# Patient Record
Sex: FEMALE | Race: WHITE | NOT HISPANIC OR LATINO | Employment: UNEMPLOYED | URBAN - METROPOLITAN AREA
[De-identification: names, ages, dates, MRNs, and addresses within clinical notes are randomized per-mention and may not be internally consistent; named-entity substitution may affect disease eponyms.]

---

## 2017-01-02 RX ORDER — ATORVASTATIN CALCIUM 20 MG/1
TABLET, FILM COATED ORAL
Qty: 90 TABLET | Refills: 0 | Status: SHIPPED | OUTPATIENT
Start: 2017-01-02 | End: 2017-01-23 | Stop reason: SDUPTHER

## 2017-01-10 ENCOUNTER — TELEPHONE (OUTPATIENT)
Dept: FAMILY MEDICINE | Facility: CLINIC | Age: 72
End: 2017-01-10

## 2017-01-10 NOTE — TELEPHONE ENCOUNTER
----- Message from Tana Guerra sent at 1/5/2017 12:27 PM CST -----  Contact: Self [phone]  Pt called to get results to her stool sample that she had done , pt states she dropped the sample off on Dec 12 th, 2016 , please contact pt at [phone]

## 2017-01-10 NOTE — TELEPHONE ENCOUNTER
----- Message from Matilde Cleveland sent at 1/10/2017  3:49 PM CST -----  Contact: self  Pt is requesting a call back in regards to her test results from the stool sampler Please call pt at your earliest convenience.  Thanks !

## 2017-01-10 NOTE — TELEPHONE ENCOUNTER
----- Message from Alexia Pereyra sent at 1/6/2017  2:16 PM CST -----  Contact: [phone]  Pt is requesting a call back in regards to her test results from the stool sampler Please call pt at your earliest convenience.  Thanks !

## 2017-01-16 DIAGNOSIS — I10 HYPERTENSION, BENIGN: ICD-10-CM

## 2017-01-16 RX ORDER — ATENOLOL 25 MG/1
TABLET ORAL
Qty: 90 TABLET | Refills: 0 | Status: SHIPPED | OUTPATIENT
Start: 2017-01-16 | End: 2019-01-23

## 2017-01-19 DIAGNOSIS — Z12.11 SCREENING FOR MALIGNANT NEOPLASM OF COLON: Primary | ICD-10-CM

## 2017-01-23 ENCOUNTER — OFFICE VISIT (OUTPATIENT)
Dept: FAMILY MEDICINE | Facility: CLINIC | Age: 72
End: 2017-01-23
Payer: MEDICARE

## 2017-01-23 VITALS
WEIGHT: 187.19 LBS | DIASTOLIC BLOOD PRESSURE: 80 MMHG | SYSTOLIC BLOOD PRESSURE: 120 MMHG | TEMPERATURE: 98 F | HEART RATE: 52 BPM | BODY MASS INDEX: 36.75 KG/M2 | OXYGEN SATURATION: 97 % | HEIGHT: 60 IN

## 2017-01-23 DIAGNOSIS — I25.110 CORONARY ARTERY DISEASE INVOLVING NATIVE CORONARY ARTERY OF NATIVE HEART WITH UNSTABLE ANGINA PECTORIS: ICD-10-CM

## 2017-01-23 DIAGNOSIS — I70.0 AORTIC CALCIFICATION: ICD-10-CM

## 2017-01-23 DIAGNOSIS — F33.0 MAJOR DEPRESSIVE DISORDER, RECURRENT EPISODE, MILD: Primary | ICD-10-CM

## 2017-01-23 DIAGNOSIS — Z23 NEED FOR PNEUMOCOCCAL VACCINATION: ICD-10-CM

## 2017-01-23 DIAGNOSIS — Z23 NEED FOR TDAP VACCINATION: ICD-10-CM

## 2017-01-23 DIAGNOSIS — I73.9 PVD (PERIPHERAL VASCULAR DISEASE): ICD-10-CM

## 2017-01-23 DIAGNOSIS — I10 ESSENTIAL HYPERTENSION: Chronic | ICD-10-CM

## 2017-01-23 DIAGNOSIS — E78.5 HYPERLIPIDEMIA, UNSPECIFIED HYPERLIPIDEMIA TYPE: Chronic | ICD-10-CM

## 2017-01-23 PROCEDURE — 1160F RVW MEDS BY RX/DR IN RCRD: CPT | Mod: S$GLB,,, | Performed by: FAMILY MEDICINE

## 2017-01-23 PROCEDURE — 99499 UNLISTED E&M SERVICE: CPT | Mod: S$PBB,,, | Performed by: FAMILY MEDICINE

## 2017-01-23 PROCEDURE — G0009 ADMIN PNEUMOCOCCAL VACCINE: HCPCS | Mod: 59,S$GLB,, | Performed by: FAMILY MEDICINE

## 2017-01-23 PROCEDURE — 99214 OFFICE O/P EST MOD 30 MIN: CPT | Mod: 25,S$GLB,, | Performed by: FAMILY MEDICINE

## 2017-01-23 PROCEDURE — 99999 PR PBB SHADOW E&M-EST. PATIENT-LVL III: CPT | Mod: PBBFAC,,, | Performed by: FAMILY MEDICINE

## 2017-01-23 PROCEDURE — 90471 IMMUNIZATION ADMIN: CPT | Mod: S$GLB,,, | Performed by: FAMILY MEDICINE

## 2017-01-23 PROCEDURE — 90715 TDAP VACCINE 7 YRS/> IM: CPT | Mod: S$GLB,,, | Performed by: FAMILY MEDICINE

## 2017-01-23 PROCEDURE — 3079F DIAST BP 80-89 MM HG: CPT | Mod: S$GLB,,, | Performed by: FAMILY MEDICINE

## 2017-01-23 PROCEDURE — 3074F SYST BP LT 130 MM HG: CPT | Mod: S$GLB,,, | Performed by: FAMILY MEDICINE

## 2017-01-23 PROCEDURE — 90732 PPSV23 VACC 2 YRS+ SUBQ/IM: CPT | Mod: S$GLB,,, | Performed by: FAMILY MEDICINE

## 2017-01-23 PROCEDURE — 1157F ADVNC CARE PLAN IN RCRD: CPT | Mod: S$GLB,,, | Performed by: FAMILY MEDICINE

## 2017-01-23 PROCEDURE — 1159F MED LIST DOCD IN RCRD: CPT | Mod: S$GLB,,, | Performed by: FAMILY MEDICINE

## 2017-01-23 RX ORDER — CLONAZEPAM 1 MG/1
1 TABLET ORAL 2 TIMES DAILY
Qty: 60 TABLET | Refills: 0 | Status: SHIPPED | OUTPATIENT
Start: 2017-02-01

## 2017-01-23 RX ORDER — CLONAZEPAM 1 MG/1
1 TABLET ORAL 2 TIMES DAILY
Qty: 60 TABLET | Refills: 0 | Status: SHIPPED | OUTPATIENT
Start: 2017-03-01 | End: 2018-08-22

## 2017-01-23 RX ORDER — CLONAZEPAM 1 MG/1
1 TABLET ORAL 2 TIMES DAILY
Qty: 60 TABLET | Refills: 0 | Status: SHIPPED | OUTPATIENT
Start: 2017-04-01 | End: 2018-08-22

## 2017-01-23 NOTE — MR AVS SNAPSHOT
[facility]  [address]  [place] [address]  Phone: [phone]  Fax: [phone]                  Elizabeth Ayala   2017 1:45 PM   Office Visit    Description:  Female : 1945   Provider:  Eladia Fragoso MD   Department:  [facility] Medicine           Reason for Visit     Establish Care     Medication Refill     Other Misc           Diagnoses this Visit        Comments    Need for pneumococcal vaccination    -  Primary     Depression, unspecified depression type         Need for Tdap vaccination                To Do List           Goals (5 Years of Data)     None       These Medications        Disp Refills Start End    clonazePAM (KLONOPIN) 1 MG tablet 60 tablet 0 2017     Take 1 tablet (1 mg total) by mouth 2 (two) times daily. - Oral    Pharmacy: [facility]/pharmacy #[id number] [facility] [place] [facility] [address] Ph #: [phone]       clonazePAM (KLONOPIN) 1 MG tablet 60 tablet 0 3/1/2017     Take 1 tablet (1 mg total) by mouth 2 (two) times daily. - Oral    [id number] [place] [place] [facility] [facility] [address] Ph #: [phone]       clonazePAM (KLONOPIN) 1 MG tablet 60 tablet 0 2017     Take 1 tablet (1 mg total) by mouth 2 (two) times daily. - Oral    [id number] [place] - [address] Ph #: [phone]         [facility]     [facility] -  Assistance  Registered nurses in the [facility] provide clinical advisement, health education, appointment booking, and other advisory services.  Call for this free service at [phone].             Medications           Message regarding Medications     Verify the changes and/or additions to your medication regime listed below are the same as discussed with your clinician today.  If any of these changes or additions are incorrect, please notify your healthcare provider.        START taking these NEW medications        Refills     clonazePAM (KLONOPIN) 1 MG tablet 0    Starting on: 3/1/2017    Sig: Take 1 tablet (1 mg total) by mouth 2 (two) times daily.    Class: Print    Route: Oral    clonazePAM (KLONOPIN) 1 MG tablet 0    Starting on: 4/1/2017    Sig: Take 1 tablet (1 mg total) by mouth 2 (two) times daily.    Class: Print    Route: Oral      STOP taking these medications     b complex vitamins tablet Take 1 tablet by mouth once daily.    meclizine (ANTIVERT) 12.5 mg tablet Take 12.5 mg by mouth once daily.     ropinirole (REQUIP) 0.5 MG tablet TAKE 1 TABLET BY MOUTH 2 HOURS PRIOR TO BEDTIME FOR 7 DAYS THEN MAY INCREASE TO 2 TABLETS 2 HOURS PRIOR TO SLEEP           Verify that the below list of medications is an accurate representation of the medications you are currently taking.  If none reported, the list may be blank. If incorrect, please contact your healthcare provider. Carry this list with you in case of emergency.           Current Medications     aspirin (ECOTRIN) 81 MG EC tablet Take 81 mg by mouth once daily.      atenolol (TENORMIN) 25 MG tablet TAKE 1 TABLET BY MOUTH ONCE DAILY    atorvastatin (LIPITOR) 20 MG tablet TAKE 1 TABLET (20 MG TOTAL) BY MOUTH ONCE DAILY.    clonazePAM (KLONOPIN) 1 MG tablet Starting on Feb 01, 2017. Take 1 tablet (1 mg total) by mouth 2 (two) times daily.    clonazePAM (KLONOPIN) 1 MG tablet Starting on Mar 01, 2017. Take 1 tablet (1 mg total) by mouth 2 (two) times daily.    clonazePAM (KLONOPIN) 1 MG tablet Starting on Apr 01, 2017. Take 1 tablet (1 mg total) by mouth 2 (two) times daily.    escitalopram oxalate (LEXAPRO) 20 MG tablet Take 1 tablet (20 mg total) by mouth once daily.    hydrochlorothiazide (HYDRODIURIL) 25 MG tablet TAKE 1 TABLET BY MOUTH EVERY DAY    isosorbide mononitrate (IMDUR) 30 MG 24 hr tablet Take 30 mg by mouth once daily.    OM-3/DHA/EPA/FISH OIL/VIT D3 (FISH OIL-VIT D3 ORAL) Take by mouth.             Clinical Reference Information           Vital Signs - Last Recorded   Most recent update: 1/23/2017  1:39 PM by Faviola Davidson MA    BP Pulse Temp Ht Wt SpO2    120/80 (BP Location: Right arm, Patient Position: Sitting, BP Method: Manual) (!) 52 98.4 °F (36.9 °C) (Oral) 5' (1.524 m) 84.9 kg (187 lb 2.7 oz) 97%    BMI                36.55 kg/m2          Blood Pressure          Most Recent Value    BP  120/80      Allergies as of 1/23/2017     Effexor [Venlafaxine]    Codeine      Immunizations Administered on Date of Encounter - 1/23/2017     Name Date Dose VIS Date Route    Pneumococcal Polysaccharide - 23 Valent  Incomplete 0.5 mL 4/24/2015 Intramuscular    TDAP  Incomplete 0.5 mL 2/24/2015 Intramuscular      Orders Placed During Today's Visit      Normal Orders This Visit    Pneumococcal Polysaccharide Vaccine (23 Valent) (SQ/IM)     Tdap Vaccine (Adult)       [facility] Sign-Up     Activating your [facility] account is as easy as 1-2-3!     1) Visit [email or web address], select Sign Up Now, enter this activation code and your date of birth, then select Next.  [email or web address]  Expires: 3/9/2017  1:57 PM      2) Create a username and password to use when you visit [facility] in the future and select a security question in case you lose your password and select Next.    3) Enter your e-mail address and click Sign Up!    Additional Information  If you have questions, please e-mail [email or web address] or call [phone] to talk to our [facility] staff. Remember, [facility] is NOT to be used for urgent needs. For medical emergencies, dial 911.

## 2017-01-23 NOTE — PROGRESS NOTES
Routine Office Visit    Patient Name: Elizabeth Ayala    : 1945  MRN: [id number]    Subjective:  Elizabeth is a 71 y.o. female who presents today for     1. Anxiety / depression - chronic condition for patient - pt states she feels her symptoms are worse now. She had recent death (son) in her family. She is on lexapro for depression, and takes clonazepam for her depression and anxiety symptoms. She is doing well with current regimen and denies any side effects.   2. Pt does not want colonoscopy. She prefers stool cards to evaluate for blood in urine.     Review of Systems   Constitutional: Negative for chills and fever.   HENT: Negative for congestion.    Eyes: Negative for blurred vision.   Respiratory: Negative for cough.    Cardiovascular: Negative for chest pain.   Gastrointestinal: Negative for abdominal pain, constipation, diarrhea, heartburn, nausea and vomiting.   Genitourinary: Negative for dysuria.   Musculoskeletal: Negative for myalgias.   Skin: Negative for itching and rash.   Neurological: Negative for dizziness and headaches.   Psychiatric/Behavioral: Negative for depression.       Active Problem List  Patient Active Problem List   Diagnosis    Depression    Essential hypertension, benign    CAD (coronary artery disease)    Obesity    Hypertension    Hyperlipidemia       Past Surgical History  Past Surgical History   Procedure Laterality Date    Hysterectomy      Adenoidectomy      Tonsillectomy      Breast surgery       biospy    Total knee arthroplasty         Family History  History reviewed. No pertinent family history.    Social History  Social History     Social History    Marital status:      Spouse name: N/A    Number of children: N/A    Years of education: N/A     Occupational History    Not on file.     Social History Main Topics    Smoking status: Never Smoker    Smokeless tobacco: Never Used    Alcohol use No    Drug use: No    Sexual activity: Yes     Partners:  Male     Other Topics Concern    Not on file     Social History Narrative       Medications and Allergies  Reviewed and updated.       Physical Exam  Visit Vitals    /80 (BP Location: Right arm, Patient Position: Sitting, BP Method: Manual)    Pulse (!) 52    Temp 98.4 °F (36.9 °C) (Oral)    Ht 5' (1.524 m)    Wt 84.9 kg (187 lb 2.7 oz)    SpO2 97%    BMI 36.55 kg/m2     Physical Exam   Constitutional: She is oriented to person, place, and time. She appears well-developed and well-nourished.   HENT:   Head: Normocephalic and atraumatic.   Eyes: Conjunctivae and EOM are normal. Pupils are equal, round, and reactive to light.   Neck: Normal range of motion. Neck supple.   Cardiovascular: Normal rate, regular rhythm and normal heart sounds.  Exam reveals no gallop and no friction rub.    No murmur heard.  Pulmonary/Chest: Breath sounds normal. No respiratory distress.   Abdominal: Soft. Bowel sounds are normal. She exhibits no distension. There is no tenderness.   Musculoskeletal: Normal range of motion.   Lymphadenopathy:     She has no cervical adenopathy.   Neurological: She is alert and oriented to person, place, and time.   Skin: Skin is warm.   Psychiatric: She has a normal mood and affect.         Assessment/Plan:  Elizabeth Ayala is a 71 y.o. female who presents today for :    Major depressive disorder, recurrent episode, mild  -     clonazePAM (KLONOPIN) 1 MG tablet; Take 1 tablet (1 mg total) by mouth 2 (two) times daily.  Dispense: 60 tablet; Refill: 0  -     clonazePAM (KLONOPIN) 1 MG tablet; Take 1 tablet (1 mg total) by mouth 2 (two) times daily.  Dispense: 60 tablet; Refill: 0  -     clonazePAM (KLONOPIN) 1 MG tablet; Take 1 tablet (1 mg total) by mouth 2 (two) times daily.  Dispense: 60 tablet; Refill: 0  -     Ambulatory referral to Psychiatry  Reviewed chart / [place]   Advise pt would need to return every 6 months for refills (patient initially was returning once a year)  Pt was on lexapro  - did not continue - did not work for her; has tried celexa     Need for pneumococcal vaccination  -     Pneumococcal Polysaccharide Vaccine (23 Valent) (SQ/IM)    Need for Tdap vaccination  -     Tdap Vaccine (Adult)    Coronary artery disease involving native coronary artery of native heart with unstable angina pectoris / PVD (peripheral vascular disease)  The current medical regimen is effective;  continue present plan and medications.    Aortic calcification / Hyperlipidemia, unspecified hyperlipidemia type / Essential hypertension  Comments:  cxr 4/29/2015  Patient with calcification of the aorta.  Stable/asymptomatic. Currently stable on lipid lowering treatment and b/p monitoring.  Continue with current medication regimen     Stool cards ordered      Return in about 6 months (around 7/23/2017), or if symptoms worsen or fail to improve.

## 2017-01-30 ENCOUNTER — LAB VISIT (OUTPATIENT)
Dept: LAB | Facility: HOSPITAL | Age: 72
End: 2017-01-30
Attending: FAMILY MEDICINE
Payer: MEDICARE

## 2017-01-30 DIAGNOSIS — Z12.11 SCREENING FOR MALIGNANT NEOPLASM OF COLON: ICD-10-CM

## 2017-01-30 LAB
OB PNL STL: NEGATIVE

## 2017-01-30 PROCEDURE — 82272 OCCULT BLD FECES 1-3 TESTS: CPT | Mod: 91

## 2017-01-31 ENCOUNTER — TELEPHONE (OUTPATIENT)
Dept: FAMILY MEDICINE | Facility: CLINIC | Age: 72
End: 2017-01-31

## 2017-03-06 RX ORDER — ATORVASTATIN CALCIUM 20 MG/1
TABLET, FILM COATED ORAL
Qty: 90 TABLET | Refills: 0 | Status: SHIPPED | OUTPATIENT
Start: 2017-03-06 | End: 2018-08-22

## 2017-04-19 RX ORDER — HYDROCHLOROTHIAZIDE 25 MG/1
25 TABLET ORAL DAILY
Qty: 90 TABLET | Refills: 0 | Status: ON HOLD | OUTPATIENT
Start: 2017-04-19 | End: 2024-03-28 | Stop reason: HOSPADM

## 2017-04-19 NOTE — TELEPHONE ENCOUNTER
----- Message from Minerva Sparks LPN sent at 4/19/2017 10:34 AM CDT -----  Contact: [facility]      ----- Message -----     From: Kourtney Bernstein     Sent: 4/19/2017  10:05 AM       To: Guille GUZMAN Staff    REFILL: hydrochlorothiazide (HYDRODIURIL) 25 MG tablet    PLEASE SEND TO [facility]

## 2017-06-07 ENCOUNTER — TELEPHONE (OUTPATIENT)
Dept: FAMILY MEDICINE | Facility: CLINIC | Age: 72
End: 2017-06-07

## 2017-06-07 DIAGNOSIS — F41.9 ANXIETY: ICD-10-CM

## 2017-06-07 RX ORDER — MOMETASONE FUROATE 1 MG/G
CREAM TOPICAL
Refills: 1 | COMMUNITY
Start: 2017-04-03 | End: 2018-08-22

## 2017-06-07 NOTE — TELEPHONE ENCOUNTER
Patient states that refills of this medication must go through Dr. Amna Levy's office @ the [facility], [phone].  Pharmacist @ [facility] notified and states they will contact her.

## 2017-06-07 NOTE — TELEPHONE ENCOUNTER
----- Message from Balieeerin Avitia sent at 6/7/2017 12:24 PM CDT -----  Contact: [facility]   Refill request on escitalopram oxalate (LEXAPRO) 20 MG tablet. Thanks!

## 2018-08-17 DIAGNOSIS — Z12.39 BREAST CANCER SCREENING: ICD-10-CM

## 2018-08-22 PROBLEM — N64.52 NIPPLE DISCHARGE: Status: ACTIVE | Noted: 2018-08-22

## 2018-08-22 NOTE — H&P (VIEW-ONLY)
Subjective:       Patient ID: Elizabeth Ayala is a 72 y.o. female.    Chief Complaint: Breast Discharge    HPI 71 yo female with bloody nipple discharge and recent biopsy that was negative  Review of Systems   Constitutional: Negative.    HENT: Negative.    Eyes: Negative.    Respiratory: Negative.    Cardiovascular: Negative.    Gastrointestinal: Negative.    Endocrine: Negative.    Musculoskeletal: Negative.    Skin: Negative.    Allergic/Immunologic: Negative.    Neurological: Negative.    Hematological: Negative.    Psychiatric/Behavioral: Negative.    All other systems reviewed and are negative.      Objective:      Physical Exam   Constitutional: She is oriented to person, place, and time. She appears well-developed and well-nourished.   HENT:   Head: Normocephalic and atraumatic.   Right Ear: External ear normal.   Left Ear: External ear normal.   Nose: Nose normal.   Mouth/Throat: Oropharynx is clear and moist.   Eyes: Conjunctivae and EOM are normal. Pupils are equal, round, and reactive to light.   Neck: Normal range of motion. Neck supple.   Cardiovascular: Normal rate, regular rhythm, normal heart sounds and intact distal pulses.   Pulmonary/Chest: Effort normal and breath sounds normal.   Abdominal: Soft. Bowel sounds are normal.   Musculoskeletal: Normal range of motion.   Neurological: She is alert and oriented to person, place, and time. She has normal reflexes.   Skin: Skin is warm and dry.   Psychiatric: She has a normal mood and affect. Her behavior is normal. Thought content normal.   Vitals reviewed.      Assessment:     bloody nipple discharge      Plan:       To the OR for major duct excision with the risks and possible complications and she agrees to proceed

## 2018-09-07 ENCOUNTER — HOSPITAL ENCOUNTER (OUTPATIENT)
Dept: PREADMISSION TESTING | Facility: HOSPITAL | Age: 73
Discharge: HOME | End: 2018-09-07
Attending: SURGERY
Payer: MEDICARE

## 2018-09-07 VITALS
BODY MASS INDEX: 35.23 KG/M2 | TEMPERATURE: 99 F | WEIGHT: 179.44 LBS | DIASTOLIC BLOOD PRESSURE: 62 MMHG | OXYGEN SATURATION: 96 % | SYSTOLIC BLOOD PRESSURE: 128 MMHG | RESPIRATION RATE: 17 BRPM | HEIGHT: 60 IN | HEART RATE: 58 BPM

## 2018-09-07 DIAGNOSIS — N64.52 NIPPLE DISCHARGE: ICD-10-CM

## 2018-09-07 DIAGNOSIS — Z01.818 PRE-OP TESTING: Primary | ICD-10-CM

## 2018-09-07 LAB
ALBUMIN SERPL BCP-MCNC: 3.9 G/DL
ALP SERPL-CCNC: 69 U/L
ALT SERPL W/O P-5'-P-CCNC: 28 U/L
ANION GAP SERPL CALC-SCNC: 9 MMOL/L
AST SERPL-CCNC: 23 U/L
BASOPHILS # BLD AUTO: 0.02 K/UL
BASOPHILS NFR BLD: 0.3 %
BILIRUB SERPL-MCNC: 0.5 MG/DL
BUN SERPL-MCNC: 16 MG/DL
CALCIUM SERPL-MCNC: 10 MG/DL
CHLORIDE SERPL-SCNC: 102 MMOL/L
CO2 SERPL-SCNC: 26 MMOL/L
CREAT SERPL-MCNC: 0.7 MG/DL
DIFFERENTIAL METHOD: NORMAL
EOSINOPHIL # BLD AUTO: 0.3 K/UL
EOSINOPHIL NFR BLD: 4.8 %
ERYTHROCYTE [DISTWIDTH] IN BLOOD BY AUTOMATED COUNT: 13.5 %
EST. GFR  (AFRICAN AMERICAN): >60 ML/MIN/1.73 M^2
EST. GFR  (NON AFRICAN AMERICAN): >60 ML/MIN/1.73 M^2
GLUCOSE SERPL-MCNC: 92 MG/DL
HCT VFR BLD AUTO: 38.1 %
HGB BLD-MCNC: 13 G/DL
LYMPHOCYTES # BLD AUTO: 2 K/UL
LYMPHOCYTES NFR BLD: 28.6 %
MCH RBC QN AUTO: 28.6 PG
MCHC RBC AUTO-ENTMCNC: 34.1 G/DL
MCV RBC AUTO: 84 FL
MONOCYTES # BLD AUTO: 0.7 K/UL
MONOCYTES NFR BLD: 9.7 %
NEUTROPHILS # BLD AUTO: 4.1 K/UL
NEUTROPHILS NFR BLD: 56.6 %
PLATELET # BLD AUTO: 312 K/UL
PMV BLD AUTO: 9.2 FL
POTASSIUM SERPL-SCNC: 3.7 MMOL/L
PROT SERPL-MCNC: 7.4 G/DL
RBC # BLD AUTO: 4.54 M/UL
SODIUM SERPL-SCNC: 137 MMOL/L
WBC # BLD AUTO: 7.14 K/UL

## 2018-09-07 PROCEDURE — 93010 ELECTROCARDIOGRAM REPORT: CPT | Mod: ,,, | Performed by: INTERNAL MEDICINE

## 2018-09-07 PROCEDURE — 85025 COMPLETE CBC W/AUTO DIFF WBC: CPT

## 2018-09-07 PROCEDURE — 93005 ELECTROCARDIOGRAM TRACING: CPT

## 2018-09-07 PROCEDURE — 80053 COMPREHEN METABOLIC PANEL: CPT

## 2018-09-07 RX ORDER — MULTIVITAMIN
1 TABLET ORAL DAILY
COMMUNITY

## 2018-09-07 RX ORDER — NICOTINE POLACRILEX 2 MG
1 GUM BUCCAL DAILY
COMMUNITY

## 2018-09-07 NOTE — DISCHARGE INSTRUCTIONS
Your surgery is scheduled for _Friday Sept 14, 2018___________________.    Call [phone] between 2 p.m. and 5 p.m. on   Thursday___ to find out your arrival time for the day of your surgery.      Please report to SAME DAY SURGERY UNIT on the 2nd FLOOR at _______ a.m.  Use front door entrance. The doors open at 0530 am.          INSTRUCTIONS IMPORTANT!!!  ¨ Do not eat or drink after 12 midnight-including water. OK to brush teeth, no   gum, candy or mints!    ¨ Take only these medicines with a small swallow of water-morning of surgery.  Take Amlodipine and Atenolol with swallow of water morning of surgery        __x__  Prep instructions: ENEMA   SHOWER   OTHER  ______________________  __x__  Please shower using Hibiclens soap the night before AND  the morning of your surgery/procedure. Do not use Hibiclens on your face or genitals               If your surgery is around your belly button (Navel) be sure to wash inside your  belly button also. Rinse hibiclens off completely.  __x__  No shaving of procedural area at least 4-5 days before surgery due to  increased risk of skin irritation and/or possible infection.  __x__  Do not wear makeup, including mascara. WEARING EYE MAKEUP MAY  LEAD TO SERIOUS EYE INJURY during surgery.  _x___  No powder, lotions or creams to your body.  _x___  You may wear only deodorant on the day of surgery.  __x__  Please remove all jewelry, including piercings and leave at home.  __x__  No money or valuables needed. Please leave at home.  You may bring your           cell phone.  ____  Please bring any documents given by your doctor.  __x__  If going home the same day, arrange for a ride home. You will not be able to drive if Anesthesia was used.  ____  Children under 18 years require a parent / guardian present the entire time   they are in surgery / recovery.  __x__  Wear loose fitting clothing. Allow for dressings, bandages.  __x__  Stop Aspirin, Ibuprofen, Motrin and Aleve at least 7  days before  surgery, unless otherwise instructed by your doctor, or the nurse.              You MAY use Tylenol/acetaminophen until day of surgery.    _x___  Call MD for temperature above 101 degrees.        _x___ Stop taking any Fish Oil supplement or any Vitamins that contain Vitamin  E at least 7 days prior to surgery.            I have read or had read and explained to me, and understand the above information.  Additional comments or instructions:Please call   [phone] if you have any questions regarding the instructions above.

## 2018-09-14 ENCOUNTER — ANESTHESIA (OUTPATIENT)
Dept: SURGERY | Facility: HOSPITAL | Age: 73
End: 2018-09-14
Payer: MEDICARE

## 2018-09-14 ENCOUNTER — HOSPITAL ENCOUNTER (OUTPATIENT)
Facility: HOSPITAL | Age: 73
Discharge: HOME | End: 2018-09-14
Attending: SURGERY | Admitting: SURGERY
Payer: MEDICARE

## 2018-09-14 ENCOUNTER — ANESTHESIA EVENT (OUTPATIENT)
Dept: SURGERY | Facility: HOSPITAL | Age: 73
End: 2018-09-14
Payer: MEDICARE

## 2018-09-14 VITALS
SYSTOLIC BLOOD PRESSURE: 149 MMHG | WEIGHT: 179.44 LBS | TEMPERATURE: 97 F | RESPIRATION RATE: 16 BRPM | BODY MASS INDEX: 35.23 KG/M2 | HEIGHT: 60 IN | DIASTOLIC BLOOD PRESSURE: 63 MMHG | OXYGEN SATURATION: 97 % | HEART RATE: 48 BPM

## 2018-09-14 DIAGNOSIS — N64.52 NIPPLE DISCHARGE: Primary | ICD-10-CM

## 2018-09-14 LAB — POCT GLUCOSE: 112 MG/DL (ref 70–110)

## 2018-09-14 PROCEDURE — 63600175 PHARM REV CODE 636 W HCPCS: Performed by: SURGERY

## 2018-09-14 PROCEDURE — 82962 GLUCOSE BLOOD TEST: CPT | Performed by: SURGERY

## 2018-09-14 PROCEDURE — 27201423 OPTIME MED/SURG SUP & DEVICES STERILE SUPPLY: Performed by: SURGERY

## 2018-09-14 PROCEDURE — 71000015 HC POSTOP RECOV 1ST HR: Performed by: SURGERY

## 2018-09-14 PROCEDURE — 63600175 PHARM REV CODE 636 W HCPCS: Performed by: ANESTHESIOLOGY

## 2018-09-14 PROCEDURE — D9220A PRA ANESTHESIA: Mod: CRNA,,, | Performed by: NURSE ANESTHETIST, CERTIFIED REGISTERED

## 2018-09-14 PROCEDURE — 25000003 PHARM REV CODE 250: Performed by: ANESTHESIOLOGY

## 2018-09-14 PROCEDURE — 27200651 HC AIRWAY, LMA: Performed by: NURSE ANESTHETIST, CERTIFIED REGISTERED

## 2018-09-14 PROCEDURE — 37000008 HC ANESTHESIA 1ST 15 MINUTES: Performed by: SURGERY

## 2018-09-14 PROCEDURE — 71000016 HC POSTOP RECOV ADDL HR: Performed by: SURGERY

## 2018-09-14 PROCEDURE — 37000009 HC ANESTHESIA EA ADD 15 MINS: Performed by: SURGERY

## 2018-09-14 PROCEDURE — 71000039 HC RECOVERY, EACH ADD'L HOUR: Performed by: SURGERY

## 2018-09-14 PROCEDURE — 88307 TISSUE EXAM BY PATHOLOGIST: CPT | Performed by: PATHOLOGY

## 2018-09-14 PROCEDURE — 88307 TISSUE EXAM BY PATHOLOGIST: CPT | Mod: 26,,, | Performed by: PATHOLOGY

## 2018-09-14 PROCEDURE — C9290 INJ, BUPIVACAINE LIPOSOME: HCPCS | Performed by: SURGERY

## 2018-09-14 PROCEDURE — 25000003 PHARM REV CODE 250: Performed by: SURGERY

## 2018-09-14 PROCEDURE — 36000707: Performed by: SURGERY

## 2018-09-14 PROCEDURE — 25000003 PHARM REV CODE 250: Performed by: NURSE ANESTHETIST, CERTIFIED REGISTERED

## 2018-09-14 PROCEDURE — D9220A PRA ANESTHESIA: Mod: ANES,,, | Performed by: ANESTHESIOLOGY

## 2018-09-14 PROCEDURE — 63600175 PHARM REV CODE 636 W HCPCS: Performed by: NURSE ANESTHETIST, CERTIFIED REGISTERED

## 2018-09-14 PROCEDURE — 36000706: Performed by: SURGERY

## 2018-09-14 PROCEDURE — S0020 INJECTION, BUPIVICAINE HYDRO: HCPCS | Performed by: SURGERY

## 2018-09-14 PROCEDURE — 71000033 HC RECOVERY, INTIAL HOUR: Performed by: SURGERY

## 2018-09-14 RX ORDER — METOCLOPRAMIDE HYDROCHLORIDE 5 MG/ML
INJECTION INTRAMUSCULAR; INTRAVENOUS
Status: DISCONTINUED | OUTPATIENT
Start: 2018-09-14 | End: 2018-09-14

## 2018-09-14 RX ORDER — CEFAZOLIN SODIUM 2 G/50ML
2 SOLUTION INTRAVENOUS
Status: COMPLETED | OUTPATIENT
Start: 2018-09-14 | End: 2018-09-14

## 2018-09-14 RX ORDER — LIDOCAINE HCL/PF 100 MG/5ML
SYRINGE (ML) INTRAVENOUS
Status: DISCONTINUED | OUTPATIENT
Start: 2018-09-14 | End: 2018-09-14

## 2018-09-14 RX ORDER — SODIUM CHLORIDE 0.9 % (FLUSH) 0.9 %
3 SYRINGE (ML) INJECTION
Status: DISCONTINUED | OUTPATIENT
Start: 2018-09-14 | End: 2018-09-17 | Stop reason: HOSPADM

## 2018-09-14 RX ORDER — SODIUM CHLORIDE, SODIUM LACTATE, POTASSIUM CHLORIDE, CALCIUM CHLORIDE 600; 310; 30; 20 MG/100ML; MG/100ML; MG/100ML; MG/100ML
INJECTION, SOLUTION INTRAVENOUS CONTINUOUS
Status: DISCONTINUED | OUTPATIENT
Start: 2018-09-14 | End: 2018-09-17 | Stop reason: HOSPADM

## 2018-09-14 RX ORDER — MIDAZOLAM HYDROCHLORIDE 1 MG/ML
INJECTION, SOLUTION INTRAMUSCULAR; INTRAVENOUS
Status: DISCONTINUED | OUTPATIENT
Start: 2018-09-14 | End: 2018-09-14

## 2018-09-14 RX ORDER — SODIUM CHLORIDE 9 MG/ML
INJECTION, SOLUTION INTRAVENOUS CONTINUOUS
Status: DISCONTINUED | OUTPATIENT
Start: 2018-09-14 | End: 2018-09-17 | Stop reason: HOSPADM

## 2018-09-14 RX ORDER — PROPOFOL 10 MG/ML
VIAL (ML) INTRAVENOUS
Status: DISCONTINUED | OUTPATIENT
Start: 2018-09-14 | End: 2018-09-14

## 2018-09-14 RX ORDER — PROPOFOL 10 MG/ML
VIAL (ML) INTRAVENOUS CONTINUOUS PRN
Status: DISCONTINUED | OUTPATIENT
Start: 2018-09-14 | End: 2018-09-14

## 2018-09-14 RX ORDER — FENTANYL CITRATE 50 UG/ML
INJECTION, SOLUTION INTRAMUSCULAR; INTRAVENOUS
Status: DISCONTINUED | OUTPATIENT
Start: 2018-09-14 | End: 2018-09-14

## 2018-09-14 RX ORDER — GLYCOPYRROLATE 0.2 MG/ML
INJECTION INTRAMUSCULAR; INTRAVENOUS
Status: DISCONTINUED | OUTPATIENT
Start: 2018-09-14 | End: 2018-09-14

## 2018-09-14 RX ORDER — FENTANYL CITRATE 50 UG/ML
25 INJECTION, SOLUTION INTRAMUSCULAR; INTRAVENOUS EVERY 5 MIN PRN
Status: DISCONTINUED | OUTPATIENT
Start: 2018-09-14 | End: 2018-09-17 | Stop reason: HOSPADM

## 2018-09-14 RX ORDER — SODIUM CHLORIDE 9 MG/ML
INJECTION, SOLUTION INTRAVENOUS CONTINUOUS
Status: CANCELLED | OUTPATIENT
Start: 2018-09-14

## 2018-09-14 RX ORDER — BUPIVACAINE HYDROCHLORIDE 2.5 MG/ML
INJECTION, SOLUTION INFILTRATION; PERINEURAL
Status: DISCONTINUED | OUTPATIENT
Start: 2018-09-14 | End: 2018-09-14 | Stop reason: HOSPADM

## 2018-09-14 RX ORDER — OXYCODONE AND ACETAMINOPHEN 5; 325 MG/1; MG/1
1 TABLET ORAL EVERY 4 HOURS PRN
Qty: 30 TABLET | Refills: 0 | Status: SHIPPED | OUTPATIENT
Start: 2018-09-14 | End: 2018-09-26

## 2018-09-14 RX ORDER — MORPHINE SULFATE 10 MG/ML
3 INJECTION INTRAMUSCULAR; INTRAVENOUS; SUBCUTANEOUS
Status: CANCELLED | OUTPATIENT
Start: 2018-09-14

## 2018-09-14 RX ORDER — ONDANSETRON 2 MG/ML
INJECTION INTRAMUSCULAR; INTRAVENOUS
Status: DISCONTINUED | OUTPATIENT
Start: 2018-09-14 | End: 2018-09-14

## 2018-09-14 RX ORDER — ACETAMINOPHEN 10 MG/ML
1000 INJECTION, SOLUTION INTRAVENOUS ONCE
Status: COMPLETED | OUTPATIENT
Start: 2018-09-14 | End: 2018-09-14

## 2018-09-14 RX ADMIN — SODIUM CHLORIDE, SODIUM LACTATE, POTASSIUM CHLORIDE, AND CALCIUM CHLORIDE: .6; .31; .03; .02 INJECTION, SOLUTION INTRAVENOUS at 07:09

## 2018-09-14 RX ADMIN — PROPOFOL 140 MCG/KG/MIN: 10 INJECTION, EMULSION INTRAVENOUS at 09:09

## 2018-09-14 RX ADMIN — FENTANYL CITRATE 50 MCG: 50 INJECTION INTRAMUSCULAR; INTRAVENOUS at 09:09

## 2018-09-14 RX ADMIN — METOCLOPRAMIDE 10 MG: 5 INJECTION, SOLUTION INTRAMUSCULAR; INTRAVENOUS at 08:09

## 2018-09-14 RX ADMIN — FENTANYL CITRATE 25 MCG: 50 INJECTION INTRAMUSCULAR; INTRAVENOUS at 09:09

## 2018-09-14 RX ADMIN — ACETAMINOPHEN 1000 MG: 10 INJECTION, SOLUTION INTRAVENOUS at 10:09

## 2018-09-14 RX ADMIN — MIDAZOLAM HYDROCHLORIDE 2 MG: 1 INJECTION, SOLUTION INTRAMUSCULAR; INTRAVENOUS at 08:09

## 2018-09-14 RX ADMIN — CEFAZOLIN SODIUM 2 G: 2 SOLUTION INTRAVENOUS at 08:09

## 2018-09-14 RX ADMIN — FENTANYL CITRATE 25 MCG: 50 INJECTION INTRAMUSCULAR; INTRAVENOUS at 10:09

## 2018-09-14 RX ADMIN — PROPOFOL 150 MG: 10 INJECTION, EMULSION INTRAVENOUS at 08:09

## 2018-09-14 RX ADMIN — ONDANSETRON 4 MG: 2 INJECTION, SOLUTION INTRAMUSCULAR; INTRAVENOUS at 09:09

## 2018-09-14 RX ADMIN — PROPOFOL 50 MG: 10 INJECTION, EMULSION INTRAVENOUS at 09:09

## 2018-09-14 RX ADMIN — FENTANYL CITRATE 25 MCG: 50 INJECTION INTRAMUSCULAR; INTRAVENOUS at 08:09

## 2018-09-14 RX ADMIN — LIDOCAINE HYDROCHLORIDE 80 MG: 20 INJECTION, SOLUTION INTRAVENOUS at 08:09

## 2018-09-14 RX ADMIN — GLYCOPYRROLATE 0.2 MG: 0.2 INJECTION, SOLUTION INTRAMUSCULAR; INTRAVENOUS at 08:09

## 2018-09-14 NOTE — OP NOTE
DATE OF PROCEDURE:  09/14/2018.    PREOPERATIVE DIAGNOSIS:  Bloody nipple discharge, right breast.    POSTOPERATIVE DIAGNOSIS:  Bloody nipple discharge, right breast.    PROCEDURE PERFORMED:  Major duct excision, right breast.    SURGEON:  Dylan Knowles M.D.    ANESTHESIA:  General.    DESCRIPTION OF OPERATION:  The patient was taken to the Operating Room, placed   on the operating table in the supine position.  Under adequate general   anesthesia, prepped and draped around her right breast and chest in usual   sterile fashion.  The duct of concern was then cannulated with a lacrimal duct   probe.  Incision was made inferiorly circumareolar and this was used to find the duct,   which was identified and ligated.  Distal dissection was done.  I could feel   some grittiness, not sure whether this was secondary to the inflammatory process   or neoplastic process; however, I was able to get around this entire process,   removed it and sent to Pathology with margin control.  Hemostasis was obtained   with the PlasmaBlade.  Following this, the patient's incision was then closed in   layers with absorbable suture.  Steri-Strips were applied as well as a bandage.    The patient was awakened and transported to Recovery Room in satisfactory   condition.      BOYD  dd: 09/14/2018 09:43:12 (CDT)  td: 09/14/2018 13:10:56 (CDT)  Doc ID   #[id number]  Job ID #[id number]    CC:

## 2018-09-14 NOTE — ANESTHESIA PREPROCEDURE EVALUATION
09/14/2018  Elizabeth Ayala is a 72 y.o., female.    Pre-op Assessment    I have reviewed the Patient Summary Reports.     I have reviewed the Medications.     Review of Systems  Anesthesia Hx:  No previous Anesthesia    Social:  Non-Smoker    Hematology/Oncology:  Hematology Normal   Oncology Normal     EENT/Dental:EENT/Dental Normal   Cardiovascular:   Hypertension CAD      Pulmonary:  Pulmonary Normal    Renal/:  Renal/ Normal     Hepatic/GI:  Hepatic/GI Normal    Musculoskeletal:   Arthritis     Neurological:  Neurology Normal    Endocrine:  Endocrine Normal    Dermatological:  Skin Normal    Psych:   Psychiatric History          Physical Exam  General:  Well nourished    Airway/Jaw/Neck:  Airway Findings: Mallampati: II TM Distance: 4 - 6 cm      Dental:  DENTAL FINDINGS: Normal   Chest/Lungs:  Chest/Lungs Clear    Heart/Vascular:  Heart Findings: Normal       Mental Status:  Mental Status Findings:  Cooperative, Alert and Oriented         Anesthesia Plan  Type of Anesthesia, risks & benefits discussed:  Anesthesia Type:  general  Patient's Preference:   Intra-op Monitoring Plan: standard ASA monitors  Intra-op Monitoring Plan Comments:   Post Op Pain Control Plan: multimodal analgesia, IV/PO Opioids PRN and per primary service following discharge from PACU  Post Op Pain Control Plan Comments:   Induction:    Beta Blocker:  Patient is on a Beta-Blocker and has received one dose within the past 24 hours (No further documentation required).       Informed Consent: Patient understands risks and agrees with Anesthesia plan.  Questions answered. Anesthesia consent signed with patient.  ASA Score: 3     Day of Surgery Review of History & Physical:    H&P update referred to the provider.  H&P completed by Anesthesiologist.   Anesthesia Plan Notes: npo

## 2018-09-14 NOTE — DISCHARGE INSTRUCTIONS
[facility]   Office # [phone]     Discharge Instructions for Same Day Surgery     Call the office for and appointment if one has not already been made.     Diet: Drink plenty of fluids the first 48 hours and you may resume your   usual diet.     Activity: No heavy lifting (over 10 pounds), pushing or pulling until your   post op visit. Your doctor's office may have told you to limit your lifting to less weight, or even no weight.  Be sure to follow those instructions.    Note: You may ride in a car and you may drive when comfortable.     Do not drive, drink alcohol, or sign legal documents for 24 hours, or if taking narcotic pain medication.    Dressings: Remove the dressing 24 hours after surgery. You may shower  24 hours after surgery and you may wash your hair.     If you have steri strips ( appears to be strips of white tape) on   your incision, leave them on.        Medical: Call the doctor for any of the following problems: fever above 101,   severe pain, bleeding, or abdominal distention (swelling).   If constipated you may take any stool softener you choose.     Occasionally small areas of skin numbness or an unpleasant skin sensation can result. Also, you may find that your incision is swollen and tender for a few days.  Some redness around sutures and staples is a normal reaction, but if the discomfort persists or worsens, call you doctor.                                                Exparel information      To help control your pain after surgery, your surgeon injected Exparel (bupicacaine liposome injectable suspension) into your surgical incision just before the end of the procedure.      Exparel is a local analgesic that contains the local anesthetic bupivacaine..  Local anesthetics provide pain relief by numbing the tissue around the surgical site.    Exparel is specifically designed to release pain medication over time an can control pain for up to 72 hours.    In addition to  Exparel, your surgeon may provide other pain medications to control your pain.    Each patient is different and responds differently to pain medication.  Depending on how you respond to Exparel, you may require less additional pain medication during your recovery.    Side effects can occur with any medication and it is important not to ignore anything you may be experiencing.  Some patients who received Exparel experienced nausea, vomiting, or constipation.  Rarely, patients who receive bupivacaine (the active ingredient in Exparel) have experienced numbness and tingling in their mouth or lips, lightheadedness, or anxiety.  Speak with your doctor right away if you think you may be experiencing any of these sensations, or if you have other questions regarding possible side effects.    Products that contain bupivacaine, like Exparel, may cause a temporary loss of sensation or the ability to move in the area where bupivacaine was injected.    Other formulations of bupivacaine should not be administered within 96 hours following administrations of Exparel.  Do not remove the teal colored bracelet that you have on for 96 hours.  This bracelet will let other health care workers know that you have received Exparel, and not to give you bupivacaine during this 96 hours.    Fall Prevention  Millions of people fall every year and injure themselves. You may have had anesthesia or sedation which may increase your risk of falling. You may have health issues that put you at an increased risk of falling.     Here are ways to reduce your risk of falling.  ·   · Make your home safe by keeping walkways clear of objects you may trip over.  · Use non-slip pads under rugs. Do not use area rugs or small throw rugs.  · Use non-slip mats in bathtubs and showers.  · Install handrails and lights on staircases.  · Do not walk in poorly lit areas.  · Do not stand on chairs or wobbly ladders.  · Use caution when reaching overhead or looking  upward. This position can cause a loss of balance.  · Be sure your shoes fit properly, have non-slip bottoms and are in good condition.   · Wear shoes both inside and out. Avoid going barefoot or wearing slippers.  · Be cautious when going up and down stairs, curbs, and when walking on uneven sidewalks.  · If your balance is poor, consider using a cane or walker.  · If your fall was related to alcohol use, stop or limit alcohol intake.   · If your fall was related to use of sleeping medicines, talk to your doctor about this. You may need to reduce your dosage at bedtime if you awaken during the night to go to the bathroom.    · To reduce the need for nighttime bathroom trips:  ¨ Avoid drinking fluids for several hours before going to bed  ¨ Empty your bladder before going to bed  ¨ Men can keep a urinal at the bedside  · Stay as active as you can. Balance, flexibility, strength, and endurance all come from exercise. They all play a role in preventing falls. Ask your healthcare provider which types of activity are right for you.  · Get your vision checked on a regular basis.  · If you have pets, know where they are before you stand up or walk so you don't trip over them.  · Use night lights.

## 2018-09-14 NOTE — ANESTHESIA POSTPROCEDURE EVALUATION
Anesthesia Post Evaluation    Patient: Elizabeth Ayala    Procedure(s) Performed: Procedure(s) (LRB):  RIGHT BREAST MAJOR DUCT EXCISION (Right)    Final Anesthesia Type: general  Patient location during evaluation: PACU  Patient participation: Yes- Able to Participate  Level of consciousness: awake and alert, oriented and awake  Post-procedure vital signs: reviewed and stable  Pain management: adequate  Airway patency: patent  PONV status at discharge: No PONV  Anesthetic complications: no      Cardiovascular status: blood pressure returned to baseline, hemodynamically stable and stable  Respiratory status: unassisted and spontaneous ventilation  Hydration status: euvolemic  Follow-up not needed.        Visit Vitals  BP (!) 149/63 (BP Location: Right arm, Patient Position: Lying)   Pulse (!) 48   Temp 36.3 °C (97.4 °F) (Oral)   Resp 16   Ht 5' (1.524 m)   Wt 81.4 kg (179 lb 7 oz)   SpO2 97%   Breastfeeding? No   BMI 35.04 kg/m²       Pain/Ortiz Score: Pain Assessment Performed: Yes (9/14/2018  9:45 AM)  Presence of Pain: denies (9/14/2018 10:48 AM)  Pain Rating Prior to Med Admin: 5 (9/14/2018 10:30 AM)  Pain Rating Post Med Admin: 0 (9/14/2018 10:48 AM)  Ortiz Score: 10 (9/14/2018 10:48 AM)

## 2018-09-14 NOTE — INTERVAL H&P NOTE
The patient has been examined and the H&P has been reviewed:    I concur with the findings and no changes have occurred since H&P was written.    Anesthesia/Surgery risks, benefits and alternative options discussed and understood by patient/family.          Active Hospital Problems    Diagnosis  POA    Nipple discharge [N64.52]  Yes      Resolved Hospital Problems   No resolved problems to display.

## 2018-09-14 NOTE — TRANSFER OF CARE
Anesthesia Transfer of Care Note    Patient: Elizabeth Ayala    Procedure(s) Performed: Procedure(s) (LRB):  RIGHT BREAST MAJOR DUCT EXCISION (Right)    Patient location: PACU    Anesthesia Type: general    Transport from OR: Transported from OR on room air with adequate spontaneous ventilation    Post pain: adequate analgesia    Post assessment: no apparent anesthetic complications and tolerated procedure well    Post vital signs: stable    Level of consciousness: sedated and responds to stimulation    Nausea/Vomiting: no nausea/vomiting    Complications: none    Transfer of care protocol was followed      Last vitals:   Visit Vitals  BP (!) 159/67 (BP Location: Right arm)   Pulse 65   Temp 36.4 °C (97.5 °F) (Oral)   Resp 17   Ht 5' (1.524 m)   Wt 81.4 kg (179 lb 7 oz)   SpO2 98%   Breastfeeding? No   BMI 35.04 kg/m²

## 2018-09-14 NOTE — BRIEF OP NOTE
[facility]  Brief Operative Note     SUMMARY     Surgery Date: 9/14/2018     Surgeon(s) and Role:     * Dylan Knowles MD - Primary    Assisting Surgeon: None    Pre-op Diagnosis:  Nipple discharge [N64.52]    Post-op Diagnosis:  Post-Op Diagnosis Codes:     * Nipple discharge [N64.52]    Procedure(s) (LRB):  RIGHT BREAST MAJOR DUCT EXCISION (Right)    Anesthesia: General    Description of the findings of the procedure: fatty mass    Findings/Key Components: same    Estimated Blood Loss: minimal         Specimens:   Specimen (12h ago, onward)    Start     Ordered    09/14/18 0930  Specimen to Pathology - Surgery  Once     Comments:  Major duct excision-Right Breast; short stitch=superior, long stitch= lateral     Start Status   09/14/18 0930 Collected (09/14/18 0930)       09/14/18 0930          Discharge Note    SUMMARY     Admit Date: 9/14/2018    Discharge Date and Time:  09/14/2018 9:40 AM    Hospital Course (synopsis of major diagnoses, care, treatment, and services provided during the course of the hospital stay): uneventful post op course     Final Diagnosis: Post-Op Diagnosis Codes:     * Nipple discharge [N64.52]    Disposition: Home or Self Care    Follow Up/Patient Instructions:     Medications:  Reconciled Home Medications:      Medication List      START taking these medications    oxyCODONE-acetaminophen 5-325 mg per tablet  Commonly known as:  PERCOCET  Take 1 tablet by mouth every 4 (four) hours as needed for Pain.        CONTINUE taking these medications    amLODIPine 2.5 MG tablet  Commonly known as:  NORVASC     aspirin 81 MG EC tablet  Commonly known as:  ECOTRIN  Take 81 mg by mouth once daily.     atenolol 25 MG tablet  Commonly known as:  TENORMIN  TAKE 1 TABLET BY MOUTH ONCE DAILY     atorvastatin 80 MG tablet  Commonly known as:  LIPITOR     biotin 1 mg Cap  Take 1 capsule by mouth once daily.     clonazePAM 1 MG tablet  Commonly known as:  KLONOPIN  Take 1 tablet (1 mg  total) by mouth 2 (two) times daily.     FISH OIL-VIT D3 ORAL  Take by mouth.     hydroCHLOROthiazide 25 MG tablet  Commonly known as:  HYDRODIURIL  Take 1 tablet (25 mg total) by mouth once daily.     ibuprofen 100 MG tablet  Commonly known as:  ADVIL,MOTRIN  Take 600 mg by mouth daily as needed for Temperature greater than.     losartan 25 MG tablet  Commonly known as:  COZAAR  25 mg once daily.     ONE DAILY MULTIVITAMIN per tablet  Generic drug:  multivitamin  Take 1 tablet by mouth once daily.     sertraline 50 MG tablet  Commonly known as:  ZOLOFT          Discharge Procedure Orders   Diet general     Call MD for:  temperature >100.4     Call MD for:  persistent nausea and vomiting     Call MD for:  severe uncontrolled pain     Call MD for:  difficulty breathing, headache or visual disturbances     Call MD for:  redness, tenderness, or signs of infection (pain, swelling, redness, odor or green/yellow discharge around incision site)     Call MD for:  hives     Remove dressing in 24 hours     Shower on day dressing removed (No bath)

## 2018-09-26 ENCOUNTER — OFFICE VISIT (OUTPATIENT)
Dept: SURGERY | Facility: CLINIC | Age: 73
End: 2018-09-26
Payer: MEDICARE

## 2018-09-26 DIAGNOSIS — D24.1 INTRADUCTAL PAPILLOMA OF BREAST, RIGHT: Primary | ICD-10-CM

## 2018-09-26 PROCEDURE — 99024 POSTOP FOLLOW-UP VISIT: CPT | Mod: S$GLB,,, | Performed by: SURGERY

## 2018-09-26 NOTE — PROGRESS NOTES
Subjective:       Patient ID: Elizabeth Ayala is a 72 y.o. female.    Chief Complaint: Post-op Evaluation    HPI 71 yo female s/p major duct excision for intraductal papilloma without complaints  Review of Systems   Constitutional: Negative.    HENT: Negative.    Eyes: Negative.    Respiratory: Negative.    Cardiovascular: Negative.    Gastrointestinal: Negative.    Endocrine: Negative.    Musculoskeletal: Negative.    Skin: Negative.    Allergic/Immunologic: Negative.    Neurological: Negative.    Hematological: Negative.    Psychiatric/Behavioral: Negative.    All other systems reviewed and are negative.      Objective:      Physical Exam   Constitutional: She is oriented to person, place, and time. She appears well-developed and well-nourished.   HENT:   Head: Normocephalic and atraumatic.   Right Ear: External ear normal.   Left Ear: External ear normal.   Nose: Nose normal.   Mouth/Throat: Oropharynx is clear and moist.   Eyes: Conjunctivae and EOM are normal. Pupils are equal, round, and reactive to light.   Neck: Normal range of motion. Neck supple.   Cardiovascular: Normal rate, regular rhythm, normal heart sounds and intact distal pulses.   Pulmonary/Chest: Effort normal and breath sounds normal.   Abdominal: Soft. Bowel sounds are normal.   Musculoskeletal: Normal range of motion.   Neurological: She is alert and oriented to person, place, and time. She has normal reflexes.   Skin: Skin is warm and dry.   Psychiatric: She has a normal mood and affect. Her behavior is normal. Thought content normal.   Vitals reviewed.      Assessment:       1. Intraductal papilloma of breast, right        Plan:       I will see her back in 6 months with mammogram of her right breast

## 2019-01-14 ENCOUNTER — TELEPHONE (OUTPATIENT)
Dept: SURGERY | Facility: CLINIC | Age: 74
End: 2019-01-14

## 2019-01-14 DIAGNOSIS — D24.1 INTRADUCTAL PAPILLOMA OF BREAST, RIGHT: Primary | ICD-10-CM

## 2019-01-14 NOTE — TELEPHONE ENCOUNTER
Pt notified mammo order faxed to [facility] and she will call to setup appt      ----- Message from Emilia Silveira sent at 1/14/2019  2:07 PM CST -----  Contact: [phone]  Pt is requesting her mammo appt. And a f/u appt with you. PLs call pt [phone]. Thanks......Lisa

## 2019-01-16 ENCOUNTER — TELEPHONE (OUTPATIENT)
Dept: SURGERY | Facility: CLINIC | Age: 74
End: 2019-01-16

## 2019-01-16 NOTE — TELEPHONE ENCOUNTER
Pt notified of appt with  1/23/19 @ 1:00pm        ----- Message from Vera Macias sent at 1/16/2019  3:33 PM CST -----  Contact: self - [phone]  Pt states she is taking her mammogram next week on 01/22. Pt is asking for a sooner appt than first available on 02/06. She states her breast are still sore from the surgery and would like to f/u sooner.

## 2019-01-23 ENCOUNTER — OFFICE VISIT (OUTPATIENT)
Dept: SURGERY | Facility: CLINIC | Age: 74
End: 2019-01-23
Payer: MEDICARE

## 2019-01-23 VITALS
SYSTOLIC BLOOD PRESSURE: 129 MMHG | HEART RATE: 67 BPM | HEIGHT: 60 IN | WEIGHT: 189 LBS | DIASTOLIC BLOOD PRESSURE: 74 MMHG | BODY MASS INDEX: 37.11 KG/M2

## 2019-01-23 DIAGNOSIS — D24.1 INTRADUCTAL PAPILLOMA OF BREAST, RIGHT: Primary | ICD-10-CM

## 2019-01-23 PROCEDURE — 99024 POSTOP FOLLOW-UP VISIT: CPT | Mod: S$GLB,,, | Performed by: SURGERY

## 2019-01-23 PROCEDURE — 99024 PR POST-OP FOLLOW-UP VISIT: ICD-10-PCS | Mod: S$GLB,,, | Performed by: SURGERY

## 2019-01-23 RX ORDER — ATENOLOL 50 MG/1
50 TABLET ORAL DAILY
Refills: 3 | Status: ON HOLD | COMMUNITY
Start: 2019-01-05 | End: 2024-03-28 | Stop reason: HOSPADM

## 2019-01-23 RX ORDER — SERTRALINE HYDROCHLORIDE 100 MG/1
TABLET, FILM COATED ORAL
Refills: 3 | Status: ON HOLD | COMMUNITY
Start: 2019-01-11 | End: 2024-03-28 | Stop reason: HOSPADM

## 2019-10-09 ENCOUNTER — TELEPHONE (OUTPATIENT)
Dept: SURGERY | Facility: CLINIC | Age: 74
End: 2019-10-09

## 2019-10-09 DIAGNOSIS — D24.1 INTRADUCTAL PAPILLOMA OF BREAST, RIGHT: Primary | ICD-10-CM

## 2019-10-09 NOTE — TELEPHONE ENCOUNTER
Pt notified mammo faxed to [facility]          ----- Message from Tricia Butterfield sent at 10/9/2019  2:59 PM CDT -----  Contact: Patient ph [phone]  Type: Patient Call Back    Who called: Patient    What is the request in detail: Need to make a follow up susannah from surgery she had done almost a year ago. Was told by Dr Knowles to get a mammogram scheduled before seeing him. Patient need to talk to nurse in regards to setting up the appointments. Please call.    Would the patient rather a call back or a response via [facility]? Call back    Best call back number: [phone]

## 2019-10-28 ENCOUNTER — OFFICE VISIT (OUTPATIENT)
Dept: SURGERY | Facility: CLINIC | Age: 74
End: 2019-10-28
Payer: MEDICARE

## 2019-10-28 VITALS
WEIGHT: 170.31 LBS | DIASTOLIC BLOOD PRESSURE: 81 MMHG | SYSTOLIC BLOOD PRESSURE: 139 MMHG | OXYGEN SATURATION: 96 % | BODY MASS INDEX: 33.44 KG/M2 | HEIGHT: 60 IN | HEART RATE: 59 BPM

## 2019-10-28 DIAGNOSIS — D24.1 INTRADUCTAL PAPILLOMA OF BREAST, RIGHT: Primary | ICD-10-CM

## 2019-10-28 PROCEDURE — 99214 PR OFFICE/OUTPT VISIT, EST, LEVL IV, 30-39 MIN: ICD-10-PCS | Mod: S$GLB,,, | Performed by: SURGERY

## 2019-10-28 PROCEDURE — 3079F DIAST BP 80-89 MM HG: CPT | Mod: CPTII,S$GLB,, | Performed by: SURGERY

## 2019-10-28 PROCEDURE — 1101F PT FALLS ASSESS-DOCD LE1/YR: CPT | Mod: CPTII,S$GLB,, | Performed by: SURGERY

## 2019-10-28 PROCEDURE — 3075F SYST BP GE 130 - 139MM HG: CPT | Mod: CPTII,S$GLB,, | Performed by: SURGERY

## 2019-10-28 PROCEDURE — 1101F PR PT FALLS ASSESS DOC 0-1 FALLS W/OUT INJ PAST YR: ICD-10-PCS | Mod: CPTII,S$GLB,, | Performed by: SURGERY

## 2019-10-28 PROCEDURE — 3079F PR MOST RECENT DIASTOLIC BLOOD PRESSURE 80-89 MM HG: ICD-10-PCS | Mod: CPTII,S$GLB,, | Performed by: SURGERY

## 2019-10-28 PROCEDURE — 3075F PR MOST RECENT SYSTOLIC BLOOD PRESS GE 130-139MM HG: ICD-10-PCS | Mod: CPTII,S$GLB,, | Performed by: SURGERY

## 2019-10-28 PROCEDURE — 99214 OFFICE O/P EST MOD 30 MIN: CPT | Mod: S$GLB,,, | Performed by: SURGERY

## 2019-10-28 NOTE — PROGRESS NOTES
Subjective:       Patient ID: Elizabeth Ayala is a 73 y.o. female.    Chief Complaint: Follow-up (f/u with mammo, pt states of soreness in her rt breast. )    HPI 72 yo female with breast pain and a history of papilloma  Review of Systems   Constitutional: Negative.    HENT: Negative.    Eyes: Negative.    Respiratory: Negative.    Cardiovascular: Negative.    Gastrointestinal: Negative.    Endocrine: Negative.    Musculoskeletal: Negative.    Skin: Negative.    Allergic/Immunologic: Negative.    Neurological: Negative.    Hematological: Negative.    Psychiatric/Behavioral: Negative.    All other systems reviewed and are negative.      Objective:      Physical Exam   Constitutional: She is oriented to person, place, and time. She appears well-developed and well-nourished.   HENT:   Head: Normocephalic and atraumatic.   Right Ear: External ear normal.   Left Ear: External ear normal.   Nose: Nose normal.   Mouth/Throat: Oropharynx is clear and moist.   Eyes: Pupils are equal, round, and reactive to light. Conjunctivae and EOM are normal.   Neck: Normal range of motion. Neck supple.   Cardiovascular: Normal rate, regular rhythm, normal heart sounds and intact distal pulses.   Pulmonary/Chest: Effort normal and breath sounds normal.   Abdominal: Soft. Bowel sounds are normal.   Musculoskeletal: Normal range of motion.   Neurological: She is alert and oriented to person, place, and time. She has normal reflexes.   Skin: Skin is warm and dry.   Psychiatric: She has a normal mood and affect. Her behavior is normal. Thought content normal.   Vitals reviewed.      Assessment:       1. Intraductal papilloma of breast, right      no masses  Plan:       I have urged her to take Vit E and a sports bra and I will see her back prn

## 2019-10-30 ENCOUNTER — HOSPITAL ENCOUNTER (EMERGENCY)
Facility: HOSPITAL | Age: 74
Discharge: HOME | End: 2019-10-30
Attending: EMERGENCY MEDICINE
Payer: MEDICARE

## 2019-10-30 VITALS
OXYGEN SATURATION: 99 % | HEIGHT: 60 IN | SYSTOLIC BLOOD PRESSURE: 142 MMHG | HEART RATE: 85 BPM | TEMPERATURE: 99 F | BODY MASS INDEX: 33.18 KG/M2 | DIASTOLIC BLOOD PRESSURE: 66 MMHG | RESPIRATION RATE: 18 BRPM | WEIGHT: 169 LBS

## 2019-10-30 DIAGNOSIS — K52.9 GASTROENTERITIS: ICD-10-CM

## 2019-10-30 DIAGNOSIS — R11.10 VOMITING: ICD-10-CM

## 2019-10-30 DIAGNOSIS — N39.0 URINARY TRACT INFECTION WITHOUT HEMATURIA, SITE UNSPECIFIED: ICD-10-CM

## 2019-10-30 DIAGNOSIS — R11.2 NAUSEA VOMITING AND DIARRHEA: ICD-10-CM

## 2019-10-30 DIAGNOSIS — R19.7 NAUSEA VOMITING AND DIARRHEA: ICD-10-CM

## 2019-10-30 DIAGNOSIS — R19.7 DIARRHEA, UNSPECIFIED TYPE: ICD-10-CM

## 2019-10-30 DIAGNOSIS — R10.30 LOWER ABDOMINAL PAIN: Primary | ICD-10-CM

## 2019-10-30 LAB
ALBUMIN SERPL BCP-MCNC: 4 G/DL (ref 3.5–5.2)
ALP SERPL-CCNC: 73 U/L (ref 55–135)
ALT SERPL W/O P-5'-P-CCNC: 18 U/L (ref 10–44)
ANION GAP SERPL CALC-SCNC: 11 MMOL/L (ref 8–16)
AST SERPL-CCNC: 13 U/L (ref 10–40)
BACTERIA #/AREA URNS HPF: ABNORMAL /HPF
BASOPHILS # BLD AUTO: 0.04 K/UL (ref 0–0.2)
BASOPHILS NFR BLD: 0.2 % (ref 0–1.9)
BILIRUB SERPL-MCNC: 0.9 MG/DL (ref 0.1–1)
BILIRUB UR QL STRIP: NEGATIVE
BUN SERPL-MCNC: 40 MG/DL (ref 8–23)
CALCIUM SERPL-MCNC: 9.1 MG/DL (ref 8.7–10.5)
CHLORIDE SERPL-SCNC: 105 MMOL/L (ref 95–110)
CLARITY UR: CLEAR
CO2 SERPL-SCNC: 22 MMOL/L (ref 23–29)
COLOR UR: YELLOW
CREAT SERPL-MCNC: 0.7 MG/DL (ref 0.5–1.4)
DIFFERENTIAL METHOD: ABNORMAL
EOSINOPHIL # BLD AUTO: 0.1 K/UL (ref 0–0.5)
EOSINOPHIL NFR BLD: 0.6 % (ref 0–8)
ERYTHROCYTE [DISTWIDTH] IN BLOOD BY AUTOMATED COUNT: 13 % (ref 11.5–14.5)
EST. GFR  (AFRICAN AMERICAN): >60 ML/MIN/1.73 M^2
EST. GFR  (NON AFRICAN AMERICAN): >60 ML/MIN/1.73 M^2
GLUCOSE SERPL-MCNC: 137 MG/DL (ref 70–110)
GLUCOSE UR QL STRIP: NEGATIVE
HCT VFR BLD AUTO: 43.3 % (ref 37–48.5)
HGB BLD-MCNC: 14.9 G/DL (ref 12–16)
HGB UR QL STRIP: NEGATIVE
IMM GRANULOCYTES # BLD AUTO: 0.13 K/UL (ref 0–0.04)
IMM GRANULOCYTES NFR BLD AUTO: 0.7 % (ref 0–0.5)
KETONES UR QL STRIP: NEGATIVE
LACTATE SERPL-SCNC: 2.5 MMOL/L (ref 0.5–2.2)
LEUKOCYTE ESTERASE UR QL STRIP: ABNORMAL
LIPASE SERPL-CCNC: 14 U/L (ref 4–60)
LYMPHOCYTES # BLD AUTO: 0.8 K/UL (ref 1–4.8)
LYMPHOCYTES NFR BLD: 4.4 % (ref 18–48)
MCH RBC QN AUTO: 28.4 PG (ref 27–31)
MCHC RBC AUTO-ENTMCNC: 34.4 G/DL (ref 32–36)
MCV RBC AUTO: 83 FL (ref 82–98)
MICROSCOPIC COMMENT: ABNORMAL
MONOCYTES # BLD AUTO: 1 K/UL (ref 0.3–1)
MONOCYTES NFR BLD: 5.5 % (ref 4–15)
NEUTROPHILS # BLD AUTO: 16.9 K/UL (ref 1.8–7.7)
NEUTROPHILS NFR BLD: 88.6 % (ref 38–73)
NITRITE UR QL STRIP: NEGATIVE
NRBC BLD-RTO: 0 /100 WBC
PH UR STRIP: 6 [PH] (ref 5–8)
PLATELET # BLD AUTO: 339 K/UL (ref 150–350)
PMV BLD AUTO: 9.5 FL (ref 9.2–12.9)
POTASSIUM SERPL-SCNC: 3.4 MMOL/L (ref 3.5–5.1)
PROT SERPL-MCNC: 6.9 G/DL (ref 6–8.4)
PROT UR QL STRIP: NEGATIVE
RBC # BLD AUTO: 5.25 M/UL (ref 4–5.4)
RBC #/AREA URNS HPF: 2 /HPF (ref 0–4)
SODIUM SERPL-SCNC: 138 MMOL/L (ref 136–145)
SP GR UR STRIP: >1.03 (ref 1–1.03)
TROPONIN I SERPL DL<=0.01 NG/ML-MCNC: <0.006 NG/ML (ref 0–0.03)
URN SPEC COLLECT METH UR: ABNORMAL
UROBILINOGEN UR STRIP-ACNC: NEGATIVE EU/DL
WBC # BLD AUTO: 19.07 K/UL (ref 3.9–12.7)
WBC #/AREA URNS HPF: 15 /HPF (ref 0–5)

## 2019-10-30 PROCEDURE — 81000 URINALYSIS NONAUTO W/SCOPE: CPT

## 2019-10-30 PROCEDURE — 85025 COMPLETE CBC W/AUTO DIFF WBC: CPT

## 2019-10-30 PROCEDURE — 80053 COMPREHEN METABOLIC PANEL: CPT

## 2019-10-30 PROCEDURE — 87086 URINE CULTURE/COLONY COUNT: CPT

## 2019-10-30 PROCEDURE — 93005 ELECTROCARDIOGRAM TRACING: CPT

## 2019-10-30 PROCEDURE — 99285 EMERGENCY DEPT VISIT HI MDM: CPT | Mod: 25

## 2019-10-30 PROCEDURE — 25000003 PHARM REV CODE 250: Performed by: EMERGENCY MEDICINE

## 2019-10-30 PROCEDURE — 84484 ASSAY OF TROPONIN QUANT: CPT

## 2019-10-30 PROCEDURE — 93010 EKG 12-LEAD: ICD-10-PCS | Mod: ,,, | Performed by: INTERNAL MEDICINE

## 2019-10-30 PROCEDURE — 63600175 PHARM REV CODE 636 W HCPCS: Performed by: EMERGENCY MEDICINE

## 2019-10-30 PROCEDURE — 96365 THER/PROPH/DIAG IV INF INIT: CPT

## 2019-10-30 PROCEDURE — 93010 ELECTROCARDIOGRAM REPORT: CPT | Mod: ,,, | Performed by: INTERNAL MEDICINE

## 2019-10-30 PROCEDURE — 83605 ASSAY OF LACTIC ACID: CPT

## 2019-10-30 PROCEDURE — 83690 ASSAY OF LIPASE: CPT

## 2019-10-30 PROCEDURE — 25500020 PHARM REV CODE 255: Performed by: EMERGENCY MEDICINE

## 2019-10-30 RX ORDER — METRONIDAZOLE 500 MG/1
500 TABLET ORAL 3 TIMES DAILY
Qty: 21 TABLET | Refills: 0 | Status: SHIPPED | OUTPATIENT
Start: 2019-10-30 | End: 2019-11-09

## 2019-10-30 RX ORDER — CIPROFLOXACIN 500 MG/1
500 TABLET ORAL
Status: COMPLETED | OUTPATIENT
Start: 2019-10-30 | End: 2019-10-30

## 2019-10-30 RX ORDER — DICYCLOMINE HYDROCHLORIDE 20 MG/1
20 TABLET ORAL 2 TIMES DAILY PRN
Qty: 20 TABLET | Refills: 0 | Status: SHIPPED | OUTPATIENT
Start: 2019-10-30 | End: 2019-11-29

## 2019-10-30 RX ORDER — METRONIDAZOLE 500 MG/1
500 TABLET ORAL
Status: COMPLETED | OUTPATIENT
Start: 2019-10-30 | End: 2019-10-30

## 2019-10-30 RX ORDER — ONDANSETRON 4 MG/1
4 TABLET, FILM COATED ORAL EVERY 6 HOURS
Qty: 12 TABLET | Refills: 0 | Status: ON HOLD | OUTPATIENT
Start: 2019-10-30 | End: 2024-03-28 | Stop reason: HOSPADM

## 2019-10-30 RX ORDER — CIPROFLOXACIN 500 MG/1
500 TABLET ORAL 2 TIMES DAILY
Qty: 20 TABLET | Refills: 0 | Status: SHIPPED | OUTPATIENT
Start: 2019-10-30 | End: 2019-11-09

## 2019-10-30 RX ADMIN — CIPROFLOXACIN HYDROCHLORIDE 500 MG: 500 TABLET, FILM COATED ORAL at 07:10

## 2019-10-30 RX ADMIN — PROMETHAZINE HYDROCHLORIDE 12.5 MG: 25 INJECTION INTRAMUSCULAR; INTRAVENOUS at 05:10

## 2019-10-30 RX ADMIN — SODIUM CHLORIDE 1000 ML: 0.9 INJECTION, SOLUTION INTRAVENOUS at 05:10

## 2019-10-30 RX ADMIN — IOHEXOL 70 ML: 350 INJECTION, SOLUTION INTRAVENOUS at 06:10

## 2019-10-30 RX ADMIN — METRONIDAZOLE 500 MG: 500 TABLET ORAL at 07:10

## 2019-10-30 NOTE — ED PROVIDER NOTES
Encounter Date: 10/30/2019       History     Chief Complaint   Patient presents with    Diarrhea     pt complains of N/V/D x 2 days. abd pain as well.     74 yo female presents via  EMS with nausea, vomiting, diarrhea, and abdominal pain. Patient reports symptoms started yesterday (Tuesday 10/29/19) at 5pm and have been continuous since that time. Abdominal pain is crampy and lower. No blood in emesis or stool. No urinary complaints. No fevers. Patient has never had similar in the past. Patient notes that she ate leftover Chinese food around 4pm yesterday before symptoms began, but the food was only a day old and had been refrigerated the whole time. Her  ate his leftovers (a different dish from the same restaurant) and is not ill. Patient was unable to take her regular meds today due to vomiting.     No recent med changes.     PMH: CAD s/p stent, HTN, DLD, vitamin D deficiency, vitamin B12 deficiency, GERD, chronic nausea, elevated ALT, myalgia, paresthesias of both feet, cervical DDD, AC joint bone spurs, osteoarthritis    Psych: depression    PSH: R breast lump removal (benign), R breast duct removal (2018), hysterectomy and bilateral oophorectomy, cardiac stent (2002), knee arthroscopy (2008), R knee replacement (2009), L knee replacement (2011), EGD/colonoscopy (2019), tonsillectomy, adenoidectomy        Review of patient's allergies indicates:   Allergen Reactions    Effexor [venlafaxine] Hives, Shortness Of Breath and Other (See Comments)     Dry mouth and chest pain    Bactrim [sulfamethoxazole-trimethoprim]     Codeine Itching and Other (See Comments)     Tightness in throat     Past Medical History:   Diagnosis Date    AC (acromioclavicular) joint bone spurs, unspecified laterality     Coronary artery disease     Depression     Hyperlipidemia     Hypertension     Osteoarthritis     Vaginal delivery     x2     Past Surgical History:   Procedure Laterality Date    ADENOIDECTOMY       BREAST SURGERY      biospy    HYSTERECTOMY      JOINT REPLACEMENT Bilateral     knee    stent-heart      TONSILLECTOMY      TOTAL KNEE ARTHROPLASTY       Family History   Problem Relation Age of Onset    Heart disease Mother     Heart disease Father      Social History     Tobacco Use    Smoking status: Never Smoker    Smokeless tobacco: Never Used   Substance Use Topics    Alcohol use: No    Drug use: No     Review of Systems   Constitutional: Negative for fever.   HENT: Negative for sore throat.    Eyes: Negative for visual disturbance.   Respiratory: Negative for shortness of breath.    Cardiovascular: Negative for chest pain.   Gastrointestinal: Positive for abdominal pain, diarrhea, nausea and vomiting.   Genitourinary: Negative for dysuria.   Musculoskeletal: Negative for neck stiffness.   Skin: Negative for rash.   Neurological: Negative for dizziness.       Physical Exam     Initial Vitals [10/30/19 0533]   BP Pulse Resp Temp SpO2   120/78 80 14 98.3 °F (36.8 °C) 98 %      MAP       --         Physical Exam    Nursing note and vitals reviewed.  Constitutional: She appears well-developed and well-nourished. She is not diaphoretic.   Awake, alert, nontoxic. Appears fatigued.   HENT:   Head: Normocephalic and atraumatic.   Dry oropharynx.   Eyes: Conjunctivae and EOM are normal. Pupils are equal, round, and reactive to light.   Neck: Normal range of motion. Neck supple.   Cardiovascular: Normal rate, regular rhythm, normal heart sounds and intact distal pulses.   No murmur heard.  Pulmonary/Chest: Breath sounds normal. No respiratory distress. She has no wheezes. She has no rhonchi. She has no rales.   Abdominal: Soft. There is tenderness (lower). There is no rebound and no guarding.   Musculoskeletal: Normal range of motion. She exhibits no edema or tenderness.   Neurological: She is alert and oriented to person, place, and time. She has normal strength.   Moving all extremities.   Skin: Skin is  warm and dry. No erythema. No pallor.   Psychiatric: She has a normal mood and affect.         ED Course   Procedures  Labs Reviewed   COMPREHENSIVE METABOLIC PANEL - Abnormal; Notable for the following components:       Result Value    Potassium 3.4 (*)     CO2 22 (*)     Glucose 137 (*)     BUN, Bld 40 (*)     All other components within normal limits   CBC W/ AUTO DIFFERENTIAL - Abnormal; Notable for the following components:    WBC 19.07 (*)     Immature Granulocytes 0.7 (*)     Gran # (ANC) 16.9 (*)     Immature Grans (Abs) 0.13 (*)     Lymph # 0.8 (*)     Gran% 88.6 (*)     Lymph% 4.4 (*)     All other components within normal limits   LIPASE   TROPONIN I   URINALYSIS, REFLEX TO URINE CULTURE   LACTIC ACID, PLASMA     EKG Readings: (Independently Interpreted)   05:52: NSR, HR 75. First degree AV block. No ectopy. No STEMI. Morphology c/w 9/7/18.         Imaging Results          CT Abdomen Pelvis With Contrast (In process)                  Medical Decision Making:   History:   Old Medical Records: I decided to obtain old medical records.  Old Records Summarized: records from previous admission(s) and records from clinic visits.  Initial Assessment:   72 yo female with nausea, vomiting, diarrhea, and abdominal pain.  Differential Diagnosis:   Ddx includes acute gastroenteritis (viral, bacterial), food poisoning (toxin-mediated), MILTON, electrolyte derangement, UTI, pyelonephritis, sepsis, mesenteric ischemia, diverticulitis, appendicitis, other.  Independently Interpreted Test(s):   I have ordered and independently interpreted X-rays - see prior notes.  I have ordered and independently interpreted EKG Reading(s) - see prior notes  Clinical Tests:   Lab Tests: Reviewed and Ordered  Radiological Study: Reviewed and Ordered  Medical Tests: Ordered and Reviewed  ED Management:  EKG no STEMI.    Patient bolus'ed NS 1L IV. She had phenergan 12.5mg IV for nausea.      Labs: WBC 19.07. Other values pending.    Patient is  pending CT abdomen/pelvis for nausea/vomiting/diarrhea, lower abdominal pain, leukocytosis.     Signed out to Dr. Reza Roldan, daytime EM attending, pending above.   Other:   I have discussed this case with another health care provider.                      Clinical Impression:       ICD-10-CM ICD-9-CM   1. Lower abdominal pain R10.30 789.09   2. Vomiting R11.10 787.03   3. Diarrhea, unspecified type R19.7 787.91   4. Nausea vomiting and diarrhea R11.2 787.91    R19.7 787.01                                Dana Delgado MD  10/30/19 0610

## 2019-10-30 NOTE — PROVIDER PROGRESS NOTES - EMERGENCY DEPT.
Encounter Date: 10/30/2019    ED Physician Progress Notes        Physician Note:   Please see note of Dr. Delgado.  I received sign-out patient pending CT and labs.  Patient on repeat abdominal exam was benign.  Tolerating p.o..  Urinalysis positive for UTI and mild colitis on CT.  Starting patient on Cipro Flagyl.  Patient also started on Zofran and Bentyl.  Patient feels much better would like to go home now. I discussed with the patient the diagnosis, treatment plan, indications for return to the emergency department, and for expected follow-up. The patient verbalized an understanding. The patient is asked if there are any questions or concerns. We discuss the case, until all issues are addressed to the patients satisfaction. Patient understands and is agreeable to the plan.   Reza Roldan

## 2019-11-01 LAB — BACTERIA UR CULT: NORMAL

## 2021-08-09 ENCOUNTER — LAB VISIT (OUTPATIENT)
Dept: PRIMARY CARE CLINIC | Facility: CLINIC | Age: 76
End: 2021-08-09
Payer: MEDICARE

## 2021-08-09 DIAGNOSIS — Z20.822 ENCOUNTER FOR LABORATORY TESTING FOR COVID-19 VIRUS: ICD-10-CM

## 2021-08-09 PROCEDURE — U0005 INFEC AGEN DETEC AMPLI PROBE: HCPCS | Performed by: INTERNAL MEDICINE

## 2021-08-09 PROCEDURE — U0003 INFECTIOUS AGENT DETECTION BY NUCLEIC ACID (DNA OR RNA); SEVERE ACUTE RESPIRATORY SYNDROME CORONAVIRUS 2 (SARS-COV-2) (CORONAVIRUS DISEASE [COVID-19]), AMPLIFIED PROBE TECHNIQUE, MAKING USE OF HIGH THROUGHPUT TECHNOLOGIES AS DESCRIBED BY CMS-2020-01-R: HCPCS | Performed by: INTERNAL MEDICINE

## 2021-08-10 LAB
SARS-COV-2 RNA RESP QL NAA+PROBE: NOT DETECTED
SARS-COV-2- CYCLE NUMBER: -1

## 2023-05-03 ENCOUNTER — HOSPITAL ENCOUNTER (EMERGENCY)
Facility: HOSPITAL | Age: 78
Discharge: HOME | End: 2023-05-03
Attending: EMERGENCY MEDICINE
Payer: MEDICARE

## 2023-05-03 VITALS
TEMPERATURE: 99 F | BODY MASS INDEX: 31.41 KG/M2 | DIASTOLIC BLOOD PRESSURE: 68 MMHG | HEIGHT: 60 IN | HEART RATE: 71 BPM | RESPIRATION RATE: 18 BRPM | OXYGEN SATURATION: 99 % | SYSTOLIC BLOOD PRESSURE: 170 MMHG | WEIGHT: 160 LBS

## 2023-05-03 DIAGNOSIS — S00.81XA ABRASION OF FACE, INITIAL ENCOUNTER: ICD-10-CM

## 2023-05-03 DIAGNOSIS — S02.2XXA CLOSED FRACTURE OF NASAL BONE, INITIAL ENCOUNTER: Primary | ICD-10-CM

## 2023-05-03 PROCEDURE — 99284 EMERGENCY DEPT VISIT MOD MDM: CPT | Mod: 25

## 2023-05-03 PROCEDURE — 25000003 PHARM REV CODE 250: Performed by: EMERGENCY MEDICINE

## 2023-05-03 RX ORDER — IBUPROFEN 600 MG/1
600 TABLET ORAL EVERY 6 HOURS PRN
Qty: 20 TABLET | Refills: 0 | Status: ON HOLD | OUTPATIENT
Start: 2023-05-03 | End: 2024-03-28 | Stop reason: HOSPADM

## 2023-05-03 RX ORDER — DOCUSATE SODIUM 100 MG/1
100 CAPSULE, LIQUID FILLED ORAL 2 TIMES DAILY
Qty: 60 CAPSULE | Refills: 0 | Status: ON HOLD | OUTPATIENT
Start: 2023-05-03

## 2023-05-03 RX ORDER — HYDROCODONE BITARTRATE AND ACETAMINOPHEN 5; 325 MG/1; MG/1
1 TABLET ORAL EVERY 8 HOURS PRN
Qty: 6 TABLET | Refills: 0 | Status: ON HOLD | OUTPATIENT
Start: 2023-05-03 | End: 2024-03-28 | Stop reason: HOSPADM

## 2023-05-03 RX ORDER — HYDROCODONE BITARTRATE AND ACETAMINOPHEN 5; 325 MG/1; MG/1
1 TABLET ORAL
Status: COMPLETED | OUTPATIENT
Start: 2023-05-03 | End: 2023-05-03

## 2023-05-03 RX ADMIN — HYDROCODONE BITARTRATE AND ACETAMINOPHEN 1 TABLET: 5; 325 TABLET ORAL at 04:05

## 2023-05-03 RX ADMIN — BACITRACIN ZINC, NEOMYCIN, POLYMYXIN B 1 EACH: 400; 3.5; 5 OINTMENT TOPICAL at 04:05

## 2023-05-03 NOTE — DISCHARGE INSTRUCTIONS
CT scan does not show any injuries in the brain or neck.  There is a small nasal bone fracture.  Use ice packs to the nose to decrease swelling.  Use ibuprofen as needed for pain.  Use Norco as needed for severe pain not improved by ibuprofen.  Use Colace while using Norco to prevent constipation.  Do not drive or operate heavy machinery when taking Norco as it can cause drowsiness and decrease coordination.  Schedule close follow-up with ENT as well as your primary physician.    Thank you for coming to our Emergency Department today. It is important to remember that some problems are difficult to diagnose and may not be found during your first visit. Be sure to follow up with your primary care doctor and review any labs/imaging that was performed with them. If you do not have a primary care doctor, you may contact the one listed on your discharge paperwork or you may also call the [facility] Appointment Desk at [phone] to schedule an appointment with one.     All medications may potentially have side effects and it is impossible to predict which medications may give you side effects. If you feel that you are having a negative effect of any medication you should immediately stop taking them and seek medical attention.    Return to the ER with any questions/concerns, new/concerning symptoms, worsening or failure to improve. Do not drive or make any important decisions for 24 hours if you have received any pain medications, sedatives or mood altering drugs during your ER visit.

## 2023-05-03 NOTE — ED PROVIDER NOTES
Encounter Date: 5/3/2023    SCRIBE #1 NOTE: I, Patricia Castano, am scribing for, and in the presence of,  Alpesh Coley MD. I have scribed the following portions of the note - Other sections scribed: HPI, ROS, PE.     History     Chief Complaint   Patient presents with    Fall     Pt BIB EMS from home for a fall. EMS reported Pt fell onto her face. Pt has an abrasion to her forehead and swelling and possible deformity to nose. Pt denied neck or back pain. Pt stated she was walking her dog when she tripped.     This is a 77 y.o. female, with a PMHx of AC, Osteoarthritis, and HTN, who presents to the ED for evaluation after a fall today. Patient reports walking her dog today and when she bent down to get the dog's collar she fell forward, falling face forward. Patient notes she is usually off balance prior to the fall. Patient is complaining of laceration on the bridge of the nose, abrasion to the bilateral knees, bilateral knee pain, and facial pain. Patient notes she has chronic soreness in her knees due to her having osteoarthritis. No other exacerbating or alleviating factors. Patient denies syncope, neck pain, nausea, vomiting, numbness, or other associated symptoms. This is the extent of the patient's complaints today in the Emergency Department.       The history is provided by the patient. No  was used.   Review of patient's allergies indicates:   Allergen Reactions    Effexor [venlafaxine] Hives, Shortness Of Breath and Other (See Comments)     Dry mouth and chest pain    Bactrim [sulfamethoxazole-trimethoprim]     Codeine Itching and Other (See Comments)     Tightness in throat     Past Medical History:   Diagnosis Date    AC (acromioclavicular) joint bone spurs, unspecified laterality     Coronary artery disease     Depression     Hyperlipidemia     Hypertension     Osteoarthritis     Vaginal delivery     x2     Past Surgical History:   Procedure Laterality Date    ADENOIDECTOMY       BREAST SURGERY      biospy    HYSTERECTOMY      JOINT REPLACEMENT Bilateral     knee    stent-heart      TONSILLECTOMY      TOTAL KNEE ARTHROPLASTY       Family History   Problem Relation Age of Onset    Heart disease Mother     Heart disease Father      Social History     Tobacco Use    Smoking status: Never    Smokeless tobacco: Never   Substance Use Topics    Alcohol use: No    Drug use: No     Review of Systems   Constitutional:  Negative for fever.   HENT:  Negative for facial swelling and voice change.         (+) facial pain.   Eyes:  Negative for pain.   Respiratory:  Negative for choking and shortness of breath.    Cardiovascular:  Negative for chest pain.   Gastrointestinal:  Negative for abdominal pain, nausea and vomiting.   Genitourinary:  Negative for dysuria and frequency.   Musculoskeletal:  Positive for arthralgias (bilateral knees). Negative for back pain and neck pain.   Skin:  Positive for wound (laceration to the bridge of the nose, abrasion to the bilateral knees). Negative for rash.   Neurological:  Negative for syncope, weakness and numbness.   Psychiatric/Behavioral:  Negative for self-injury.      Physical Exam     Initial Vitals [05/03/23 1248]   BP Pulse Resp Temp SpO2   (!) 144/86 80 18 98.8 °F (37.1 °C) 98 %      MAP       --         Physical Exam    Nursing note and vitals reviewed.  Constitutional: She is not diaphoretic. No distress.   HENT:   Head: Normocephalic.   Protecting airway  Ecchymosis under left eye   Eyes: Conjunctivae are normal. No scleral icterus.   Neck: Neck supple. No tracheal deviation present.   Normal range of motion.  Cardiovascular:  Normal rate, regular rhythm and intact distal pulses.           Pulmonary/Chest: No stridor. No respiratory distress.   Speaking in full sentences   Abdominal: Abdomen is soft. She exhibits no distension. There is no abdominal tenderness.   Musculoskeletal:         General: No tenderness or edema.      Cervical back: Normal range  of motion and neck supple.      Right knee: No deformity.      Left knee: No deformity.      Comments: Pelvis is stable. No midline vertebral tenderness.      Neurological: She is alert. She has normal strength. No cranial nerve deficit or sensory deficit.   Skin: Skin is warm and dry.   Sub-centimeter laceration to the nasal bridge with ecchymosis noted. Faint abrasion to the bilateral knees.    Psychiatric: She has a normal mood and affect.       ED Course   Procedures  Labs Reviewed - No data to display       Imaging Results               CT Maxillofacial Without Contrast (Final result)  Result time 05/03/23 15:17:55      Final result by Jaime Liz MD (05/03/23 15:17:55)                   Impression:      Small fracture of the tip of the nose on the left could be recent.  Minimal depression on the left.  Recommend clinical correlation.    This report was flagged in Epic as abnormal.      Electronically signed by: Jaime Liz  Date:    05/03/2023  Time:    15:17               Narrative:    EXAMINATION:  CT MAXILLOFACIAL WITHOUT CONTRAST    CLINICAL HISTORY:  Facial trauma, blunt;    TECHNIQUE:  Low dose axial images, sagittal and coronal reformations were obtained through the face.  Contrast was not administered.    COMPARISON:  None    FINDINGS:  Minimal irregularity near the tip of the nose on the left could represent a small nasal fracture, which could be recent.  Recommend clinical correlation.    No fractures elsewhere.  Zygomatic arches are intact.  Orbits are intact.  The mandible is intact.                                       CT Head Without Contrast (Final result)  Result time 05/03/23 15:18:07      Final result by Jaime Liz MD (05/03/23 15:18:07)                   Impression:      1. No acute intracranial process.  2. Involutional changes with chronic microvascular ischemic changes.      Electronically signed by: Jaime Liz  Date:    05/03/2023  Time:    15:18               Narrative:     EXAMINATION:  CT HEAD WITHOUT CONTRAST    CLINICAL HISTORY:  Head trauma, minor (Age >= 65y);    TECHNIQUE:  Low dose axial CT images obtained throughout the head without intravenous contrast. Sagittal and coronal reconstructions were performed.    COMPARISON:  None.    FINDINGS:  Intracranial compartment:    Ventricles and sulci are normal in size for age without evidence of hydrocephalus. No extra-axial blood or fluid collections.    Moderate involutional changes and chronic microvascular ischemic changes in the periventricular white matter.  No parenchymal mass, hemorrhage, edema or major vascular distribution infarct.    Skull/extracranial contents (limited evaluation): No fracture. Mastoid air cells and paranasal sinuses are essentially clear.                                       CT Cervical Spine Without Contrast (Final result)  Result time 05/03/23 15:17:41      Final result by Jaime Liz MD (05/03/23 15:17:41)                   Impression:      1. No acute abnormality  2. Multilevel chronic and degenerative changes.  See above comments.      Electronically signed by: Jaime Liz  Date:    05/03/2023  Time:    15:17               Narrative:    EXAMINATION:  CT CERVICAL SPINE WITHOUT CONTRAST    CLINICAL HISTORY:  Neck trauma (Age >= 65y);    TECHNIQUE:  Low dose axial CT images through the cervical spine, with sagittal and coronal reformations.  Contrast was not administered.    COMPARISON:  None    FINDINGS:  No acute fractures of the cervical spine.  Minimal grade 1 spondylolisthesis of C3 on C4, C4 on C5 and C5 on C6.  Mild-moderate bilateral facet arthropathy from C2 through C6 bilaterally.    Mild disc space narrowing throughout the cervical spine.    Mild diffuse posterior disc osteophyte complex at multiple levels.    Central canal is adequately maintained.    Severe foraminal narrowing bilaterally at C3-4, C5-6 on the right and C6-7 on the left.    Limited evaluation of the intraspinal  contents demonstrates no hematoma or mass.Paraspinal soft tissues exhibit no acute abnormalities.  No 1.5 cm calcified thyroid nodule on the left.                                       Medications   neomycin-bacitracnZn-polymyxnB packet (1 each Topical (Top) Given 5/3/23 1602)   HYDROcodone-acetaminophen 5-325 mg per tablet 1 tablet (1 tablet Oral Given 5/3/23 1602)     Medical Decision Making:   History:   Old Medical Records: I decided to obtain old medical records.  Differential Diagnosis:   Differential diagnosis include but are not limited to:    Contusion, abrasion, strain, sprain, fracture.  Clinical Tests:   Radiological Study: Ordered and Reviewed  ED Management:  Patient is afebrile and in no acute distress time history and physical.  She has a GCS of 15.  She has no nasal septal hematoma.        Scribe Attestation:   Scribe #1: I performed the above scribed service and the documentation accurately describes the services I performed. I attest to the accuracy of the note.               I, Alpesh Coley , personally performed the services described in this documentation. All medical record entries made by the scribe were at my direction and in my presence. I have reviewed the chart and agree that the record reflects my personal performance and is accurate and complete.     Clinical Impression:   Final diagnoses:  [S02.2XXA] Closed fracture of nasal bone, initial encounter (Primary)  [S00.81XA] Abrasion of face, initial encounter        ED Disposition Condition    Discharge Stable          ED Prescriptions       Medication Sig Dispense Start Date End Date Auth. Provider    ibuprofen (ADVIL,MOTRIN) 600 MG tablet Take 1 tablet (600 mg total) by mouth every 6 (six) hours as needed for Pain or Temperature greater than (100.5). Take with food to prevent upset stomach 20 tablet 5/3/2023 -- Alpesh Coley MD    HYDROcodone-acetaminophen (NORCO) 5-325 mg per tablet Take 1 tablet by mouth every 8 (eight) hours as  needed (Severe pain not improved by other medications). 6 tablet 5/3/2023 -- Alpesh Coley MD    docusate sodium (COLACE) 100 MG capsule Take 1 capsule (100 mg total) by mouth 2 (two) times daily. Use while taking Norco to prevent constipation 60 capsule 5/3/2023 -- Alpesh Coley MD          Follow-up Information       Follow up With Specialties Details Why Contact Info    Becky Granado MD Otolaryngology Schedule an appointment as soon as possible for a visit   [address] [place] [address]  [phone]      Amna Levy MD General Practice Schedule an appointment as soon as possible for a visit   [address] [facility]  [id number]  [place] [address]  [phone]               Alpesh Coley MD  05/03/23 2290

## 2023-05-03 NOTE — ED TRIAGE NOTES
Pt BIB EMS for fall pta. Pt tripped and fell while trying to catch her dog. Pt fell on face. Hit concrete. Abrasions to nose and face noted. Left periorbital bruising noted. Deformity noted to nose. Abrasions noted to bilateral knees. Pt denies any LOC or vomiting. No blood thinners. Pt AAOx4. VSS. No distress noted. Will continue to monitor.

## 2024-03-25 ENCOUNTER — HOSPITAL ENCOUNTER (INPATIENT)
Facility: HOSPITAL | Age: 79
LOS: 3 days | Discharge: HOME | DRG: 322 | End: 2024-03-28
Attending: EMERGENCY MEDICINE | Admitting: INTERNAL MEDICINE
Payer: MEDICARE

## 2024-03-25 DIAGNOSIS — I21.3 STEMI (ST ELEVATION MYOCARDIAL INFARCTION): ICD-10-CM

## 2024-03-25 DIAGNOSIS — R07.9 CHEST PAIN: ICD-10-CM

## 2024-03-25 DIAGNOSIS — R06.02 SOB (SHORTNESS OF BREATH): ICD-10-CM

## 2024-03-25 DIAGNOSIS — Z95.820 STATUS POST ANGIOPLASTY WITH STENT: ICD-10-CM

## 2024-03-25 DIAGNOSIS — I25.10 CAD (CORONARY ARTERY DISEASE): ICD-10-CM

## 2024-03-25 DIAGNOSIS — I21.3 ST ELEVATION MYOCARDIAL INFARCTION (STEMI), UNSPECIFIED ARTERY: Primary | ICD-10-CM

## 2024-03-25 LAB
ABO + RH BLD: NORMAL
ALBUMIN SERPL BCP-MCNC: 4.1 G/DL (ref 3.5–5.2)
ALLENS TEST: ABNORMAL
ALP SERPL-CCNC: 102 U/L (ref 55–135)
ALT SERPL W/O P-5'-P-CCNC: 16 U/L (ref 10–44)
ANION GAP SERPL CALC-SCNC: 13 MMOL/L (ref 8–16)
ASCENDING AORTA: 3.09 CM
AST SERPL-CCNC: 24 U/L (ref 10–40)
AV INDEX (PROSTH): 0.52
AV MEAN GRADIENT: 2 MMHG
AV PEAK GRADIENT: 3 MMHG
AV REGURGITATION PRESSURE HALF TIME: 438.44 MS
AV VALVE AREA BY VELOCITY RATIO: 2.74 CM²
AV VALVE AREA: 2 CM²
AV VELOCITY RATIO: 0.71
BASOPHILS # BLD AUTO: 0.03 K/UL (ref 0–0.2)
BASOPHILS NFR BLD: 0.3 % (ref 0–1.9)
BILIRUB SERPL-MCNC: 0.8 MG/DL (ref 0.1–1)
BLD GP AB SCN CELLS X3 SERPL QL: NORMAL
BNP SERPL-MCNC: 278 PG/ML (ref 0–99)
BSA FOR ECHO PROCEDURE: 1.69 M2
BUN SERPL-MCNC: 9 MG/DL (ref 8–23)
CALCIUM SERPL-MCNC: 10.3 MG/DL (ref 8.7–10.5)
CHLORIDE SERPL-SCNC: 103 MMOL/L (ref 95–110)
CO2 SERPL-SCNC: 23 MMOL/L (ref 23–29)
CREAT SERPL-MCNC: 0.9 MG/DL (ref 0.5–1.4)
CV ECHO LV RWT: 0.57 CM
DIFFERENTIAL METHOD BLD: ABNORMAL
DOP CALC AO PEAK VEL: 0.91 M/S
DOP CALC AO VTI: 19 CM
DOP CALC LVOT AREA: 3.8 CM2
DOP CALC LVOT DIAMETER: 2.21 CM
DOP CALC LVOT PEAK VEL: 0.65 M/S
DOP CALC LVOT STROKE VOLUME: 37.96 CM3
DOP CALCLVOT PEAK VEL VTI: 9.9 CM
E WAVE DECELERATION TIME: 170.43 MSEC
E/A RATIO: 0.52
E/E' RATIO: 8.6 M/S
ECHO LV POSTERIOR WALL: 1.08 CM (ref 0.6–1.1)
EOSINOPHIL # BLD AUTO: 0.2 K/UL (ref 0–0.5)
EOSINOPHIL NFR BLD: 1.9 % (ref 0–8)
ERYTHROCYTE [DISTWIDTH] IN BLOOD BY AUTOMATED COUNT: 12.6 % (ref 11.5–14.5)
EST. GFR  (NO RACE VARIABLE): >60 ML/MIN/1.73 M^2
FRACTIONAL SHORTENING: 42 % (ref 28–44)
GLUCOSE SERPL-MCNC: 110 MG/DL (ref 70–110)
HCT VFR BLD AUTO: 46 % (ref 37–48.5)
HGB BLD-MCNC: 16 G/DL (ref 12–16)
IMM GRANULOCYTES # BLD AUTO: 0.03 K/UL (ref 0–0.04)
IMM GRANULOCYTES NFR BLD AUTO: 0.3 % (ref 0–0.5)
INR PPP: 1 (ref 0.8–1.2)
INTERVENTRICULAR SEPTUM: 1.01 CM (ref 0.6–1.1)
IVC DIAMETER: 0.8 CM
LA MAJOR: 3.17 CM
LA MINOR: 3.45 CM
LA WIDTH: 3.9 CM
LEFT ATRIUM SIZE: 2.99 CM
LEFT ATRIUM VOLUME INDEX: 19.8 ML/M2
LEFT ATRIUM VOLUME: 32.75 CM3
LEFT INTERNAL DIMENSION IN SYSTOLE: 2.2 CM (ref 2.1–4)
LEFT VENTRICLE DIASTOLIC VOLUME INDEX: 36.9 ML/M2
LEFT VENTRICLE DIASTOLIC VOLUME: 60.89 ML
LEFT VENTRICLE MASS INDEX: 75 G/M2
LEFT VENTRICLE SYSTOLIC VOLUME INDEX: 9.8 ML/M2
LEFT VENTRICLE SYSTOLIC VOLUME: 16.13 ML
LEFT VENTRICULAR INTERNAL DIMENSION IN DIASTOLE: 3.77 CM (ref 3.5–6)
LEFT VENTRICULAR MASS: 123.44 G
LV LATERAL E/E' RATIO: 8.6 M/S
LV SEPTAL E/E' RATIO: 8.6 M/S
LVOT MG: 0.95 MMHG
LVOT MV: 0.46 CM/S
LYMPHOCYTES # BLD AUTO: 2.6 K/UL (ref 1–4.8)
LYMPHOCYTES NFR BLD: 27.7 % (ref 18–48)
MCH RBC QN AUTO: 28.7 PG (ref 27–31)
MCHC RBC AUTO-ENTMCNC: 34.8 G/DL (ref 32–36)
MCV RBC AUTO: 83 FL (ref 82–98)
MONOCYTES # BLD AUTO: 0.7 K/UL (ref 0.3–1)
MONOCYTES NFR BLD: 7.6 % (ref 4–15)
MV PEAK A VEL: 0.82 M/S
MV PEAK E VEL: 0.43 M/S
MV STENOSIS PRESSURE HALF TIME: 49.42 MS
MV VALVE AREA P 1/2 METHOD: 4.45 CM2
NEUTROPHILS # BLD AUTO: 5.9 K/UL (ref 1.8–7.7)
NEUTROPHILS NFR BLD: 62.2 % (ref 38–73)
NRBC BLD-RTO: 0 /100 WBC
PISA AR MAX VEL: 3.68 M/S
PISA TR MAX VEL: 2.29 M/S
PLATELET # BLD AUTO: 306 K/UL (ref 150–450)
PMV BLD AUTO: 9.2 FL (ref 9.2–12.9)
POC ACTIVATED CLOTTING TIME K: 298 SEC (ref 74–137)
POC ACTIVATED CLOTTING TIME K: 336 SEC (ref 74–137)
POCT GLUCOSE: 107 MG/DL (ref 70–110)
POCT GLUCOSE: 97 MG/DL (ref 70–110)
POTASSIUM SERPL-SCNC: 3.2 MMOL/L (ref 3.5–5.1)
PROT SERPL-MCNC: 7.6 G/DL (ref 6–8.4)
PROTHROMBIN TIME: 11.1 SEC (ref 9–12.5)
PV PEAK GRADIENT: 2 MMHG
PV PEAK VELOCITY: 0.76 M/S
RA MAJOR: 4.11 CM
RA PRESSURE ESTIMATED: 3 MMHG
RA WIDTH: 3.1 CM
RBC # BLD AUTO: 5.57 M/UL (ref 4–5.4)
RIGHT VENTRICULAR END-DIASTOLIC DIMENSION: 3.45 CM
RV TB RVSP: 5 MMHG
RV TISSUE DOPPLER FREE WALL SYSTOLIC VELOCITY 1 (APICAL 4 CHAMBER VIEW): 12.01 CM/S
SAMPLE: ABNORMAL
SAMPLE: ABNORMAL
SINUS: 3.05 CM
SITE: ABNORMAL
SODIUM SERPL-SCNC: 139 MMOL/L (ref 136–145)
SPECIMEN OUTDATE: NORMAL
STJ: 2.84 CM
TDI LATERAL: 0.05 M/S
TDI SEPTAL: 0.05 M/S
TDI: 0.05 M/S
TR MAX PG: 21 MMHG
TRICUSPID ANNULAR PLANE SYSTOLIC EXCURSION: 1.5 CM
TROPONIN I SERPL DL<=0.01 NG/ML-MCNC: 0.13 NG/ML (ref 0–0.03)
TROPONIN I SERPL DL<=0.01 NG/ML-MCNC: 0.14 NG/ML (ref 0–0.03)
TV REST PULMONARY ARTERY PRESSURE: 24 MMHG
WBC # BLD AUTO: 9.5 K/UL (ref 3.9–12.7)
Z-SCORE OF LEFT VENTRICULAR DIMENSION IN END DIASTOLE: -2.06
Z-SCORE OF LEFT VENTRICULAR DIMENSION IN END SYSTOLE: -2.1

## 2024-03-25 PROCEDURE — 84484 ASSAY OF TROPONIN QUANT: CPT | Mod: 91 | Performed by: INTERNAL MEDICINE

## 2024-03-25 PROCEDURE — 25000003 PHARM REV CODE 250: Performed by: INTERNAL MEDICINE

## 2024-03-25 PROCEDURE — 36415 COLL VENOUS BLD VENIPUNCTURE: CPT | Performed by: EMERGENCY MEDICINE

## 2024-03-25 PROCEDURE — 99152 MOD SED SAME PHYS/QHP 5/>YRS: CPT | Mod: ,,, | Performed by: INTERNAL MEDICINE

## 2024-03-25 PROCEDURE — 25000003 PHARM REV CODE 250: Performed by: EMERGENCY MEDICINE

## 2024-03-25 PROCEDURE — 93005 ELECTROCARDIOGRAM TRACING: CPT

## 2024-03-25 PROCEDURE — 36415 COLL VENOUS BLD VENIPUNCTURE: CPT | Mod: XB | Performed by: INTERNAL MEDICINE

## 2024-03-25 PROCEDURE — 93010 ELECTROCARDIOGRAM REPORT: CPT | Mod: ,,, | Performed by: INTERNAL MEDICINE

## 2024-03-25 PROCEDURE — 99291 CRITICAL CARE FIRST HOUR: CPT | Mod: 25,,, | Performed by: INTERNAL MEDICINE

## 2024-03-25 PROCEDURE — 27201423 OPTIME MED/SURG SUP & DEVICES STERILE SUPPLY: Performed by: INTERNAL MEDICINE

## 2024-03-25 PROCEDURE — 85025 COMPLETE CBC W/AUTO DIFF WBC: CPT | Performed by: EMERGENCY MEDICINE

## 2024-03-25 PROCEDURE — 25500020 PHARM REV CODE 255: Performed by: INTERNAL MEDICINE

## 2024-03-25 PROCEDURE — 027034Z DILATION OF CORONARY ARTERY, ONE ARTERY WITH DRUG-ELUTING INTRALUMINAL DEVICE, PERCUTANEOUS APPROACH: ICD-10-PCS | Performed by: INTERNAL MEDICINE

## 2024-03-25 PROCEDURE — C1894 INTRO/SHEATH, NON-LASER: HCPCS | Performed by: INTERNAL MEDICINE

## 2024-03-25 PROCEDURE — 93454 CORONARY ARTERY ANGIO S&I: CPT | Mod: XS | Performed by: INTERNAL MEDICINE

## 2024-03-25 PROCEDURE — B2111ZZ FLUOROSCOPY OF MULTIPLE CORONARY ARTERIES USING LOW OSMOLAR CONTRAST: ICD-10-PCS | Performed by: INTERNAL MEDICINE

## 2024-03-25 PROCEDURE — 99285 EMERGENCY DEPT VISIT HI MDM: CPT | Mod: 25

## 2024-03-25 PROCEDURE — 85610 PROTHROMBIN TIME: CPT | Performed by: EMERGENCY MEDICINE

## 2024-03-25 PROCEDURE — 99153 MOD SED SAME PHYS/QHP EA: CPT | Performed by: INTERNAL MEDICINE

## 2024-03-25 PROCEDURE — 20000000 HC ICU ROOM

## 2024-03-25 PROCEDURE — C9606 PERC D-E COR REVASC W AMI S: HCPCS | Mod: RC | Performed by: INTERNAL MEDICINE

## 2024-03-25 PROCEDURE — C1760 CLOSURE DEV, VASC: HCPCS | Performed by: INTERNAL MEDICINE

## 2024-03-25 PROCEDURE — 25000242 PHARM REV CODE 250 ALT 637 W/ HCPCS

## 2024-03-25 PROCEDURE — C1887 CATHETER, GUIDING: HCPCS | Performed by: INTERNAL MEDICINE

## 2024-03-25 PROCEDURE — 85347 COAGULATION TIME ACTIVATED: CPT | Performed by: INTERNAL MEDICINE

## 2024-03-25 PROCEDURE — 92941 PRQ TRLML REVSC TOT OCCL AMI: CPT | Mod: RC,,, | Performed by: INTERNAL MEDICINE

## 2024-03-25 PROCEDURE — 84484 ASSAY OF TROPONIN QUANT: CPT | Performed by: EMERGENCY MEDICINE

## 2024-03-25 PROCEDURE — 99152 MOD SED SAME PHYS/QHP 5/>YRS: CPT | Performed by: INTERNAL MEDICINE

## 2024-03-25 PROCEDURE — 4A023N7 MEASUREMENT OF CARDIAC SAMPLING AND PRESSURE, LEFT HEART, PERCUTANEOUS APPROACH: ICD-10-PCS | Performed by: INTERNAL MEDICINE

## 2024-03-25 PROCEDURE — C1753 CATH, INTRAVAS ULTRASOUND: HCPCS | Performed by: INTERNAL MEDICINE

## 2024-03-25 PROCEDURE — 93454 CORONARY ARTERY ANGIO S&I: CPT | Mod: 26,XS,, | Performed by: INTERNAL MEDICINE

## 2024-03-25 PROCEDURE — 86901 BLOOD TYPING SEROLOGIC RH(D): CPT | Performed by: EMERGENCY MEDICINE

## 2024-03-25 PROCEDURE — 63600175 PHARM REV CODE 636 W HCPCS: Performed by: INTERNAL MEDICINE

## 2024-03-25 PROCEDURE — C1769 GUIDE WIRE: HCPCS | Performed by: INTERNAL MEDICINE

## 2024-03-25 PROCEDURE — C1725 CATH, TRANSLUMIN NON-LASER: HCPCS | Performed by: INTERNAL MEDICINE

## 2024-03-25 PROCEDURE — 80053 COMPREHEN METABOLIC PANEL: CPT | Performed by: EMERGENCY MEDICINE

## 2024-03-25 PROCEDURE — 83880 ASSAY OF NATRIURETIC PEPTIDE: CPT | Performed by: EMERGENCY MEDICINE

## 2024-03-25 PROCEDURE — 93010 ELECTROCARDIOGRAM REPORT: CPT | Mod: 76,,, | Performed by: INTERNAL MEDICINE

## 2024-03-25 PROCEDURE — C1874 STENT, COATED/COV W/DEL SYS: HCPCS | Performed by: INTERNAL MEDICINE

## 2024-03-25 DEVICE — EVEROLIMUS-ELUTING PLATINUM CHROMIUM CORONARY STENT SYSTEM
Type: IMPLANTABLE DEVICE | Site: CORONARY | Status: FUNCTIONAL
Brand: SYNERGY™ XD

## 2024-03-25 RX ORDER — NITROGLYCERIN 0.4 MG/1
0.4 TABLET SUBLINGUAL EVERY 5 MIN PRN
Status: DISCONTINUED | OUTPATIENT
Start: 2024-03-25 | End: 2024-03-28 | Stop reason: HOSPADM

## 2024-03-25 RX ORDER — METOPROLOL SUCCINATE 50 MG/1
50 TABLET, EXTENDED RELEASE ORAL DAILY
Status: DISCONTINUED | OUTPATIENT
Start: 2024-03-25 | End: 2024-03-28 | Stop reason: HOSPADM

## 2024-03-25 RX ORDER — ATORVASTATIN CALCIUM 40 MG/1
80 TABLET, FILM COATED ORAL DAILY
Status: CANCELLED | OUTPATIENT
Start: 2024-03-25

## 2024-03-25 RX ORDER — NAPROXEN SODIUM 220 MG/1
81 TABLET, FILM COATED ORAL DAILY
Status: DISCONTINUED | OUTPATIENT
Start: 2024-03-26 | End: 2024-03-28 | Stop reason: HOSPADM

## 2024-03-25 RX ORDER — ASPIRIN 325 MG
325 TABLET, DELAYED RELEASE (ENTERIC COATED) ORAL
Status: COMPLETED | OUTPATIENT
Start: 2024-03-25 | End: 2024-03-25

## 2024-03-25 RX ORDER — CLOPIDOGREL BISULFATE 75 MG/1
75 TABLET ORAL DAILY
Status: DISCONTINUED | OUTPATIENT
Start: 2024-03-26 | End: 2024-03-28 | Stop reason: HOSPADM

## 2024-03-25 RX ORDER — HEPARIN SODIUM 1000 [USP'U]/ML
INJECTION, SOLUTION INTRAVENOUS; SUBCUTANEOUS
Status: DISCONTINUED | OUTPATIENT
Start: 2024-03-25 | End: 2024-03-25 | Stop reason: HOSPADM

## 2024-03-25 RX ORDER — LIDOCAINE HYDROCHLORIDE 10 MG/ML
INJECTION, SOLUTION EPIDURAL; INFILTRATION; INTRACAUDAL; PERINEURAL
Status: DISCONTINUED | OUTPATIENT
Start: 2024-03-25 | End: 2024-03-25 | Stop reason: HOSPADM

## 2024-03-25 RX ORDER — FENTANYL CITRATE 50 UG/ML
50 INJECTION, SOLUTION INTRAMUSCULAR; INTRAVENOUS
Status: DISCONTINUED | OUTPATIENT
Start: 2024-03-25 | End: 2024-03-28 | Stop reason: HOSPADM

## 2024-03-25 RX ORDER — MIDAZOLAM HYDROCHLORIDE 1 MG/ML
INJECTION, SOLUTION INTRAMUSCULAR; INTRAVENOUS
Status: DISCONTINUED | OUTPATIENT
Start: 2024-03-25 | End: 2024-03-25 | Stop reason: HOSPADM

## 2024-03-25 RX ORDER — DOCUSATE SODIUM 100 MG/1
100 CAPSULE, LIQUID FILLED ORAL 2 TIMES DAILY
Status: CANCELLED | OUTPATIENT
Start: 2024-03-25

## 2024-03-25 RX ORDER — FENTANYL CITRATE 50 UG/ML
INJECTION, SOLUTION INTRAMUSCULAR; INTRAVENOUS
Status: DISCONTINUED | OUTPATIENT
Start: 2024-03-25 | End: 2024-03-25 | Stop reason: HOSPADM

## 2024-03-25 RX ORDER — ONDANSETRON HYDROCHLORIDE 2 MG/ML
4 INJECTION, SOLUTION INTRAVENOUS EVERY 12 HOURS PRN
Status: DISCONTINUED | OUTPATIENT
Start: 2024-03-25 | End: 2024-03-28 | Stop reason: HOSPADM

## 2024-03-25 RX ORDER — CLOPIDOGREL BISULFATE 300 MG/1
600 TABLET, FILM COATED ORAL
Status: COMPLETED | OUTPATIENT
Start: 2024-03-25 | End: 2024-03-25

## 2024-03-25 RX ORDER — ACETAMINOPHEN 325 MG/1
650 TABLET ORAL EVERY 4 HOURS PRN
Status: DISCONTINUED | OUTPATIENT
Start: 2024-03-25 | End: 2024-03-28 | Stop reason: HOSPADM

## 2024-03-25 RX ORDER — NITROGLYCERIN 0.4 MG/1
TABLET SUBLINGUAL
Status: COMPLETED
Start: 2024-03-25 | End: 2024-03-25

## 2024-03-25 RX ORDER — ATORVASTATIN CALCIUM 40 MG/1
80 TABLET, FILM COATED ORAL NIGHTLY
Status: DISCONTINUED | OUTPATIENT
Start: 2024-03-25 | End: 2024-03-28 | Stop reason: HOSPADM

## 2024-03-25 RX ORDER — ISOSORBIDE MONONITRATE 30 MG/1
30 TABLET, EXTENDED RELEASE ORAL DAILY
Status: DISCONTINUED | OUTPATIENT
Start: 2024-03-26 | End: 2024-03-28 | Stop reason: HOSPADM

## 2024-03-25 RX ADMIN — NITROGLYCERIN 0.4 MG: 0.4 TABLET, ORALLY DISINTEGRATING SUBLINGUAL at 09:03

## 2024-03-25 RX ADMIN — NITROGLYCERIN 0.4 MG: 0.4 TABLET SUBLINGUAL at 09:03

## 2024-03-25 RX ADMIN — ATORVASTATIN CALCIUM 80 MG: 40 TABLET, FILM COATED ORAL at 10:03

## 2024-03-25 RX ADMIN — POTASSIUM BICARBONATE 60 MEQ: 391 TABLET, EFFERVESCENT ORAL at 10:03

## 2024-03-25 RX ADMIN — CLOPIDOGREL BISULFATE 600 MG: 300 TABLET, FILM COATED ORAL at 11:03

## 2024-03-25 RX ADMIN — FENTANYL CITRATE 50 MCG: 50 INJECTION, SOLUTION INTRAMUSCULAR; INTRAVENOUS at 10:03

## 2024-03-25 RX ADMIN — NITROGLYCERIN 0.4 MG: 0.4 TABLET SUBLINGUAL at 10:03

## 2024-03-25 RX ADMIN — METOPROLOL SUCCINATE 50 MG: 50 TABLET, EXTENDED RELEASE ORAL at 10:03

## 2024-03-25 RX ADMIN — ASPIRIN 325 MG: 325 TABLET, COATED ORAL at 11:03

## 2024-03-25 NOTE — Clinical Note
The catheter was inserted into the, was removed from the and was inserted over the wire into the proximal   right coronary artery.

## 2024-03-25 NOTE — HPI
Patient is a pleasant 78-year-old lady.  Follows with Dr. Kemp.  Came in with chest pain since yesterday.  Has a history of old inferior MI with RCA stent.  Denies orthopnea PND.  Still having ongoing chest pain, although states is getting slightly better.      1. Chest tightness and shortness of breath on exertion since January 2020  2. 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 Abnormal EKG  4. Coronary disease  5. Hypertension  6. Increased BMI  7. High cholesterol  8. History of fatty liver  9. History of GERD  10. History of diarrhea  11. History of elevated LFTs  12. History of degenerative disc disease cervical  13. Severe sleep apnea  14. Soft left carotid bruit  15. Dizziness  16. Diastolic heart failure

## 2024-03-25 NOTE — Clinical Note
The catheter was removed from the and was inserted over the wire into the left ventricle. Hemodynamics were performed.  and Pullback was recorded.

## 2024-03-25 NOTE — BRIEF OP NOTE
Successful PCI of RCA which was the culprit vessel.  Also has heavily calcified severe 95% stenosis in proximal and 80% lesion in mid circ.  Will stage PCI during this hospitalization.  Staging done to conserve dye.  Patient currently chest pain-free after PCI of RCA.  Continue aspirin Plavix statins.

## 2024-03-25 NOTE — Clinical Note
A pre-sedation assessment was completed by the physician immediately prior to sedation start. Emergency ASA and Mallampati

## 2024-03-25 NOTE — ED PROVIDER NOTES
Encounter Date: 3/25/2024    SCRIBE #1 NOTE: I, Anna Wei, am scribing for, and in the presence of,  Keegan Lucas Jr., MD. I have scribed the following portions of the note - Other sections scribed: HPI, ROS.       History     Chief Complaint   Patient presents with    Chest Pain     Chest pain began last night while sitting watching TV. Patient reports pain has been constant.      This 78 y.o female, with a medical history of AC (acromioclavicular) joint bone spurs, Coronary artery disease, Depression, Hyperlipidemia, Hypertension, and Osteoarthritis, presents to the ED c/o acute, mild (3/10) chest pain and palpitations that began yesterday afternoon, but worsened last night. Pt reports mainly experiencing pain to the right side of the chest and notes that it has been intermittent since onset. She adds that the pain radiates to her back. Pt states that the chest pain is currently improving, but is persisting. She denies any syncopal episodes, but notes that she presently feels as though she is going to pass out. Of note, pt reports a history of heart attacks with similar symptoms in the past. She also notes stent placement. Pt is followed by cardiology (Dr. Kemp). She last took Aspirin yesterday. No other blood thinner use. Pt denies nausea, emesis, sweats or any other associated symptoms.     The history is provided by the patient.     Review of patient's allergies indicates:   Allergen Reactions    Effexor [venlafaxine] Hives, Shortness Of Breath and Other (See Comments)     Dry mouth and chest pain    Bactrim [sulfamethoxazole-trimethoprim]     Codeine Itching and Other (See Comments)     Tightness in throat     Past Medical History:   Diagnosis Date    AC (acromioclavicular) joint bone spurs, unspecified laterality     Coronary artery disease     Depression     Hyperlipidemia     Hypertension     Osteoarthritis     Vaginal delivery     x2     Past Surgical History:   Procedure Laterality Date     ADENOIDECTOMY      BREAST SURGERY      biospy    HYSTERECTOMY      JOINT REPLACEMENT Bilateral     knee    stent-heart      TONSILLECTOMY      TOTAL KNEE ARTHROPLASTY       Family History   Problem Relation Age of Onset    Heart disease Mother     Heart disease Father      Social History     Tobacco Use    Smoking status: Never    Smokeless tobacco: Never   Substance Use Topics    Alcohol use: No    Drug use: No     Review of Systems   Constitutional:  Negative for diaphoresis and fever.   HENT:  Negative for sore throat.    Respiratory:  Negative for cough.    Cardiovascular:  Positive for chest pain (mainly to the right side; radiating to the back) and palpitations.   Gastrointestinal:  Negative for nausea and vomiting.   Genitourinary:  Negative for dysuria.   Musculoskeletal:  Positive for back pain.   Skin:  Negative for rash.   Neurological:  Negative for syncope and weakness.   All other systems reviewed and are negative.      Physical Exam     Initial Vitals   BP Pulse Resp Temp SpO2   03/25/24 1111 03/25/24 1102 03/25/24 1102 03/25/24 1102 03/25/24 1102   121/75 108 20 97.9 °F (36.6 °C) 99 %      MAP       --                Physical Exam    Nursing note and vitals reviewed.  Constitutional: She appears well-developed and well-nourished. She is not diaphoretic. No distress.   HENT:   Head: Normocephalic and atraumatic.   Right Ear: External ear normal.   Left Ear: External ear normal.   Nose: Nose normal.   Mouth/Throat: Oropharynx is clear and moist.   Eyes: Conjunctivae and EOM are normal. Pupils are equal, round, and reactive to light. Right eye exhibits no discharge. Left eye exhibits no discharge. No scleral icterus.   Neck: Neck supple.   Normal range of motion.  Cardiovascular:  Normal rate, regular rhythm, normal heart sounds and intact distal pulses.           No murmur heard.  Pulmonary/Chest: Breath sounds normal. No respiratory distress. She has no wheezes. She has no rhonchi. She has no rales.    Mild respiratory distress.  Speaking in complete sentences.  Clear lungs in all posterior fields.   Abdominal: Abdomen is soft. Bowel sounds are normal. She exhibits no distension and no mass. There is no abdominal tenderness. There is no rebound and no guarding.   Musculoskeletal:         General: No tenderness or edema. Normal range of motion.      Cervical back: Normal range of motion and neck supple.      Comments: Nonedematous lower extremities.     Neurological: She is alert and oriented to person, place, and time. She has normal strength. No cranial nerve deficit or sensory deficit.   Skin: Skin is warm and dry. No rash noted. No erythema. There is pallor.   Psychiatric: She has a normal mood and affect. Her behavior is normal. Judgment and thought content normal.         ED Course   Critical Care    Date/Time: 3/25/2024 12:58 PM    Performed by: Keegan Lucas Jr., MD  Authorized by: Vitaily Brown MD  Direct patient critical care time: 10 minutes  Additional history critical care time: 5 minutes  Ordering / reviewing critical care time: 5 minutes  Documentation critical care time: 10 minutes  Consulting other physicians critical care time: 5 minutes  Total critical care time (exclusive of procedural time) : 35 minutes  Critical care was necessary to treat or prevent imminent or life-threatening deterioration of the following conditions: cardiac failure.  Critical care was time spent personally by me on the following activities: development of treatment plan with patient or surrogate, discussions with consultants, interpretation of cardiac output measurements, evaluation of patient's response to treatment, examination of patient, obtaining history from patient or surrogate, review of old charts, re-evaluation of patient's condition, pulse oximetry, ordering and review of radiographic studies, ordering and review of laboratory studies and ordering and performing treatments and  interventions.        Labs Reviewed   CBC W/ AUTO DIFFERENTIAL - Abnormal; Notable for the following components:       Result Value    RBC 5.57 (*)     All other components within normal limits   TYPE & SCREEN     EKG Readings: (Independently Interpreted)   Initial Reading: STEMI.   EKG reviewed and interpreted by me shows sinus tachycardia rate of 106 beats per minute.  The [place], QRS intervals are normal.  QTC is 467 milliseconds.  There is a left axis deviation.  There is poor R-wave progression across the precordium.  There are ST elevations in the inferior leads as well as AVR and V1.  There are ST depressions in the high lateral leads.         Imaging Results              X-Ray Chest AP Portable (No Result on File)                      Medications   LIDOcaine (PF) 10 mg/ml (1%) injection (20 mLs Subcutaneous Given 3/25/24 1143)   fentaNYL 50 mcg/mL injection (50 mcg Intravenous Given 3/25/24 1242)   midazolam injection (0.5 mg Intravenous Given 3/25/24 1144)   heparin (porcine) injection (1,000 Units Intravenous Given 3/25/24 1155)   iohexoL (OMNIPAQUE 350) injection (80 mLs Intra-arterial Given 3/25/24 1243)   aspirin chewable tablet 81 mg (has no administration in time range)   clopidogreL tablet 75 mg (has no administration in time range)   acetaminophen tablet 650 mg (has no administration in time range)   ondansetron injection 4 mg (has no administration in time range)   clopidogreL tablet 600 mg (600 mg Oral Given 3/25/24 1117)   aspirin EC tablet 325 mg (325 mg Oral Given 3/25/24 1116)     Medical Decision Making  This is the emergent evaluation of a 78-year-old female who presents emergency department for evaluation chest pain and shortness of breath since yesterday.  Differential diagnosis at the time of initial evaluation included, but was not limited to: Acute myocardial infarction, acute coronary syndrome, pericarditis, myocarditis, pneumonia, pneumothorax, gastroesophageal reflux disease, pleurisy,  costochondritis.  I considered but doubt pulmonary embolus and aortic dissection.  EKG concerning for inferior STEMI.  Case discussed with Dr. May after code STEMI activated.  He recommends 600 Plavix bolus in a slurry for quicker absorption.  325 mg aspirin.  Have ordered patient's labs and chest x-ray.       Dr. Brown has seen the patient.  He is taking the patient to the cath lab.    No leukocytosis or clinically significant anemia.  No significant metabolic derangement or MILTON.  Troponin 0.12.  .  ICU hospitalist is aware of the patient and will consult on the patient after cardiac catheterization procedure.    Amount and/or Complexity of Data Reviewed  Labs: ordered. Decision-making details documented in ED Course.  Radiology: ordered and independent interpretation performed. Decision-making details documented in ED Course.  ECG/medicine tests: ordered and independent interpretation performed. Decision-making details documented in ED Course.    Risk  OTC drugs.  Prescription drug management.            Scribe Attestation:   Scribe #1: I performed the above scribed service and the documentation accurately describes the services I performed. I attest to the accuracy of the note.                         I, Keegan Lucas MD, personally performed the services described in this documentation. All medical record entries made by the scribe were at my direction and in my presence. I have reviewed the chart and agree that the record reflects my personal performance and is accurate and complete.       Clinical Impression:  Final diagnoses:  [R07.9] Chest pain  [I21.3] ST elevation myocardial infarction (STEMI), unspecified artery (Primary)  [R06.02] SOB (shortness of breath)                 Keegan Lucas Jr., MD  03/25/24 3924       Keegan Lucas Jr., MD  03/25/24 0902

## 2024-03-25 NOTE — ASSESSMENT & PLAN NOTE
Patient with known CAD s/p stent placement, which is uncontrolled Will continue ASA, Plavix, and Statin and monitor for S/Sx of angina/ACS.

## 2024-03-25 NOTE — ASSESSMENT & PLAN NOTE
Cath lab activated for inferior STEMI.  Aspirin Plavix taking emergently to cardiac catheterization lab    Risks, benefits and alternatives of the catheterization procedure were discussed with the patient.The risks of coronary angiography include but are not limited to: bleeding, infection, death, heart attack, arrhythmia, kidney injury or failure, potential need for dialysis, allergic reactions, stroke, need for emergency surgery, hematoma, pseudoaneurysm etc.  Should stenting be indicated, the patient has agreed to dual anti-platelet therapy for 1-consecutive year with a drug-eluting stent and a minimum of 1-month with the use of a bare metal stent. Additionally, pt is aware that non-compliance is likely to result in stent clotting with heart attack, heart failure, and/or death  The risks of moderate sedation include hypotension, respiratory depression, arrhythmias, bronchospasm, and death. Informed consent was obtained and the  patient is agreeable to proceed with the procedure. Consent was placed on the chart.

## 2024-03-25 NOTE — Clinical Note
The catheter was inserted into the and was inserted over the wire into the distal   circumflex. IVUS performed, catheter removed

## 2024-03-25 NOTE — ED TRIAGE NOTES
Pt presents to ED co of CP starting late yesterday evening, no releif. Pt denies any SOB, N/V/D/C. Pt anxious, states she has had stents placed before, takes baby aspirin daily. EKG obtained in triage showed STEMI, opt immediately brought to RM 17 for further eval. 2 Ivs obtained, bloodwork sent, pt shaved and prep for cath lab.

## 2024-03-25 NOTE — CONSULTS
[facility] - Emergency Dept  Cardiology  Consult Note    Patient Name: Elizabeth Ayala  MRN: [id number]  Admission Date: 3/25/2024  Hospital Length of Stay: 0 days  Code Status: Prior   Attending Provider: patient and ER records  Consulting Provider: Vitaliy Brown MD  Primary Care Physician: Amna Levy MD  Principal Problem:<principal problem not specified>    Patient information was obtained from patient and ER records.     Consults  Subjective:     Chief Complaint:  stemi     HPI:   Patient is a pleasant 78-year-old lady.  Follows with Dr. Kemp.  Came in with chest pain since yesterday.  Has a history of old inferior MI with RCA stent.  Denies orthopnea PND.  Still having ongoing chest pain, although states is getting slightly better.      1. Chest tightness and shortness of breath on exertion since January 2020  2. 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 Abnormal EKG  4. Coronary disease  5. Hypertension  6. Increased BMI  7. High cholesterol  8. History of fatty liver  9. History of GERD  10. History of diarrhea  11. History of elevated LFTs  12. History of degenerative disc disease cervical  13. Severe sleep apnea  14. Soft left carotid bruit  15. Dizziness  16. Diastolic heart failure    Past Medical History:   Diagnosis Date    AC (acromioclavicular) joint bone spurs, unspecified laterality     Coronary artery disease     Depression     Hyperlipidemia     Hypertension     Osteoarthritis     Vaginal delivery     x2       Past Surgical History:   Procedure Laterality Date    ADENOIDECTOMY      BREAST SURGERY      biospy    HYSTERECTOMY      JOINT REPLACEMENT Bilateral     knee    stent-heart      TONSILLECTOMY      TOTAL KNEE ARTHROPLASTY         Review of patient's allergies indicates:   Allergen Reactions    Effexor [venlafaxine] Hives, Shortness Of Breath and Other (See Comments)     Dry mouth and chest pain    Bactrim [sulfamethoxazole-trimethoprim]     Codeine Itching and Other (See Comments)      Tightness in throat       No current facility-administered medications on file prior to encounter.     Current Outpatient Medications on File Prior to Encounter   Medication Sig    amLODIPine (NORVASC) 2.5 MG tablet     aspirin (ECOTRIN) 81 MG EC tablet Take 81 mg by mouth once daily.      atenolol (TENORMIN) 50 MG tablet Take 50 mg by mouth once daily.    atorvastatin (LIPITOR) 80 MG tablet     biotin 1 mg Cap Take 1 capsule by mouth once daily.    clonazePAM (KLONOPIN) 1 MG tablet Take 1 tablet (1 mg total) by mouth 2 (two) times daily.    docusate sodium (COLACE) 100 MG capsule Take 1 capsule (100 mg total) by mouth 2 (two) times daily. Use while taking Norco to prevent constipation    hydrochlorothiazide (HYDRODIURIL) 25 MG tablet Take 1 tablet (25 mg total) by mouth once daily.    HYDROcodone-acetaminophen (NORCO) 5-325 mg per tablet Take 1 tablet by mouth every 8 (eight) hours as needed (Severe pain not improved by other medications).    ibuprofen (ADVIL,MOTRIN) 100 MG tablet Take 600 mg by mouth daily as needed for Temperature greater than.    ibuprofen (ADVIL,MOTRIN) 600 MG tablet Take 1 tablet (600 mg total) by mouth every 6 (six) hours as needed for Pain or Temperature greater than (100.5). Take with food to prevent upset stomach    losartan (COZAAR) 25 MG tablet 25 mg once daily.     multivitamin (THERAGRAN) per tablet Take 1 tablet by mouth once daily.    OM-3/DHA/EPA/FISH OIL/VIT D3 (FISH OIL-VIT D3 ORAL) Take by mouth.      ondansetron (ZOFRAN) 4 MG tablet Take 1 tablet (4 mg total) by mouth every 6 (six) hours.    sertraline (ZOLOFT) 100 MG tablet TAKE 1.5 TABLETS (150 MG TOTAL) BY MOUTH DAILY     Family History       Problem Relation (Age of Onset)    Heart disease Mother, Father          Tobacco Use    Smoking status: Never    Smokeless tobacco: Never   Substance and Sexual Activity    Alcohol use: No    Drug use: No    Sexual activity: Yes     Partners: Male     Review of Systems   Constitutional:  Negative.   HENT: Negative.     Eyes: Negative.    Cardiovascular:  Positive for chest pain.   Respiratory: Negative.     Endocrine: Negative.    Hematologic/Lymphatic: Negative.    Skin: Negative.    Musculoskeletal: Negative.    Gastrointestinal: Negative.    Genitourinary: Negative.    Neurological: Negative.    Psychiatric/Behavioral: Negative.     Allergic/Immunologic: Negative.      Objective:     Vital Signs (Most Recent):  Temp: 97.9 °F (36.6 °C) (03/25/24 1102)  Pulse: 97 (03/25/24 1111)  Resp: 20 (03/25/24 1102)  BP: 121/75 (03/25/24 1111)  SpO2: 98 % (03/25/24 1111) Vital Signs (24h Range):  Temp:  [97.9 °F (36.6 °C)] 97.9 °F (36.6 °C)  Pulse:  [] 97  Resp:  [20] 20  SpO2:  [98 %-99 %] 98 %  BP: (121)/(75) 121/75     Weight: 67.6 kg (149 lb)  Body mass index is 29.1 kg/m².    SpO2: 98 %       No intake or output data in the 24 hours ending 03/25/24 1127    Lines/Drains/Airways       Peripheral Intravenous Line  Duration                  Peripheral IV - Single Lumen 03/25/24 1111 20 G Right Antecubital <1 day         Peripheral IV - Single Lumen 03/25/24 1114 20 G Left Antecubital <1 day                     Physical Exam  Constitutional:       Appearance: Normal appearance. She is well-developed.   HENT:      Head: Normocephalic.   Eyes:      Pupils: Pupils are equal, round, and reactive to light.   Cardiovascular:      Rate and Rhythm: Normal rate and regular rhythm.   Pulmonary:      Effort: Pulmonary effort is normal.      Breath sounds: Normal breath sounds.   Abdominal:      General: Bowel sounds are normal.      Palpations: Abdomen is soft.      Tenderness: There is no abdominal tenderness.   Musculoskeletal:         General: Normal range of motion.      Cervical back: Normal range of motion and neck supple.   Skin:     General: Skin is warm.   Neurological:      Mental Status: She is alert and oriented to person, place, and time.          Significant Labs:     DATA:      Laboratory:  CBC:  Recent Labs   Lab 03/25/24  1109   WBC 9.50   Hemoglobin 16.0   Hematocrit 46.0   Platelets 306       CHEMISTRIES:  Recent Labs   Lab 01/26/23  1413 04/06/23  1021 05/17/23  1258   Sodium 136 138 141   Potassium 4.3 3.7 3.9   BUN 17.9 16.7 20.1 H   Creatinine 0.77 0.76 0.70   eGFR  78 L 80 L 88 L   Calcium 9.6 9.1 8.8       CARDIAC BIOMARKERS:        COAGS:        LIPIDS/LFTS:  Recent Labs   Lab 01/26/23  1413   AST 24   ALT 25       Hemoglobin A1C   Date Value Ref Range Status   12/11/2007 5.5 4.5 - 6.2 % Final     Hemoglobin A1c   Date Value Ref Range Status   03/25/2021 4.9 <5.7 % of total Hgb Final     Comment:     For the purpose of screening for the presence of  diabetes:    <5.7%       Consistent with the absence of diabetes  5.7-6.4%    Consistent with increased risk for diabetes              (prediabetes)  > or =6.5%  Consistent with diabetes    This assay result is consistent with a decreased risk  of diabetes.    Currently, no consensus exists regarding use of  hemoglobin A1c for diagnosis of diabetes in children.    According to American Diabetes Association (ADA)  guidelines, hemoglobin A1c <7.0% represents optimal  control in non-pregnant diabetic patients. Different  metrics may apply to specific patient populations.   Standards of Medical Care in Diabetes(ADA).           TSH        The ASCVD Risk score (Zack DK, et al., 2019) failed to calculate for the following reasons:    The 2019 ASCVD risk score is only valid for ages 40 to 79    The patient has a prior MI or stroke diagnosis       BNP    Lab Results   Component Value Date/Time    BNP 85 04/29/2015 03:31 PM            ECHO    No results found for this or any previous visit.      STRESS TEST    No results found for this or any previous visit.        CATH    No results found for this or any previous visit.      Assessment and Plan:     STEMI (ST elevation myocardial infarction)  Cath lab activated for  inferior STEMI.  Aspirin Plavix taking emergently to cardiac catheterization lab    Risks, benefits and alternatives of the catheterization procedure were discussed with the patient.The risks of coronary angiography include but are not limited to: bleeding, infection, death, heart attack, arrhythmia, kidney injury or failure, potential need for dialysis, allergic reactions, stroke, need for emergency surgery, hematoma, pseudoaneurysm etc.  Should stenting be indicated, the patient has agreed to dual anti-platelet therapy for 1-consecutive year with a drug-eluting stent and a minimum of 1-month with the use of a bare metal stent. Additionally, pt is aware that non-compliance is likely to result in stent clotting with heart attack, heart failure, and/or death  The risks of moderate sedation include hypotension, respiratory depression, arrhythmias, bronchospasm, and death. Informed consent was obtained and the  patient is agreeable to proceed with the procedure. Consent was placed on the chart.      CAD (coronary artery disease)  Patient with known CAD s/p stent placement, which is uncontrolled Will continue ASA, Plavix, and Statin and monitor for S/Sx of angina/ACS.         VTE Risk Mitigation (From admission, onward)      None            Thank you for your consult. I will follow-up with patient. Please contact us if you have any additional questions.    Vitaliy Brown MD  Cardiology   [facility] - Emergency Dept      Critical Care Time: 45 minutes     Critical care was time spent personally by me on the following activities: development of treatment plan with patient or surrogate and bedside caregivers, discussions with consultants, evaluation of patient's response to treatment, examination of patient, ordering and performing treatments and interventions, ordering and review of laboratory studies, ordering and review of radiographic studies, pulse oximetry, re-evaluation of patient's condition. This critical care time  did not overlap with that of any other provider or involve time for any procedures.

## 2024-03-25 NOTE — ADMISSIONCARE
AdmissionCare    Guideline: Myocardial Infarction, Inpatient    Based on the indications selected for the patient, the bed status of Inpatient was determined to be MET    The following indications were selected as present at the time of evaluation of the patient:      - Acute myocardial infarction (MI) (not in context of cardiac procedure within last 48 hours), as indicated by ALL of the following:    - Elevated cardiac troponin level, as indicated by 1 or more of the following:     - New or presumed new elevation of cardiac troponin level (ie, elevation clearly not due to chronic condition)    - Myocardial injury due to acute ischemia, as indicated by 1 or more of the following:     - Symptoms consistent with myocardial ischemia (eg, chest pain, dyspnea)     - New or presumed new ECG changes consistent with ischemia (eg, ST-segment change, left bundle branch block)    AdmissionCare documentation entered by: Colette Butler    [facility] [employer], 27th edition, Copyright © 2023 [facility] [employer], [facility] All Rights Reserved.  [id number]

## 2024-03-25 NOTE — SUBJECTIVE & OBJECTIVE
Past Medical History:   Diagnosis Date    AC (acromioclavicular) joint bone spurs, unspecified laterality     Coronary artery disease     Depression     Hyperlipidemia     Hypertension     Osteoarthritis     Vaginal delivery     x2       Past Surgical History:   Procedure Laterality Date    ADENOIDECTOMY      BREAST SURGERY      biospy    HYSTERECTOMY      JOINT REPLACEMENT Bilateral     knee    stent-heart      TONSILLECTOMY      TOTAL KNEE ARTHROPLASTY         Review of patient's allergies indicates:   Allergen Reactions    Effexor [venlafaxine] Hives, Shortness Of Breath and Other (See Comments)     Dry mouth and chest pain    Bactrim [sulfamethoxazole-trimethoprim]     Codeine Itching and Other (See Comments)     Tightness in throat       No current facility-administered medications on file prior to encounter.     Current Outpatient Medications on File Prior to Encounter   Medication Sig    amLODIPine (NORVASC) 2.5 MG tablet     aspirin (ECOTRIN) 81 MG EC tablet Take 81 mg by mouth once daily.      atenolol (TENORMIN) 50 MG tablet Take 50 mg by mouth once daily.    atorvastatin (LIPITOR) 80 MG tablet     biotin 1 mg Cap Take 1 capsule by mouth once daily.    clonazePAM (KLONOPIN) 1 MG tablet Take 1 tablet (1 mg total) by mouth 2 (two) times daily.    docusate sodium (COLACE) 100 MG capsule Take 1 capsule (100 mg total) by mouth 2 (two) times daily. Use while taking Norco to prevent constipation    hydrochlorothiazide (HYDRODIURIL) 25 MG tablet Take 1 tablet (25 mg total) by mouth once daily.    HYDROcodone-acetaminophen (NORCO) 5-325 mg per tablet Take 1 tablet by mouth every 8 (eight) hours as needed (Severe pain not improved by other medications).    ibuprofen (ADVIL,MOTRIN) 100 MG tablet Take 600 mg by mouth daily as needed for Temperature greater than.    ibuprofen (ADVIL,MOTRIN) 600 MG tablet Take 1 tablet (600 mg total) by mouth every 6 (six) hours as needed for Pain or Temperature greater than (100.5).  Take with food to prevent upset stomach    losartan (COZAAR) 25 MG tablet 25 mg once daily.     multivitamin (THERAGRAN) per tablet Take 1 tablet by mouth once daily.    OM-3/DHA/EPA/FISH OIL/VIT D3 (FISH OIL-VIT D3 ORAL) Take by mouth.      ondansetron (ZOFRAN) 4 MG tablet Take 1 tablet (4 mg total) by mouth every 6 (six) hours.    sertraline (ZOLOFT) 100 MG tablet TAKE 1.5 TABLETS (150 MG TOTAL) BY MOUTH DAILY     Family History       Problem Relation (Age of Onset)    Heart disease Mother, Father          Tobacco Use    Smoking status: Never    Smokeless tobacco: Never   Substance and Sexual Activity    Alcohol use: No    Drug use: No    Sexual activity: Yes     Partners: Male     Review of Systems   Constitutional: Negative.   HENT: Negative.     Eyes: Negative.    Cardiovascular:  Positive for chest pain.   Respiratory: Negative.     Endocrine: Negative.    Hematologic/Lymphatic: Negative.    Skin: Negative.    Musculoskeletal: Negative.    Gastrointestinal: Negative.    Genitourinary: Negative.    Neurological: Negative.    Psychiatric/Behavioral: Negative.     Allergic/Immunologic: Negative.      Objective:     Vital Signs (Most Recent):  Temp: 97.9 °F (36.6 °C) (03/25/24 1102)  Pulse: 97 (03/25/24 1111)  Resp: 20 (03/25/24 1102)  BP: 121/75 (03/25/24 1111)  SpO2: 98 % (03/25/24 1111) Vital Signs (24h Range):  Temp:  [97.9 °F (36.6 °C)] 97.9 °F (36.6 °C)  Pulse:  [] 97  Resp:  [20] 20  SpO2:  [98 %-99 %] 98 %  BP: (121)/(75) 121/75     Weight: 67.6 kg (149 lb)  Body mass index is 29.1 kg/m².    SpO2: 98 %       No intake or output data in the 24 hours ending 03/25/24 1127    Lines/Drains/Airways       Peripheral Intravenous Line  Duration                  Peripheral IV - Single Lumen 03/25/24 1111 20 G Right Antecubital <1 day         Peripheral IV - Single Lumen 03/25/24 1114 20 G Left Antecubital <1 day                     Physical Exam  Constitutional:       Appearance: Normal appearance. She is  well-developed.   HENT:      Head: Normocephalic.   Eyes:      Pupils: Pupils are equal, round, and reactive to light.   Cardiovascular:      Rate and Rhythm: Normal rate and regular rhythm.   Pulmonary:      Effort: Pulmonary effort is normal.      Breath sounds: Normal breath sounds.   Abdominal:      General: Bowel sounds are normal.      Palpations: Abdomen is soft.      Tenderness: There is no abdominal tenderness.   Musculoskeletal:         General: Normal range of motion.      Cervical back: Normal range of motion and neck supple.   Skin:     General: Skin is warm.   Neurological:      Mental Status: She is alert and oriented to person, place, and time.          Significant Labs:     DATA:     Laboratory:  CBC:  Recent Labs   Lab 03/25/24  1109   WBC 9.50   Hemoglobin 16.0   Hematocrit 46.0   Platelets 306       CHEMISTRIES:  Recent Labs   Lab 01/26/23  1413 04/06/23  1021 05/17/23  1258   Sodium 136 138 141   Potassium 4.3 3.7 3.9   BUN 17.9 16.7 20.1 H   Creatinine 0.77 0.76 0.70   eGFR  78 L 80 L 88 L   Calcium 9.6 9.1 8.8       CARDIAC BIOMARKERS:        COAGS:        LIPIDS/LFTS:  Recent Labs   Lab 01/26/23  1413   AST 24   ALT 25       Hemoglobin A1C   Date Value Ref Range Status   12/11/2007 5.5 4.5 - 6.2 % Final     Hemoglobin A1c   Date Value Ref Range Status   03/25/2021 4.9 <5.7 % of total Hgb Final     Comment:     For the purpose of screening for the presence of  diabetes:    <5.7%       Consistent with the absence of diabetes  5.7-6.4%    Consistent with increased risk for diabetes              (prediabetes)  > or =6.5%  Consistent with diabetes    This assay result is consistent with a decreased risk  of diabetes.    Currently, no consensus exists regarding use of  hemoglobin A1c for diagnosis of diabetes in children.    According to American Diabetes Association (ADA)  guidelines, hemoglobin A1c <7.0% represents optimal  control in non-pregnant diabetic patients.  Different  metrics may apply to specific patient populations.   Standards of Medical Care in Diabetes(ADA).           TSH        The ASCVD Risk score (Zack VALENCIA, et al., 2019) failed to calculate for the following reasons:    The 2019 ASCVD risk score is only valid for ages 40 to 79    The patient has a prior MI or stroke diagnosis       BNP    Lab Results   Component Value Date/Time    BNP 85 04/29/2015 03:31 PM            ECHO    No results found for this or any previous visit.      STRESS TEST    No results found for this or any previous visit.        CATH    No results found for this or any previous visit.

## 2024-03-25 NOTE — PLAN OF CARE
Patient received from cath lab @ 13:25 pm and remain in ICU, AAOX4, VSS, on room air, free of fall, injury, and skin breakdown in a shift. POC reviewed with patient expressed understanding.  Problem: Adult Inpatient Plan of Care  Goal: Plan of Care Review  Outcome: Ongoing, Progressing  Goal: Patient-Specific Goal (Individualized)  Outcome: Ongoing, Progressing  Goal: Absence of Hospital-Acquired Illness or Injury  Outcome: Ongoing, Progressing  Goal: Optimal Comfort and Wellbeing  Outcome: Ongoing, Progressing  Goal: Readiness for Transition of Care  Outcome: Ongoing, Progressing     Problem: Skin Injury Risk Increased  Goal: Skin Health and Integrity  Outcome: Ongoing, Progressing

## 2024-03-26 PROBLEM — I24.1 DRESSLER'S SYNDROME: Status: ACTIVE | Noted: 2024-03-26

## 2024-03-26 LAB
ALBUMIN SERPL BCP-MCNC: 3.7 G/DL (ref 3.5–5.2)
ALP SERPL-CCNC: 91 U/L (ref 55–135)
ALT SERPL W/O P-5'-P-CCNC: 14 U/L (ref 10–44)
ANION GAP SERPL CALC-SCNC: 10 MMOL/L (ref 8–16)
APTT PPP: 31.7 SEC (ref 21–32)
AST SERPL-CCNC: 14 U/L (ref 10–40)
BASOPHILS # BLD AUTO: 0.02 K/UL (ref 0–0.2)
BASOPHILS # BLD AUTO: 0.03 K/UL (ref 0–0.2)
BASOPHILS NFR BLD: 0.2 % (ref 0–1.9)
BASOPHILS NFR BLD: 0.3 % (ref 0–1.9)
BILIRUB SERPL-MCNC: 0.7 MG/DL (ref 0.1–1)
BUN SERPL-MCNC: 14 MG/DL (ref 8–23)
CALCIUM SERPL-MCNC: 9.7 MG/DL (ref 8.7–10.5)
CHLORIDE SERPL-SCNC: 102 MMOL/L (ref 95–110)
CO2 SERPL-SCNC: 28 MMOL/L (ref 23–29)
CREAT SERPL-MCNC: 0.9 MG/DL (ref 0.5–1.4)
DIFFERENTIAL METHOD BLD: ABNORMAL
DIFFERENTIAL METHOD BLD: NORMAL
EOSINOPHIL # BLD AUTO: 0.2 K/UL (ref 0–0.5)
EOSINOPHIL # BLD AUTO: 0.2 K/UL (ref 0–0.5)
EOSINOPHIL NFR BLD: 2.1 % (ref 0–8)
EOSINOPHIL NFR BLD: 2.1 % (ref 0–8)
ERYTHROCYTE [DISTWIDTH] IN BLOOD BY AUTOMATED COUNT: 12.8 % (ref 11.5–14.5)
ERYTHROCYTE [DISTWIDTH] IN BLOOD BY AUTOMATED COUNT: 13 % (ref 11.5–14.5)
EST. GFR  (NO RACE VARIABLE): >60 ML/MIN/1.73 M^2
GLUCOSE SERPL-MCNC: 105 MG/DL (ref 70–110)
HCT VFR BLD AUTO: 39.2 % (ref 37–48.5)
HCT VFR BLD AUTO: 42.5 % (ref 37–48.5)
HGB BLD-MCNC: 13.3 G/DL (ref 12–16)
HGB BLD-MCNC: 14.5 G/DL (ref 12–16)
IMM GRANULOCYTES # BLD AUTO: 0.03 K/UL (ref 0–0.04)
IMM GRANULOCYTES # BLD AUTO: 0.04 K/UL (ref 0–0.04)
IMM GRANULOCYTES NFR BLD AUTO: 0.3 % (ref 0–0.5)
IMM GRANULOCYTES NFR BLD AUTO: 0.4 % (ref 0–0.5)
INR PPP: 1 (ref 0.8–1.2)
LYMPHOCYTES # BLD AUTO: 2 K/UL (ref 1–4.8)
LYMPHOCYTES # BLD AUTO: 2.2 K/UL (ref 1–4.8)
LYMPHOCYTES NFR BLD: 20.3 % (ref 18–48)
LYMPHOCYTES NFR BLD: 22.2 % (ref 18–48)
MCH RBC QN AUTO: 28.1 PG (ref 27–31)
MCH RBC QN AUTO: 28.9 PG (ref 27–31)
MCHC RBC AUTO-ENTMCNC: 33.9 G/DL (ref 32–36)
MCHC RBC AUTO-ENTMCNC: 34.1 G/DL (ref 32–36)
MCV RBC AUTO: 83 FL (ref 82–98)
MCV RBC AUTO: 85 FL (ref 82–98)
MONOCYTES # BLD AUTO: 1 K/UL (ref 0.3–1)
MONOCYTES # BLD AUTO: 1.1 K/UL (ref 0.3–1)
MONOCYTES NFR BLD: 10.3 % (ref 4–15)
MONOCYTES NFR BLD: 11.1 % (ref 4–15)
NEUTROPHILS # BLD AUTO: 6.4 K/UL (ref 1.8–7.7)
NEUTROPHILS # BLD AUTO: 6.5 K/UL (ref 1.8–7.7)
NEUTROPHILS NFR BLD: 64.8 % (ref 38–73)
NEUTROPHILS NFR BLD: 65.9 % (ref 38–73)
NRBC BLD-RTO: 0 /100 WBC
NRBC BLD-RTO: 0 /100 WBC
PLATELET # BLD AUTO: 253 K/UL (ref 150–450)
PLATELET # BLD AUTO: 264 K/UL (ref 150–450)
PMV BLD AUTO: 9.3 FL (ref 9.2–12.9)
PMV BLD AUTO: 9.4 FL (ref 9.2–12.9)
POCT GLUCOSE: 119 MG/DL (ref 70–110)
POCT GLUCOSE: 132 MG/DL (ref 70–110)
POCT GLUCOSE: 96 MG/DL (ref 70–110)
POTASSIUM SERPL-SCNC: 3.9 MMOL/L (ref 3.5–5.1)
PROT SERPL-MCNC: 6.3 G/DL (ref 6–8.4)
PROTHROMBIN TIME: 11 SEC (ref 9–12.5)
RBC # BLD AUTO: 4.74 M/UL (ref 4–5.4)
RBC # BLD AUTO: 5.01 M/UL (ref 4–5.4)
SODIUM SERPL-SCNC: 140 MMOL/L (ref 136–145)
TROPONIN I SERPL DL<=0.01 NG/ML-MCNC: 0.23 NG/ML (ref 0–0.03)
TROPONIN I SERPL DL<=0.01 NG/ML-MCNC: 0.24 NG/ML (ref 0–0.03)
WBC # BLD AUTO: 9.73 K/UL (ref 3.9–12.7)
WBC # BLD AUTO: 9.98 K/UL (ref 3.9–12.7)

## 2024-03-26 PROCEDURE — 93005 ELECTROCARDIOGRAM TRACING: CPT

## 2024-03-26 PROCEDURE — 93010 ELECTROCARDIOGRAM REPORT: CPT | Mod: ,,, | Performed by: INTERNAL MEDICINE

## 2024-03-26 PROCEDURE — 99292 CRITICAL CARE ADDL 30 MIN: CPT | Mod: 25,ICN,, | Performed by: INTERNAL MEDICINE

## 2024-03-26 PROCEDURE — 63600175 PHARM REV CODE 636 W HCPCS: Performed by: NURSE PRACTITIONER

## 2024-03-26 PROCEDURE — 99291 CRITICAL CARE FIRST HOUR: CPT | Mod: 25,ICN,, | Performed by: INTERNAL MEDICINE

## 2024-03-26 PROCEDURE — 20000000 HC ICU ROOM

## 2024-03-26 PROCEDURE — 94761 N-INVAS EAR/PLS OXIMETRY MLT: CPT

## 2024-03-26 PROCEDURE — 85025 COMPLETE CBC W/AUTO DIFF WBC: CPT | Performed by: INTERNAL MEDICINE

## 2024-03-26 PROCEDURE — 63600175 PHARM REV CODE 636 W HCPCS: Mod: JZ,JG | Performed by: INTERNAL MEDICINE

## 2024-03-26 PROCEDURE — 36415 COLL VENOUS BLD VENIPUNCTURE: CPT | Performed by: INTERNAL MEDICINE

## 2024-03-26 PROCEDURE — 85730 THROMBOPLASTIN TIME PARTIAL: CPT | Performed by: INTERNAL MEDICINE

## 2024-03-26 PROCEDURE — 80053 COMPREHEN METABOLIC PANEL: CPT | Performed by: INTERNAL MEDICINE

## 2024-03-26 PROCEDURE — 84484 ASSAY OF TROPONIN QUANT: CPT | Performed by: INTERNAL MEDICINE

## 2024-03-26 PROCEDURE — 85610 PROTHROMBIN TIME: CPT | Performed by: INTERNAL MEDICINE

## 2024-03-26 PROCEDURE — 93010 ELECTROCARDIOGRAM REPORT: CPT | Mod: 76,,, | Performed by: INTERNAL MEDICINE

## 2024-03-26 PROCEDURE — 25000003 PHARM REV CODE 250: Performed by: INTERNAL MEDICINE

## 2024-03-26 RX ORDER — ENOXAPARIN SODIUM 100 MG/ML
40 INJECTION SUBCUTANEOUS EVERY 24 HOURS
Status: DISCONTINUED | OUTPATIENT
Start: 2024-03-26 | End: 2024-03-26

## 2024-03-26 RX ORDER — NITROGLYCERIN 20 MG/100ML
INJECTION INTRAVENOUS
Status: COMPLETED
Start: 2024-03-26 | End: 2024-03-26

## 2024-03-26 RX ORDER — MIDAZOLAM HYDROCHLORIDE 2 MG/2ML
1 INJECTION, SOLUTION INTRAMUSCULAR; INTRAVENOUS
Status: DISCONTINUED | OUTPATIENT
Start: 2024-03-26 | End: 2024-03-28 | Stop reason: HOSPADM

## 2024-03-26 RX ORDER — COLCHICINE 0.6 MG/1
0.6 TABLET, FILM COATED ORAL DAILY
Status: DISCONTINUED | OUTPATIENT
Start: 2024-03-27 | End: 2024-03-28 | Stop reason: HOSPADM

## 2024-03-26 RX ORDER — HEPARIN SODIUM,PORCINE/D5W 25000/250
0-40 INTRAVENOUS SOLUTION INTRAVENOUS CONTINUOUS
Status: DISCONTINUED | OUTPATIENT
Start: 2024-03-26 | End: 2024-03-27

## 2024-03-26 RX ORDER — NITROGLYCERIN 20 MG/100ML
0-400 INJECTION INTRAVENOUS CONTINUOUS
Status: DISCONTINUED | OUTPATIENT
Start: 2024-03-26 | End: 2024-03-27

## 2024-03-26 RX ORDER — MIDAZOLAM HYDROCHLORIDE 2 MG/2ML
1 INJECTION, SOLUTION INTRAMUSCULAR; INTRAVENOUS
Status: DISCONTINUED | OUTPATIENT
Start: 2024-03-26 | End: 2024-03-26

## 2024-03-26 RX ORDER — COLCHICINE 0.6 MG/1
1.2 TABLET, FILM COATED ORAL ONCE
Status: COMPLETED | OUTPATIENT
Start: 2024-03-26 | End: 2024-03-26

## 2024-03-26 RX ORDER — AMLODIPINE BESYLATE 2.5 MG/1
2.5 TABLET ORAL DAILY
Status: DISCONTINUED | OUTPATIENT
Start: 2024-03-26 | End: 2024-03-27

## 2024-03-26 RX ORDER — MUPIROCIN 20 MG/G
OINTMENT TOPICAL 2 TIMES DAILY
Status: DISCONTINUED | OUTPATIENT
Start: 2024-03-26 | End: 2024-03-28 | Stop reason: HOSPADM

## 2024-03-26 RX ADMIN — MUPIROCIN: 20 OINTMENT TOPICAL at 08:03

## 2024-03-26 RX ADMIN — NITROGLYCERIN 0.4 MG: 0.4 TABLET SUBLINGUAL at 06:03

## 2024-03-26 RX ADMIN — FENTANYL CITRATE 50 MCG: 50 INJECTION, SOLUTION INTRAMUSCULAR; INTRAVENOUS at 07:03

## 2024-03-26 RX ADMIN — CLOPIDOGREL BISULFATE 75 MG: 75 TABLET ORAL at 08:03

## 2024-03-26 RX ADMIN — ATORVASTATIN CALCIUM 80 MG: 40 TABLET, FILM COATED ORAL at 08:03

## 2024-03-26 RX ADMIN — NITROGLYCERIN 0.4 MG: 0.4 TABLET SUBLINGUAL at 05:03

## 2024-03-26 RX ADMIN — HEPARIN SODIUM 12 UNITS/KG/HR: 10000 INJECTION, SOLUTION INTRAVENOUS at 08:03

## 2024-03-26 RX ADMIN — COLCHICINE 1.2 MG: 0.6 TABLET, FILM COATED ORAL at 07:03

## 2024-03-26 RX ADMIN — METOPROLOL SUCCINATE 50 MG: 50 TABLET, EXTENDED RELEASE ORAL at 08:03

## 2024-03-26 RX ADMIN — NITROGLYCERIN 10 MCG/MIN: 20 INJECTION INTRAVENOUS at 07:03

## 2024-03-26 RX ADMIN — AMLODIPINE BESYLATE 2.5 MG: 2.5 TABLET ORAL at 02:03

## 2024-03-26 RX ADMIN — ISOSORBIDE MONONITRATE 30 MG: 30 TABLET, EXTENDED RELEASE ORAL at 08:03

## 2024-03-26 RX ADMIN — ASPIRIN 81 MG CHEWABLE TABLET 81 MG: 81 TABLET CHEWABLE at 08:03

## 2024-03-26 RX ADMIN — MIDAZOLAM HYDROCHLORIDE 1 MG: 1 INJECTION, SOLUTION INTRAMUSCULAR; INTRAVENOUS at 08:03

## 2024-03-26 RX ADMIN — MUPIROCIN: 20 OINTMENT TOPICAL at 11:03

## 2024-03-26 RX ADMIN — ENOXAPARIN SODIUM 40 MG: 40 INJECTION SUBCUTANEOUS at 05:03

## 2024-03-26 RX ADMIN — FENTANYL CITRATE 50 MCG: 50 INJECTION, SOLUTION INTRAMUSCULAR; INTRAVENOUS at 05:03

## 2024-03-26 NOTE — PROGRESS NOTES
[facility] Intensive Care  Cardiology  Progress Note    Patient Name: Elizabeth Ayala  MRN: [id number]  Admission Date: 3/25/2024  Hospital Length of Stay: 1 days  Code Status: Full Code   Attending Physician: Vitaliy Brown MD   Primary Care Physician: Amna Levy MD  Expected Discharge Date:   Principal Problem:STEMI (ST elevation myocardial infarction)    Subjective:       Interval History:  Patient had an episode of chest pain last night, different than the pain she usually has with her angina.  Now chest pain-free.  Resolved with fentanyl with an nitro.  Laying comfortably in bed.  No complications from angiogram    Review of Systems   Constitutional: Negative.   HENT: Negative.     Eyes: Negative.    Respiratory: Negative.     Endocrine: Negative.    Hematologic/Lymphatic: Negative.    Skin: Negative.    Musculoskeletal: Negative.    Gastrointestinal: Negative.    Genitourinary: Negative.    Neurological: Negative.    Psychiatric/Behavioral: Negative.     Allergic/Immunologic: Negative.      Objective:     Vital Signs (Most Recent):  Temp: 98.7 °F (37.1 °C) (03/26/24 0800)  Pulse: 60 (03/26/24 1208)  Resp: (!) 23 (03/26/24 1208)  BP: (!) 152/68 (03/26/24 1100)  SpO2: 97 % (03/26/24 1208) Vital Signs (24h Range):  Temp:  [97.7 °F (36.5 °C)-98.7 °F (37.1 °C)] 98.7 °F (37.1 °C)  Pulse:  [57-99] 60  Resp:  [16-50] 23  SpO2:  [94 %-100 %] 97 %  BP: (113-157)/(57-96) 152/68     Weight: 67.6 kg (149 lb 0.5 oz)  Body mass index is 29.11 kg/m².     SpO2: 97 %       No intake or output data in the 24 hours ending 03/26/24 1322    Lines/Drains/Airways       Peripheral Intravenous Line  Duration                  Peripheral IV - Single Lumen 03/25/24 1111 20 G Right Antecubital 1 day         Peripheral IV - Single Lumen 03/25/24 1114 20 G Left Antecubital 1 day                       Physical Exam  Constitutional:       Appearance: Normal appearance. She is well-developed.   HENT:      Head: Normocephalic.   Eyes:       Pupils: Pupils are equal, round, and reactive to light.   Cardiovascular:      Rate and Rhythm: Normal rate and regular rhythm.   Pulmonary:      Effort: Pulmonary effort is normal.      Breath sounds: Normal breath sounds.   Abdominal:      General: Bowel sounds are normal.      Palpations: Abdomen is soft.      Tenderness: There is no abdominal tenderness.   Musculoskeletal:         General: Normal range of motion.      Cervical back: Normal range of motion and neck supple.   Skin:     General: Skin is warm.   Neurological:      Mental Status: She is alert and oriented to person, place, and time.            Significant Labs:     DATA:     Laboratory:  CBC:  Recent Labs   Lab 03/25/24  1109 03/26/24  0440   WBC 9.50 9.73   Hemoglobin 16.0 14.5   Hematocrit 46.0 42.5   Platelets 306 253       CHEMISTRIES:  Recent Labs   Lab 01/26/23  1413 04/06/23  1021 05/17/23  1258 03/25/24  1109 03/26/24  0440   Glucose  --   --   --  110 105   Sodium 136 138 141 139 140   Potassium 4.3 3.7 3.9 3.2 L 3.9   BUN 17.9 16.7 20.1 H 9 14   Creatinine 0.77 0.76 0.70 0.9 0.9   eGFR  78 L 80 L 88 L  --   --    Calcium 9.6 9.1 8.8 10.3 9.7       CARDIAC BIOMARKERS:  Recent Labs   Lab 03/25/24  1109 03/25/24  2257 03/26/24  0748   Troponin I 0.128 H 0.136 H 0.242 H       COAGS:  Recent Labs   Lab 03/25/24  1124   INR 1.0       LIPIDS/LFTS:  Recent Labs   Lab 01/26/23  1413 03/25/24  1109 03/26/24  0440   AST 24 24 14   ALT 25 16 14       Hemoglobin A1C   Date Value Ref Range Status   12/11/2007 5.5 4.5 - 6.2 % Final     Hemoglobin A1c   Date Value Ref Range Status   03/25/2021 4.9 <5.7 % of total Hgb Final     Comment:     For the purpose of screening for the presence of  diabetes:    <5.7%       Consistent with the absence of diabetes  5.7-6.4%    Consistent with increased risk for diabetes              (prediabetes)  > or =6.5%  Consistent with diabetes    This assay result is consistent with a decreased risk  of  diabetes.    Currently, no consensus exists regarding use of  hemoglobin A1c for diagnosis of diabetes in children.    According to American Diabetes Association (ADA)  guidelines, hemoglobin A1c <7.0% represents optimal  control in non-pregnant diabetic patients. Different  metrics may apply to specific patient populations.   Standards of Medical Care in Diabetes(ADA).           TSH        The ASCVD Risk score (Zack VALENCIA, et al., 2019) failed to calculate for the following reasons:    The 2019 ASCVD risk score is only valid for ages 40 to 79    The patient has a prior MI or stroke diagnosis       BNP    Lab Results   Component Value Date/Time     (H) 03/25/2024 11:09 AM    BNP 85 04/29/2015 03:31 PM            ECHO    Results for orders placed during the hospital encounter of 03/25/24    Echo    Interpretation Summary    Left Ventricle: There is concentric remodeling. There is normal systolic function with a visually estimated ejection fraction of 55 - 60%. Grade I diastolic dysfunction.    Right Ventricle: Normal right ventricular cavity size. Systolic function is normal.    Left Atrium: Left atrium is mildly dilated.    Aortic Valve: There is mild aortic regurgitation.    Tricuspid Valve: There is mild regurgitation.    Pulmonary Artery: The estimated pulmonary artery systolic pressure is 24 mmHg.    IVC/SVC: Normal venous pressure at 3 mmHg.      STRESS TEST    No results found for this or any previous visit.            Assessment and Plan:     Brief HPI:     * STEMI (ST elevation myocardial infarction)  Cath lab activated for inferior STEMI.  Aspirin Plavix taking emergently to cardiac catheterization lab    Risks, benefits and alternatives of the catheterization procedure were discussed with the patient.The risks of coronary angiography include but are not limited to: bleeding, infection, death, heart attack, arrhythmia, kidney injury or failure, potential need for dialysis, allergic reactions, stroke,  need for emergency surgery, hematoma, pseudoaneurysm etc.  Should stenting be indicated, the patient has agreed to dual anti-platelet therapy for 1-consecutive year with a drug-eluting stent and a minimum of 1-month with the use of a bare metal stent. Additionally, pt is aware that non-compliance is likely to result in stent clotting with heart attack, heart failure, and/or death  The risks of moderate sedation include hypotension, respiratory depression, arrhythmias, bronchospasm, and death. Informed consent was obtained and the  patient is agreeable to proceed with the procedure. Consent was placed on the chart.      3/26:  Status post PCI of subtotal severe heavily calcified RCA yesterday.  Also has severe 95% stenosis in proximal circ.  Plan for PCI tomorrow of the circ lesion  Continue dual antiplatelet therapy    Risks, benefits and alternatives of the catheterization procedure were discussed with the patient.The risks of coronary angiography include but are not limited to: bleeding, infection, death, heart attack, arrhythmia, kidney injury or failure, potential need for dialysis, allergic reactions, stroke, need for emergency surgery, hematoma, pseudoaneurysm etc.  Should stenting be indicated, the patient has agreed to dual anti-platelet therapy for 1-consecutive year with a drug-eluting stent and a minimum of 1-month with the use of a bare metal stent. Additionally, pt is aware that non-compliance is likely to result in stent clotting with heart attack, heart failure, and/or death  The risks of moderate sedation include hypotension, respiratory depression, arrhythmias, bronchospasm, and death. Informed consent was obtained and the  patient is agreeable to proceed with the procedure. Consent was placed on the chart.      CAD (coronary artery disease)  Patient with known CAD s/p stent placement, which is uncontrolled Will continue ASA, Plavix, and Statin and monitor for S/Sx of angina/ACS.         VTE Risk  Mitigation (From admission, onward)           Ordered     enoxaparin injection 40 mg  Every 24 hours         03/26/24 0935                    Vitaliy Brown MD  Cardiology  [facility] - Intensive Care    Critical Care Time:  35 minutes     Critical care was time spent personally by me on the following activities: development of treatment plan with patient or surrogate and bedside caregivers, discussions with consultants, evaluation of patient's response to treatment, examination of patient, ordering and performing treatments and interventions, ordering and review of laboratory studies, ordering and review of radiographic studies, pulse oximetry, re-evaluation of patient's condition. This critical care time did not overlap with that of any other provider or involve time for any procedures.

## 2024-03-26 NOTE — NURSING
[facility] [place]  Nurses Note -- 4 Eyes      3/25/2024       Skin assessed on: Q Shift      [x] No Pressure Injuries Present    []Prevention Measures Documented    [] Yes LDA  for Pressure Injury Previously documented     [] Yes New Pressure Injury Discovered   [] LDA for New Pressure Injury Added      Attending RN:  Kasandra Tipton RN     Second RN:  Summer ASHBY RN

## 2024-03-26 NOTE — NURSING
Patient reports mid sternal chest pain 8-9/10. 4L NC placed on patient and 12 lead performed. VS stable. Dr. Brown called and informed of patient change. MD reviewed EKGs and placed orders. Orders followed, patient reports decreased chest pain now 1-2/10.

## 2024-03-26 NOTE — NURSING
[facility] Intensive Care  ICU Shift Summary  Date: 3/26/2024      Prehospitalization: Home  Admit Date / LOS : 3/25/2024/ 1 days    Diagnosis: STEMI (ST elevation myocardial infarction)    Consults:        Active: Cardio       Needed: N/A     Code Status: Full Code   Advanced Directive: <no information>    LDA:  Lines/Drains/Airways       Peripheral Intravenous Line  Duration                  Peripheral IV - Single Lumen 03/25/24 1111 20 G Right Antecubital <1 day         Peripheral IV - Single Lumen 03/25/24 1114 20 G Left Antecubital <1 day                  Central Lines/Site/Justification:Patient Does Not Have Central Line  Urinary Cath/Order/Justification:Patient Does Not Have Urinary Catheter    Vasopressors/Infusions:        GOALS: Volume/ Hemodynamic: N/A                     RASS: N/A    Pain Management: IV       Pain Controlled: yes     Rhythm: NSR    Respiratory Device: Nasal Cannula           VTE Prophylaxis: Pharm  Mobility: A: Assist  Stress Ulcer Prophylaxis: Yes    Isolation: No active isolations    Dietary:   Current Diet Order   Procedures    Diet NPO      Tolerance: yes  Advancement: yes    I & O (24h):  No intake or output data in the 24 hours ending 03/26/24 0643     Restraints: No    Significant Dates:  Post Op Date: N/A  Rescue Date: N/A  Imaging/ Diagnostics: N/A    Noteworthy Labs:  none    COVID Test: (--)  CBC/Anemia Labs: Coags:    Recent Labs   Lab 03/25/24  1109 03/26/24  0440   WBC 9.50 9.73   HGB 16.0 14.5   HCT 46.0 42.5    253   MCV 83 85   RDW 12.6 13.0    Recent Labs   Lab 03/25/24  1124   INR 1.0        Chemistries:   Recent Labs   Lab 03/25/24  1109 03/26/24  0440    140   K 3.2* 3.9    102   CO2 23 28   BUN 9 14   CREATININE 0.9 0.9   CALCIUM 10.3 9.7   PROT 7.6 6.3   BILITOT 0.8 0.7   ALKPHOS 102 91   ALT 16 14   AST 24 14        Cardiac Enzymes: Ejection Fractions:    Recent Labs     03/25/24  1109 03/25/24  2257   TROPONINI 0.128* 0.136*    No results  "found for: "EF"     POCT Glucose: HbA1c:    Recent Labs   Lab 03/25/24  1628 03/25/24  2147   POCTGLUCOSE 107 97    Hemoglobin A1C   Date Value Ref Range Status   12/11/2007 5.5 4.5 - 6.2 % Final     Hemoglobin A1c   Date Value Ref Range Status   03/25/2021 4.9 <5.7 % of total Hgb Final     Comment:     For the purpose of screening for the presence of  diabetes:    <5.7%       Consistent with the absence of diabetes  5.7-6.4%    Consistent with increased risk for diabetes              (prediabetes)  > or =6.5%  Consistent with diabetes    This assay result is consistent with a decreased risk  of diabetes.    Currently, no consensus exists regarding use of  hemoglobin A1c for diagnosis of diabetes in children.    According to American Diabetes Association (ADA)  guidelines, hemoglobin A1c <7.0% represents optimal  control in non-pregnant diabetic patients. Different  metrics may apply to specific patient populations.   Standards of Medical Care in Diabetes(ADA).               ICU LOS 17h  Level of Care: Critical Care    Chart Check: 12 HR Done  Shift Summary/Plan for the shift: Patient to go back to the cath lab today  "

## 2024-03-26 NOTE — NURSING
"At 1715 pt started to complain of chest & neck pain 8/10.  Dr. Brown notified.  12-lead EKG obtained.  PRN nitro received with pain now 7/10.  Offered PRN fentanyl, pt stated they would like to wait and see if pain subsides with nitro.     1750: Pt still complaining of chest pain.  PRN fentanyl received.     1830: Pt stated pain is getting a "little bit" better    1845: Pt complaining of chest pain now 8/10.  Dr Brown notified.   "

## 2024-03-26 NOTE — PLAN OF CARE
Pt remains in the ICU on RA.  NSR on the monitor.  Afebrile.  Aox4.  Pt up to bathroom, tolerated well.  No complaints of chest pain this shift.  Pt to be NPO at midnight for cath procedure in the morning.  Daughter at bedside, plan of care reviewed.  No injuries, falls or skin breakdown occurred this shift.

## 2024-03-26 NOTE — PLAN OF CARE
Case Management Assessment     PCP: Amna Levy MD    Pharmacy:   [id number] - [place] - [address]  [address] [address]  Phone: [phone] Fax: [phone]        Patient Arrived From: Home  Existing Help at Home: spouse    Barriers to Discharge: no barriers    Discharge Plan:    A. Home   B. Home       03/26/24 1241   Discharge Assessment   Assessment Type Discharge Planning Assessment   Confirmed/corrected address, phone number and insurance Yes   Confirmed Demographics Correct on Facesheet   Source of Information patient   Communicated LO with patient/caregiver Date not available/Unable to determine   Reason For Admission STEMI   People in Home spouse   Do you expect to return to your current living situation? Yes   Do you have help at home or someone to help you manage your care at home? Yes   Who are your caregiver(s) and their phone number(s)? Rock Ayala (Spouse) [phone] (Home Phone)   Prior to hospitilization cognitive status: Alert/Oriented   Current cognitive status: Alert/Oriented   Equipment Currently Used at Home none   Readmission within 30 days? No   Patient currently being followed by outpatient case management? No   Do you currently have service(s) that help you manage your care at home? No   Do you take prescription medications? Yes   Do you have prescription coverage? Yes   Coverage [employer]   Do you have any problems affording any of your prescribed medications? No   Is the patient taking medications as prescribed? yes   Who is going to help you get home at discharge? Rock Ayala (Spouse) [phone] (Home Phone)   How do you get to doctors appointments? car, drives self   Are you on dialysis? No   Do you take coumadin? No   Discharge Plan A Home   Discharge Plan B Home   DME Needed Upon Discharge  none   Discharge Plan discussed with: Patient   Transition of Care Barriers None

## 2024-03-26 NOTE — SUBJECTIVE & OBJECTIVE
Interval History:  Patient had an episode of chest pain last night, different than the pain she usually has with her angina.  Now chest pain-free.  Resolved with fentanyl with an nitro.  Laying comfortably in bed.  No complications from angiogram    Review of Systems   Constitutional: Negative.   HENT: Negative.     Eyes: Negative.    Respiratory: Negative.     Endocrine: Negative.    Hematologic/Lymphatic: Negative.    Skin: Negative.    Musculoskeletal: Negative.    Gastrointestinal: Negative.    Genitourinary: Negative.    Neurological: Negative.    Psychiatric/Behavioral: Negative.     Allergic/Immunologic: Negative.      Objective:     Vital Signs (Most Recent):  Temp: 98.7 °F (37.1 °C) (03/26/24 0800)  Pulse: 60 (03/26/24 1208)  Resp: (!) 23 (03/26/24 1208)  BP: (!) 152/68 (03/26/24 1100)  SpO2: 97 % (03/26/24 1208) Vital Signs (24h Range):  Temp:  [97.7 °F (36.5 °C)-98.7 °F (37.1 °C)] 98.7 °F (37.1 °C)  Pulse:  [57-99] 60  Resp:  [16-50] 23  SpO2:  [94 %-100 %] 97 %  BP: (113-157)/(57-96) 152/68     Weight: 67.6 kg (149 lb 0.5 oz)  Body mass index is 29.11 kg/m².     SpO2: 97 %       No intake or output data in the 24 hours ending 03/26/24 1322    Lines/Drains/Airways       Peripheral Intravenous Line  Duration                  Peripheral IV - Single Lumen 03/25/24 1111 20 G Right Antecubital 1 day         Peripheral IV - Single Lumen 03/25/24 1114 20 G Left Antecubital 1 day                       Physical Exam  Constitutional:       Appearance: Normal appearance. She is well-developed.   HENT:      Head: Normocephalic.   Eyes:      Pupils: Pupils are equal, round, and reactive to light.   Cardiovascular:      Rate and Rhythm: Normal rate and regular rhythm.   Pulmonary:      Effort: Pulmonary effort is normal.      Breath sounds: Normal breath sounds.   Abdominal:      General: Bowel sounds are normal.      Palpations: Abdomen is soft.      Tenderness: There is no abdominal tenderness.   Musculoskeletal:          General: Normal range of motion.      Cervical back: Normal range of motion and neck supple.   Skin:     General: Skin is warm.   Neurological:      Mental Status: She is alert and oriented to person, place, and time.            Significant Labs:     DATA:     Laboratory:  CBC:  Recent Labs   Lab 03/25/24  1109 03/26/24  0440   WBC 9.50 9.73   Hemoglobin 16.0 14.5   Hematocrit 46.0 42.5   Platelets 306 253       CHEMISTRIES:  Recent Labs   Lab 01/26/23  1413 04/06/23  1021 05/17/23  1258 03/25/24  1109 03/26/24  0440   Glucose  --   --   --  110 105   Sodium 136 138 141 139 140   Potassium 4.3 3.7 3.9 3.2 L 3.9   BUN 17.9 16.7 20.1 H 9 14   Creatinine 0.77 0.76 0.70 0.9 0.9   eGFR  78 L 80 L 88 L  --   --    Calcium 9.6 9.1 8.8 10.3 9.7       CARDIAC BIOMARKERS:  Recent Labs   Lab 03/25/24  1109 03/25/24  2257 03/26/24  0748   Troponin I 0.128 H 0.136 H 0.242 H       COAGS:  Recent Labs   Lab 03/25/24  1124   INR 1.0       LIPIDS/LFTS:  Recent Labs   Lab 01/26/23  1413 03/25/24  1109 03/26/24  0440   AST 24 24 14   ALT 25 16 14       Hemoglobin A1C   Date Value Ref Range Status   12/11/2007 5.5 4.5 - 6.2 % Final     Hemoglobin A1c   Date Value Ref Range Status   03/25/2021 4.9 <5.7 % of total Hgb Final     Comment:     For the purpose of screening for the presence of  diabetes:    <5.7%       Consistent with the absence of diabetes  5.7-6.4%    Consistent with increased risk for diabetes              (prediabetes)  > or =6.5%  Consistent with diabetes    This assay result is consistent with a decreased risk  of diabetes.    Currently, no consensus exists regarding use of  hemoglobin A1c for diagnosis of diabetes in children.    According to American Diabetes Association (ADA)  guidelines, hemoglobin A1c <7.0% represents optimal  control in non-pregnant diabetic patients. Different  metrics may apply to specific patient populations.   Standards of Medical Care in Diabetes(ADA).           TSH         The ASCVD Risk score (Zack VALENCIA, et al., 2019) failed to calculate for the following reasons:    The 2019 ASCVD risk score is only valid for ages 40 to 79    The patient has a prior MI or stroke diagnosis       BNP    Lab Results   Component Value Date/Time     (H) 03/25/2024 11:09 AM    BNP 85 04/29/2015 03:31 PM            ECHO    Results for orders placed during the hospital encounter of 03/25/24    Echo    Interpretation Summary    Left Ventricle: There is concentric remodeling. There is normal systolic function with a visually estimated ejection fraction of 55 - 60%. Grade I diastolic dysfunction.    Right Ventricle: Normal right ventricular cavity size. Systolic function is normal.    Left Atrium: Left atrium is mildly dilated.    Aortic Valve: There is mild aortic regurgitation.    Tricuspid Valve: There is mild regurgitation.    Pulmonary Artery: The estimated pulmonary artery systolic pressure is 24 mmHg.    IVC/SVC: Normal venous pressure at 3 mmHg.      STRESS TEST    No results found for this or any previous visit.

## 2024-03-26 NOTE — ASSESSMENT & PLAN NOTE
Cath lab activated for inferior STEMI.  Aspirin Plavix taking emergently to cardiac catheterization lab    Risks, benefits and alternatives of the catheterization procedure were discussed with the patient.The risks of coronary angiography include but are not limited to: bleeding, infection, death, heart attack, arrhythmia, kidney injury or failure, potential need for dialysis, allergic reactions, stroke, need for emergency surgery, hematoma, pseudoaneurysm etc.  Should stenting be indicated, the patient has agreed to dual anti-platelet therapy for 1-consecutive year with a drug-eluting stent and a minimum of 1-month with the use of a bare metal stent. Additionally, pt is aware that non-compliance is likely to result in stent clotting with heart attack, heart failure, and/or death  The risks of moderate sedation include hypotension, respiratory depression, arrhythmias, bronchospasm, and death. Informed consent was obtained and the  patient is agreeable to proceed with the procedure. Consent was placed on the chart.      3/26:  Status post PCI of subtotal severe heavily calcified RCA yesterday.  Also has severe 95% stenosis in proximal circ.  Plan for PCI tomorrow of the circ lesion  Continue dual antiplatelet therapy    Risks, benefits and alternatives of the catheterization procedure were discussed with the patient.The risks of coronary angiography include but are not limited to: bleeding, infection, death, heart attack, arrhythmia, kidney injury or failure, potential need for dialysis, allergic reactions, stroke, need for emergency surgery, hematoma, pseudoaneurysm etc.  Should stenting be indicated, the patient has agreed to dual anti-platelet therapy for 1-consecutive year with a drug-eluting stent and a minimum of 1-month with the use of a bare metal stent. Additionally, pt is aware that non-compliance is likely to result in stent clotting with heart attack, heart failure, and/or death  The risks of moderate  sedation include hypotension, respiratory depression, arrhythmias, bronchospasm, and death. Informed consent was obtained and the  patient is agreeable to proceed with the procedure. Consent was placed on the chart.

## 2024-03-27 LAB
ALLENS TEST: ABNORMAL
ANION GAP SERPL CALC-SCNC: 6 MMOL/L (ref 8–16)
APTT PPP: 44.6 SEC (ref 21–32)
APTT PPP: 74.1 SEC (ref 21–32)
BASOPHILS # BLD AUTO: 0.02 K/UL (ref 0–0.2)
BASOPHILS NFR BLD: 0.2 % (ref 0–1.9)
BUN SERPL-MCNC: 19 MG/DL (ref 8–23)
CALCIUM SERPL-MCNC: 9.2 MG/DL (ref 8.7–10.5)
CHLORIDE SERPL-SCNC: 103 MMOL/L (ref 95–110)
CO2 SERPL-SCNC: 28 MMOL/L (ref 23–29)
CREAT SERPL-MCNC: 0.7 MG/DL (ref 0.5–1.4)
DIFFERENTIAL METHOD BLD: NORMAL
EOSINOPHIL # BLD AUTO: 0.3 K/UL (ref 0–0.5)
EOSINOPHIL NFR BLD: 2.7 % (ref 0–8)
ERYTHROCYTE [DISTWIDTH] IN BLOOD BY AUTOMATED COUNT: 12.7 % (ref 11.5–14.5)
EST. GFR  (NO RACE VARIABLE): >60 ML/MIN/1.73 M^2
GLUCOSE SERPL-MCNC: 117 MG/DL (ref 70–110)
HCT VFR BLD AUTO: 40.9 % (ref 37–48.5)
HGB BLD-MCNC: 13.6 G/DL (ref 12–16)
IMM GRANULOCYTES # BLD AUTO: 0.03 K/UL (ref 0–0.04)
IMM GRANULOCYTES NFR BLD AUTO: 0.3 % (ref 0–0.5)
LYMPHOCYTES # BLD AUTO: 2.4 K/UL (ref 1–4.8)
LYMPHOCYTES NFR BLD: 23.6 % (ref 18–48)
MCH RBC QN AUTO: 28 PG (ref 27–31)
MCHC RBC AUTO-ENTMCNC: 33.3 G/DL (ref 32–36)
MCV RBC AUTO: 84 FL (ref 82–98)
MONOCYTES # BLD AUTO: 1 K/UL (ref 0.3–1)
MONOCYTES NFR BLD: 10 % (ref 4–15)
NEUTROPHILS # BLD AUTO: 6.3 K/UL (ref 1.8–7.7)
NEUTROPHILS NFR BLD: 63.2 % (ref 38–73)
NRBC BLD-RTO: 0 /100 WBC
PLATELET # BLD AUTO: 235 K/UL (ref 150–450)
PMV BLD AUTO: 9.4 FL (ref 9.2–12.9)
POC ACTIVATED CLOTTING TIME K: 152 SEC (ref 74–137)
POC ACTIVATED CLOTTING TIME K: 174 SEC (ref 74–137)
POC ACTIVATED CLOTTING TIME K: 174 SEC (ref 74–137)
POC ACTIVATED CLOTTING TIME K: 185 SEC (ref 74–137)
POC ACTIVATED CLOTTING TIME K: 212 SEC (ref 74–137)
POC ACTIVATED CLOTTING TIME K: 250 SEC (ref 74–137)
POC ACTIVATED CLOTTING TIME K: 304 SEC (ref 74–137)
POCT GLUCOSE: 107 MG/DL (ref 70–110)
POCT GLUCOSE: 130 MG/DL (ref 70–110)
POCT GLUCOSE: 130 MG/DL (ref 70–110)
POCT GLUCOSE: 83 MG/DL (ref 70–110)
POTASSIUM SERPL-SCNC: 3.9 MMOL/L (ref 3.5–5.1)
RBC # BLD AUTO: 4.85 M/UL (ref 4–5.4)
SAMPLE: ABNORMAL
SITE: ABNORMAL
SODIUM SERPL-SCNC: 137 MMOL/L (ref 136–145)
WBC # BLD AUTO: 9.99 K/UL (ref 3.9–12.7)

## 2024-03-27 PROCEDURE — 25000003 PHARM REV CODE 250: Performed by: INTERNAL MEDICINE

## 2024-03-27 PROCEDURE — C1874 STENT, COATED/COV W/DEL SYS: HCPCS | Performed by: INTERNAL MEDICINE

## 2024-03-27 PROCEDURE — 92978 ENDOLUMINL IVUS OCT C 1ST: CPT | Mod: 26,LC,ICN, | Performed by: INTERNAL MEDICINE

## 2024-03-27 PROCEDURE — C1894 INTRO/SHEATH, NON-LASER: HCPCS | Performed by: INTERNAL MEDICINE

## 2024-03-27 PROCEDURE — 36415 COLL VENOUS BLD VENIPUNCTURE: CPT | Performed by: INTERNAL MEDICINE

## 2024-03-27 PROCEDURE — C1887 CATHETER, GUIDING: HCPCS | Performed by: INTERNAL MEDICINE

## 2024-03-27 PROCEDURE — 99152 MOD SED SAME PHYS/QHP 5/>YRS: CPT | Performed by: INTERNAL MEDICINE

## 2024-03-27 PROCEDURE — 27000221 HC OXYGEN, UP TO 24 HOURS

## 2024-03-27 PROCEDURE — B241ZZ3 ULTRASONOGRAPHY OF MULTIPLE CORONARY ARTERIES, INTRAVASCULAR: ICD-10-PCS | Performed by: INTERNAL MEDICINE

## 2024-03-27 PROCEDURE — C1769 GUIDE WIRE: HCPCS | Performed by: INTERNAL MEDICINE

## 2024-03-27 PROCEDURE — C1753 CATH, INTRAVAS ULTRASOUND: HCPCS | Performed by: INTERNAL MEDICINE

## 2024-03-27 PROCEDURE — C9600 PERC DRUG-EL COR STENT SING: HCPCS | Mod: LC | Performed by: INTERNAL MEDICINE

## 2024-03-27 PROCEDURE — 85347 COAGULATION TIME ACTIVATED: CPT | Performed by: INTERNAL MEDICINE

## 2024-03-27 PROCEDURE — 37799 UNLISTED PX VASCULAR SURGERY: CPT

## 2024-03-27 PROCEDURE — 92928 PRQ TCAT PLMT NTRAC ST 1 LES: CPT | Mod: LC,ICN,, | Performed by: INTERNAL MEDICINE

## 2024-03-27 PROCEDURE — 85347 COAGULATION TIME ACTIVATED: CPT

## 2024-03-27 PROCEDURE — 94761 N-INVAS EAR/PLS OXIMETRY MLT: CPT

## 2024-03-27 PROCEDURE — 20000000 HC ICU ROOM

## 2024-03-27 PROCEDURE — 80048 BASIC METABOLIC PNL TOTAL CA: CPT | Performed by: INTERNAL MEDICINE

## 2024-03-27 PROCEDURE — 027035Z DILATION OF CORONARY ARTERY, ONE ARTERY WITH TWO DRUG-ELUTING INTRALUMINAL DEVICES, PERCUTANEOUS APPROACH: ICD-10-PCS | Performed by: INTERNAL MEDICINE

## 2024-03-27 PROCEDURE — 63600175 PHARM REV CODE 636 W HCPCS: Performed by: INTERNAL MEDICINE

## 2024-03-27 PROCEDURE — 85730 THROMBOPLASTIN TIME PARTIAL: CPT | Mod: 91 | Performed by: INTERNAL MEDICINE

## 2024-03-27 PROCEDURE — 99153 MOD SED SAME PHYS/QHP EA: CPT | Performed by: INTERNAL MEDICINE

## 2024-03-27 PROCEDURE — 99152 MOD SED SAME PHYS/QHP 5/>YRS: CPT | Mod: ,,, | Performed by: INTERNAL MEDICINE

## 2024-03-27 PROCEDURE — 99900035 HC TECH TIME PER 15 MIN (STAT)

## 2024-03-27 PROCEDURE — C1725 CATH, TRANSLUMIN NON-LASER: HCPCS | Performed by: INTERNAL MEDICINE

## 2024-03-27 PROCEDURE — 92978 ENDOLUMINL IVUS OCT C 1ST: CPT | Mod: LC | Performed by: INTERNAL MEDICINE

## 2024-03-27 PROCEDURE — 85025 COMPLETE CBC W/AUTO DIFF WBC: CPT | Performed by: INTERNAL MEDICINE

## 2024-03-27 DEVICE — EVEROLIMUS-ELUTING PLATINUM CHROMIUM CORONARY STENT SYSTEM
Type: IMPLANTABLE DEVICE | Site: CORONARY | Status: FUNCTIONAL
Brand: SYNERGY™ XD

## 2024-03-27 RX ORDER — ONDANSETRON HYDROCHLORIDE 2 MG/ML
4 INJECTION, SOLUTION INTRAVENOUS EVERY 12 HOURS PRN
Status: DISCONTINUED | OUTPATIENT
Start: 2024-03-27 | End: 2024-03-27 | Stop reason: SDUPTHER

## 2024-03-27 RX ORDER — MIDAZOLAM HYDROCHLORIDE 1 MG/ML
INJECTION, SOLUTION INTRAMUSCULAR; INTRAVENOUS
Status: DISCONTINUED | OUTPATIENT
Start: 2024-03-27 | End: 2024-03-27 | Stop reason: HOSPADM

## 2024-03-27 RX ORDER — HEPARIN SODIUM 1000 [USP'U]/ML
INJECTION, SOLUTION INTRAVENOUS; SUBCUTANEOUS
Status: DISCONTINUED | OUTPATIENT
Start: 2024-03-27 | End: 2024-03-27 | Stop reason: HOSPADM

## 2024-03-27 RX ORDER — LIDOCAINE HYDROCHLORIDE 10 MG/ML
INJECTION, SOLUTION EPIDURAL; INFILTRATION; INTRACAUDAL; PERINEURAL
Status: DISCONTINUED | OUTPATIENT
Start: 2024-03-27 | End: 2024-03-27 | Stop reason: HOSPADM

## 2024-03-27 RX ORDER — FENTANYL CITRATE 50 UG/ML
INJECTION, SOLUTION INTRAMUSCULAR; INTRAVENOUS
Status: DISCONTINUED | OUTPATIENT
Start: 2024-03-27 | End: 2024-03-27 | Stop reason: HOSPADM

## 2024-03-27 RX ORDER — DIPHENHYDRAMINE HCL 25 MG
50 CAPSULE ORAL ONCE
Status: COMPLETED | OUTPATIENT
Start: 2024-03-27 | End: 2024-03-27

## 2024-03-27 RX ORDER — ACETAMINOPHEN 325 MG/1
650 TABLET ORAL EVERY 4 HOURS PRN
Status: DISCONTINUED | OUTPATIENT
Start: 2024-03-27 | End: 2024-03-27 | Stop reason: SDUPTHER

## 2024-03-27 RX ORDER — NITROGLYCERIN 20 MG/100ML
INJECTION INTRAVENOUS
Status: DISCONTINUED | OUTPATIENT
Start: 2024-03-27 | End: 2024-03-27 | Stop reason: HOSPADM

## 2024-03-27 RX ORDER — AMLODIPINE BESYLATE 5 MG/1
5 TABLET ORAL DAILY
Status: DISCONTINUED | OUTPATIENT
Start: 2024-03-28 | End: 2024-03-28 | Stop reason: HOSPADM

## 2024-03-27 RX ORDER — SODIUM CHLORIDE 9 MG/ML
INJECTION, SOLUTION INTRAVENOUS CONTINUOUS
Status: DISCONTINUED | OUTPATIENT
Start: 2024-03-27 | End: 2024-03-28 | Stop reason: HOSPADM

## 2024-03-27 RX ADMIN — MUPIROCIN: 20 OINTMENT TOPICAL at 09:03

## 2024-03-27 RX ADMIN — METOPROLOL SUCCINATE 50 MG: 50 TABLET, EXTENDED RELEASE ORAL at 08:03

## 2024-03-27 RX ADMIN — ISOSORBIDE MONONITRATE 30 MG: 30 TABLET, EXTENDED RELEASE ORAL at 08:03

## 2024-03-27 RX ADMIN — ASPIRIN 81 MG CHEWABLE TABLET 81 MG: 81 TABLET CHEWABLE at 08:03

## 2024-03-27 RX ADMIN — CLOPIDOGREL BISULFATE 75 MG: 75 TABLET ORAL at 08:03

## 2024-03-27 RX ADMIN — COLCHICINE 0.6 MG: 0.6 TABLET, FILM COATED ORAL at 08:03

## 2024-03-27 RX ADMIN — ATORVASTATIN CALCIUM 80 MG: 40 TABLET, FILM COATED ORAL at 10:03

## 2024-03-27 RX ADMIN — DIPHENHYDRAMINE HYDROCHLORIDE 50 MG: 25 CAPSULE ORAL at 07:03

## 2024-03-27 RX ADMIN — AMLODIPINE BESYLATE 2.5 MG: 2.5 TABLET ORAL at 08:03

## 2024-03-27 RX ADMIN — MUPIROCIN: 20 OINTMENT TOPICAL at 08:03

## 2024-03-27 NOTE — NURSING
[facility]  Nurses Note -- 4 Eyes      3/27/2024       Skin assessed on: Q Shift      [x] No Pressure Injuries Present    [x]Prevention Measures Documented    [] Yes LDA  for Pressure Injury Previously documented     [] Yes New Pressure Injury Discovered   [] LDA for New Pressure Injury Added      Attending RN:  Ansley WEBB RN     Second RN:  KIRAN Willis

## 2024-03-27 NOTE — PROGRESS NOTES
[facility]  Cardiology  Progress Note    Patient Name: Elizabeth Ayala  MRN: [id number]  Admission Date: 3/25/2024  Hospital Length of Stay: 1 days  Code Status: Full Code   Attending Physician: Vitaliy Brown MD   Primary Care Physician: Amna Levy MD  Expected Discharge Date:   Principal Problem:STEMI (ST elevation myocardial infarction)    Subjective:     Interval History:  Was called in because patient having chest pains.  Patient states the pain is different than the pain she came in with.  This time this pain is on taking a deep inspiration.  EKG done which did not show any new ischemic changes.    Review of Systems   Constitutional: Negative.   HENT: Negative.     Eyes: Negative.    Cardiovascular:  Positive for chest pain.   Respiratory: Negative.     Endocrine: Negative.    Hematologic/Lymphatic: Negative.    Skin: Negative.    Musculoskeletal: Negative.    Gastrointestinal: Negative.    Genitourinary: Negative.    Neurological: Negative.    Psychiatric/Behavioral: Negative.     Allergic/Immunologic: Negative.      Objective:     Vital Signs (Most Recent):  Temp: 98.3 °F (36.8 °C) (03/26/24 1728)  Pulse: 62 (03/26/24 1830)  Resp: 18 (03/26/24 1830)  BP: (!) 145/65 (03/26/24 1800)  SpO2: 98 % (03/26/24 1830) Vital Signs (24h Range):  Temp:  [97.7 °F (36.5 °C)-98.7 °F (37.1 °C)] 98.3 °F (36.8 °C)  Pulse:  [57-89] 62  Resp:  [13-50] 18  SpO2:  [95 %-100 %] 98 %  BP: (131-157)/(60-96) 145/65     Weight: 67.6 kg (149 lb 0.5 oz)  Body mass index is 29.11 kg/m².     SpO2: 98 %       No intake or output data in the 24 hours ending 03/26/24 1911    Lines/Drains/Airways       Peripheral Intravenous Line  Duration                  Peripheral IV - Single Lumen 03/25/24 1111 20 G Right Antecubital 1 day         Peripheral IV - Single Lumen 03/25/24 1114 20 G Left Antecubital 1 day                       Physical Exam  Constitutional:       Appearance: Normal appearance. She is well-developed.   HENT:       Head: Normocephalic.   Eyes:      Pupils: Pupils are equal, round, and reactive to light.   Cardiovascular:      Rate and Rhythm: Normal rate and regular rhythm.   Pulmonary:      Effort: Pulmonary effort is normal.      Breath sounds: Normal breath sounds.   Abdominal:      General: Bowel sounds are normal.      Palpations: Abdomen is soft.      Tenderness: There is no abdominal tenderness.   Musculoskeletal:         General: Normal range of motion.      Cervical back: Normal range of motion and neck supple.   Skin:     General: Skin is warm.   Neurological:      Mental Status: She is alert and oriented to person, place, and time.            Significant Labs:     DATA:     Laboratory:  CBC:  Recent Labs   Lab 03/25/24  1109 03/26/24  0440   WBC 9.50 9.73   Hemoglobin 16.0 14.5   Hematocrit 46.0 42.5   Platelets 306 253       CHEMISTRIES:  Recent Labs   Lab 01/26/23  1413 04/06/23  1021 05/17/23  1258 03/25/24  1109 03/26/24  0440   Glucose  --   --   --  110 105   Sodium 136 138 141 139 140   Potassium 4.3 3.7 3.9 3.2 L 3.9   BUN 17.9 16.7 20.1 H 9 14   Creatinine 0.77 0.76 0.70 0.9 0.9   eGFR  78 L 80 L 88 L  --   --    Calcium 9.6 9.1 8.8 10.3 9.7       CARDIAC BIOMARKERS:  Recent Labs   Lab 03/25/24  1109 03/25/24  2257 03/26/24  0748   Troponin I 0.128 H 0.136 H 0.242 H       COAGS:  Recent Labs   Lab 03/25/24  1124   INR 1.0       LIPIDS/LFTS:  Recent Labs   Lab 01/26/23  1413 03/25/24  1109 03/26/24  0440   AST 24 24 14   ALT 25 16 14       Hemoglobin A1C   Date Value Ref Range Status   12/11/2007 5.5 4.5 - 6.2 % Final     Hemoglobin A1c   Date Value Ref Range Status   03/25/2021 4.9 <5.7 % of total Hgb Final     Comment:     For the purpose of screening for the presence of  diabetes:    <5.7%       Consistent with the absence of diabetes  5.7-6.4%    Consistent with increased risk for diabetes              (prediabetes)  > or =6.5%  Consistent with diabetes    This assay result is  consistent with a decreased risk  of diabetes.    Currently, no consensus exists regarding use of  hemoglobin A1c for diagnosis of diabetes in children.    According to American Diabetes Association (ADA)  guidelines, hemoglobin A1c <7.0% represents optimal  control in non-pregnant diabetic patients. Different  metrics may apply to specific patient populations.   Standards of Medical Care in Diabetes(ADA).           TSH        The ASCVD Risk score (Zack VALENCIA, et al., 2019) failed to calculate for the following reasons:    The 2019 ASCVD risk score is only valid for ages 40 to 79    The patient has a prior MI or stroke diagnosis       BNP    Lab Results   Component Value Date/Time     (H) 03/25/2024 11:09 AM    BNP 85 04/29/2015 03:31 PM            ECHO    Results for orders placed during the hospital encounter of 03/25/24    Echo    Interpretation Summary    Left Ventricle: There is concentric remodeling. There is normal systolic function with a visually estimated ejection fraction of 55 - 60%. Grade I diastolic dysfunction.    Right Ventricle: Normal right ventricular cavity size. Systolic function is normal.    Left Atrium: Left atrium is mildly dilated.    Aortic Valve: There is mild aortic regurgitation.    Tricuspid Valve: There is mild regurgitation.    Pulmonary Artery: The estimated pulmonary artery systolic pressure is 24 mmHg.    IVC/SVC: Normal venous pressure at 3 mmHg.      STRESS TEST    No results found for this or any previous visit.        CATH    No results found for this or any previous visit.      Assessment and Plan:     Brief HPI:     * STEMI (ST elevation myocardial infarction)  Cath lab activated for inferior STEMI.  Aspirin Plavix taking emergently to cardiac catheterization lab    Risks, benefits and alternatives of the catheterization procedure were discussed with the patient.The risks of coronary angiography include but are not limited to: bleeding, infection, death, heart attack,  arrhythmia, kidney injury or failure, potential need for dialysis, allergic reactions, stroke, need for emergency surgery, hematoma, pseudoaneurysm etc.  Should stenting be indicated, the patient has agreed to dual anti-platelet therapy for 1-consecutive year with a drug-eluting stent and a minimum of 1-month with the use of a bare metal stent. Additionally, pt is aware that non-compliance is likely to result in stent clotting with heart attack, heart failure, and/or death  The risks of moderate sedation include hypotension, respiratory depression, arrhythmias, bronchospasm, and death. Informed consent was obtained and the  patient is agreeable to proceed with the procedure. Consent was placed on the chart.      3/26:  Status post PCI of subtotal severe heavily calcified RCA yesterday.  Also has severe 95% stenosis in proximal circ.  Plan for PCI tomorrow of the circ lesion  Continue dual antiplatelet therapy    Risks, benefits and alternatives of the catheterization procedure were discussed with the patient.The risks of coronary angiography include but are not limited to: bleeding, infection, death, heart attack, arrhythmia, kidney injury or failure, potential need for dialysis, allergic reactions, stroke, need for emergency surgery, hematoma, pseudoaneurysm etc.  Should stenting be indicated, the patient has agreed to dual anti-platelet therapy for 1-consecutive year with a drug-eluting stent and a minimum of 1-month with the use of a bare metal stent. Additionally, pt is aware that non-compliance is likely to result in stent clotting with heart attack, heart failure, and/or death  The risks of moderate sedation include hypotension, respiratory depression, arrhythmias, bronchospasm, and death. Informed consent was obtained and the  patient is agreeable to proceed with the procedure. Consent was placed on the chart.      Dressler's syndrome  Pericarditis post MI. initiate colchicine.      CAD (coronary artery  disease)  Patient with known CAD s/p stent placement, which is uncontrolled Will continue ASA, Plavix, and Statin and monitor for S/Sx of angina/ACS.         VTE Risk Mitigation (From admission, onward)           Ordered     heparin 25,000 units in dextrose 5% (100 units/ml) IV bolus from bag LOW INTENSITY nomogram - [facility]  As needed (PRN)        Question:  Heparin Infusion Adjustment (DO NOT MODIFY ANSWER)  Answer:  [email or web address]\epic\Images\Pharmacy\HeparinInfusions\heparin LOW INTENSITY nomogram for [facility] [id number]    03/26/24 1904     heparin 25,000 units in dextrose 5% (100 units/ml) IV bolus from bag LOW INTENSITY nomogram - [facility]  As needed (PRN)        Question:  Heparin Infusion Adjustment (DO NOT MODIFY ANSWER)  Answer:  [email or web address]\epic\Images\Pharmacy\HeparinInfusions\heparin LOW INTENSITY nomogram for [facility] [id number]    03/26/24 1904     heparin 25,000 units in dextrose 5% (100 units/ml) IV bolus from bag LOW INTENSITY nomogram - [facility]  Once        Question:  Heparin Infusion Adjustment (DO NOT MODIFY ANSWER)  Answer:  [email or web address]\epic\Images\Pharmacy\HeparinInfusions\heparin LOW INTENSITY nomogram for [facility] [id number].pdf    03/26/24 1904     heparin 25,000 units in dextrose 5% 250 mL (100 units/mL) infusion LOW INTENSITY nomogram - [facility]  Continuous        Question:  Begin at (units/kg/hr)  Answer:  12 03/26/24 1904                    Vitaliy Brown MD  Cardiology  [facility]      Critical Care Time:  30 minutes     Critical care was time spent personally by me on the following activities: development of treatment plan with patient or surrogate and bedside caregivers, discussions with consultants, evaluation of patient's response to treatment, examination of patient, ordering and performing treatments and interventions, ordering and review of laboratory studies, ordering and review of radiographic studies, pulse oximetry, re-evaluation of patient's condition. This critical care time did not  overlap with that of any other provider or involve time for any procedures.

## 2024-03-27 NOTE — PLAN OF CARE
Patient remain in ICU, AAoX4, PCI done today, seath  removed@ 17:10 P M, free of fall, injury and skin breakdown in a shift. POC reviewed with patient expressed understanding.  Problem: Adult Inpatient Plan of Care  Goal: Plan of Care Review  Outcome: Ongoing, Progressing  Goal: Patient-Specific Goal (Individualized)  Outcome: Ongoing, Progressing  Goal: Absence of Hospital-Acquired Illness or Injury  Outcome: Ongoing, Progressing  Goal: Optimal Comfort and Wellbeing  Outcome: Ongoing, Progressing  Goal: Readiness for Transition of Care  Outcome: Ongoing, Progressing     Problem: Skin Injury Risk Increased  Goal: Skin Health and Integrity  Outcome: Ongoing, Progressing

## 2024-03-27 NOTE — SUBJECTIVE & OBJECTIVE
Interval History:  Was called in because patient having chest pains.  Patient states the pain is different than the pain she came in with.  This time this pain is on taking a deep inspiration.  EKG done which did not show any new ischemic changes.    Review of Systems   Constitutional: Negative.   HENT: Negative.     Eyes: Negative.    Cardiovascular:  Positive for chest pain.   Respiratory: Negative.     Endocrine: Negative.    Hematologic/Lymphatic: Negative.    Skin: Negative.    Musculoskeletal: Negative.    Gastrointestinal: Negative.    Genitourinary: Negative.    Neurological: Negative.    Psychiatric/Behavioral: Negative.     Allergic/Immunologic: Negative.      Objective:     Vital Signs (Most Recent):  Temp: 98.3 °F (36.8 °C) (03/26/24 1728)  Pulse: 62 (03/26/24 1830)  Resp: 18 (03/26/24 1830)  BP: (!) 145/65 (03/26/24 1800)  SpO2: 98 % (03/26/24 1830) Vital Signs (24h Range):  Temp:  [97.7 °F (36.5 °C)-98.7 °F (37.1 °C)] 98.3 °F (36.8 °C)  Pulse:  [57-89] 62  Resp:  [13-50] 18  SpO2:  [95 %-100 %] 98 %  BP: (131-157)/(60-96) 145/65     Weight: 67.6 kg (149 lb 0.5 oz)  Body mass index is 29.11 kg/m².     SpO2: 98 %       No intake or output data in the 24 hours ending 03/26/24 1911    Lines/Drains/Airways       Peripheral Intravenous Line  Duration                  Peripheral IV - Single Lumen 03/25/24 1111 20 G Right Antecubital 1 day         Peripheral IV - Single Lumen 03/25/24 1114 20 G Left Antecubital 1 day                       Physical Exam  Constitutional:       Appearance: Normal appearance. She is well-developed.   HENT:      Head: Normocephalic.   Eyes:      Pupils: Pupils are equal, round, and reactive to light.   Cardiovascular:      Rate and Rhythm: Normal rate and regular rhythm.   Pulmonary:      Effort: Pulmonary effort is normal.      Breath sounds: Normal breath sounds.   Abdominal:      General: Bowel sounds are normal.      Palpations: Abdomen is soft.      Tenderness: There is no  abdominal tenderness.   Musculoskeletal:         General: Normal range of motion.      Cervical back: Normal range of motion and neck supple.   Skin:     General: Skin is warm.   Neurological:      Mental Status: She is alert and oriented to person, place, and time.            Significant Labs:     DATA:     Laboratory:  CBC:  Recent Labs   Lab 03/25/24  1109 03/26/24  0440   WBC 9.50 9.73   Hemoglobin 16.0 14.5   Hematocrit 46.0 42.5   Platelets 306 253       CHEMISTRIES:  Recent Labs   Lab 01/26/23  1413 04/06/23  1021 05/17/23  1258 03/25/24  1109 03/26/24  0440   Glucose  --   --   --  110 105   Sodium 136 138 141 139 140   Potassium 4.3 3.7 3.9 3.2 L 3.9   BUN 17.9 16.7 20.1 H 9 14   Creatinine 0.77 0.76 0.70 0.9 0.9   eGFR  78 L 80 L 88 L  --   --    Calcium 9.6 9.1 8.8 10.3 9.7       CARDIAC BIOMARKERS:  Recent Labs   Lab 03/25/24  1109 03/25/24  2257 03/26/24  0748   Troponin I 0.128 H 0.136 H 0.242 H       COAGS:  Recent Labs   Lab 03/25/24  1124   INR 1.0       LIPIDS/LFTS:  Recent Labs   Lab 01/26/23  1413 03/25/24  1109 03/26/24  0440   AST 24 24 14   ALT 25 16 14       Hemoglobin A1C   Date Value Ref Range Status   12/11/2007 5.5 4.5 - 6.2 % Final     Hemoglobin A1c   Date Value Ref Range Status   03/25/2021 4.9 <5.7 % of total Hgb Final     Comment:     For the purpose of screening for the presence of  diabetes:    <5.7%       Consistent with the absence of diabetes  5.7-6.4%    Consistent with increased risk for diabetes              (prediabetes)  > or =6.5%  Consistent with diabetes    This assay result is consistent with a decreased risk  of diabetes.    Currently, no consensus exists regarding use of  hemoglobin A1c for diagnosis of diabetes in children.    According to American Diabetes Association (ADA)  guidelines, hemoglobin A1c <7.0% represents optimal  control in non-pregnant diabetic patients. Different  metrics may apply to specific patient populations.   Standards of  Medical Care in Diabetes(ADA).           TSH        The ASCVD Risk score (Zack VALENCIA, et al., 2019) failed to calculate for the following reasons:    The 2019 ASCVD risk score is only valid for ages 40 to 79    The patient has a prior MI or stroke diagnosis       BNP    Lab Results   Component Value Date/Time     (H) 03/25/2024 11:09 AM    BNP 85 04/29/2015 03:31 PM            ECHO    Results for orders placed during the hospital encounter of 03/25/24    Echo    Interpretation Summary    Left Ventricle: There is concentric remodeling. There is normal systolic function with a visually estimated ejection fraction of 55 - 60%. Grade I diastolic dysfunction.    Right Ventricle: Normal right ventricular cavity size. Systolic function is normal.    Left Atrium: Left atrium is mildly dilated.    Aortic Valve: There is mild aortic regurgitation.    Tricuspid Valve: There is mild regurgitation.    Pulmonary Artery: The estimated pulmonary artery systolic pressure is 24 mmHg.    IVC/SVC: Normal venous pressure at 3 mmHg.      STRESS TEST    No results found for this or any previous visit.        CATH    No results found for this or any previous visit.

## 2024-03-28 VITALS
RESPIRATION RATE: 19 BRPM | HEART RATE: 68 BPM | SYSTOLIC BLOOD PRESSURE: 121 MMHG | WEIGHT: 149.06 LBS | OXYGEN SATURATION: 100 % | TEMPERATURE: 97 F | BODY MASS INDEX: 29.26 KG/M2 | DIASTOLIC BLOOD PRESSURE: 58 MMHG | HEIGHT: 60 IN

## 2024-03-28 LAB
OHS QRS DURATION: 86 MS
OHS QRS DURATION: 90 MS
OHS QRS DURATION: 90 MS
OHS QTC CALCULATION: 453 MS
OHS QTC CALCULATION: 458 MS
OHS QTC CALCULATION: 462 MS
POCT GLUCOSE: 102 MG/DL (ref 70–110)
POCT GLUCOSE: 107 MG/DL (ref 70–110)
POCT GLUCOSE: 111 MG/DL (ref 70–110)

## 2024-03-28 PROCEDURE — 63600175 PHARM REV CODE 636 W HCPCS: Performed by: INTERNAL MEDICINE

## 2024-03-28 PROCEDURE — 99291 CRITICAL CARE FIRST HOUR: CPT | Mod: ICN,,, | Performed by: INTERNAL MEDICINE

## 2024-03-28 PROCEDURE — 25000003 PHARM REV CODE 250: Performed by: INTERNAL MEDICINE

## 2024-03-28 RX ORDER — COLCHICINE 0.6 MG/1
0.6 TABLET ORAL DAILY
Qty: 30 TABLET | Refills: 11 | Status: SHIPPED | OUTPATIENT
Start: 2024-03-29 | End: 2025-03-29

## 2024-03-28 RX ORDER — CLOPIDOGREL BISULFATE 75 MG/1
75 TABLET ORAL DAILY
Qty: 30 TABLET | Refills: 11 | Status: ON HOLD | OUTPATIENT
Start: 2024-03-29 | End: 2024-04-05

## 2024-03-28 RX ORDER — METOPROLOL SUCCINATE 50 MG/1
50 TABLET, EXTENDED RELEASE ORAL DAILY
Qty: 30 TABLET | Refills: 11 | Status: SHIPPED | OUTPATIENT
Start: 2024-03-29 | End: 2025-03-29

## 2024-03-28 RX ORDER — ISOSORBIDE MONONITRATE 30 MG/1
30 TABLET, EXTENDED RELEASE ORAL DAILY
Qty: 30 TABLET | Refills: 11 | Status: SHIPPED | OUTPATIENT
Start: 2024-03-29 | End: 2025-03-29

## 2024-03-28 RX ORDER — NITROGLYCERIN 0.4 MG/1
0.4 TABLET SUBLINGUAL EVERY 5 MIN PRN
Qty: 25 TABLET | Refills: 11 | Status: SHIPPED | OUTPATIENT
Start: 2024-03-28 | End: 2025-03-28

## 2024-03-28 RX ORDER — AMLODIPINE BESYLATE 5 MG/1
5 TABLET ORAL DAILY
Qty: 30 TABLET | Refills: 11 | Status: SHIPPED | OUTPATIENT
Start: 2024-03-29 | End: 2024-05-22 | Stop reason: SDUPTHER

## 2024-03-28 RX ADMIN — FENTANYL CITRATE 50 MCG: 50 INJECTION, SOLUTION INTRAMUSCULAR; INTRAVENOUS at 12:03

## 2024-03-28 RX ADMIN — MUPIROCIN: 20 OINTMENT TOPICAL at 09:03

## 2024-03-28 RX ADMIN — CLOPIDOGREL BISULFATE 75 MG: 75 TABLET ORAL at 09:03

## 2024-03-28 RX ADMIN — COLCHICINE 0.6 MG: 0.6 TABLET, FILM COATED ORAL at 09:03

## 2024-03-28 RX ADMIN — ISOSORBIDE MONONITRATE 30 MG: 30 TABLET, EXTENDED RELEASE ORAL at 09:03

## 2024-03-28 RX ADMIN — METOPROLOL SUCCINATE 50 MG: 50 TABLET, EXTENDED RELEASE ORAL at 09:03

## 2024-03-28 RX ADMIN — AMLODIPINE BESYLATE 5 MG: 5 TABLET ORAL at 09:03

## 2024-03-28 RX ADMIN — ASPIRIN 81 MG CHEWABLE TABLET 81 MG: 81 TABLET CHEWABLE at 09:03

## 2024-03-28 NOTE — ASSESSMENT & PLAN NOTE
Cath lab activated for inferior STEMI.  Aspirin Plavix taking emergently to cardiac catheterization lab    Risks, benefits and alternatives of the catheterization procedure were discussed with the patient.The risks of coronary angiography include but are not limited to: bleeding, infection, death, heart attack, arrhythmia, kidney injury or failure, potential need for dialysis, allergic reactions, stroke, need for emergency surgery, hematoma, pseudoaneurysm etc.  Should stenting be indicated, the patient has agreed to dual anti-platelet therapy for 1-consecutive year with a drug-eluting stent and a minimum of 1-month with the use of a bare metal stent. Additionally, pt is aware that non-compliance is likely to result in stent clotting with heart attack, heart failure, and/or death  The risks of moderate sedation include hypotension, respiratory depression, arrhythmias, bronchospasm, and death. Informed consent was obtained and the  patient is agreeable to proceed with the procedure. Consent was placed on the chart.      3/26:  Status post PCI of subtotal severe heavily calcified RCA yesterday.  Also has severe 95% stenosis in proximal circ.  Plan for PCI tomorrow of the circ lesion  Continue dual antiplatelet therapy    Risks, benefits and alternatives of the catheterization procedure were discussed with the patient.The risks of coronary angiography include but are not limited to: bleeding, infection, death, heart attack, arrhythmia, kidney injury or failure, potential need for dialysis, allergic reactions, stroke, need for emergency surgery, hematoma, pseudoaneurysm etc.  Should stenting be indicated, the patient has agreed to dual anti-platelet therapy for 1-consecutive year with a drug-eluting stent and a minimum of 1-month with the use of a bare metal stent. Additionally, pt is aware that non-compliance is likely to result in stent clotting with heart attack, heart failure, and/or death  The risks of moderate  sedation include hypotension, respiratory depression, arrhythmias, bronchospasm, and death. Informed consent was obtained and the  patient is agreeable to proceed with the procedure. Consent was placed on the chart.      3/27:  PCI of circumflex performed today.  Continue aspirin Plavix statins with goal LDL less than 70.

## 2024-03-28 NOTE — NURSING
[facility]  ICU Shift Summary  Date: 3/28/2024      Prehospitalization: Home  Admit Date / LOS : 3/25/2024/ 3 days    Diagnosis: STEMI (ST elevation myocardial infarction)    Consults:        Active: N/A       Needed: PT     Code Status: Full Code   Advanced Directive: <no information>    LDA:  Lines/Drains/Airways       Drain  Duration             Female External Urinary Catheter w/ Suction 03/27/24 0620 1 day              Peripheral Intravenous Line  Duration                  Peripheral IV - Single Lumen 03/25/24 1111 20 G Right Antecubital 2 days         Peripheral IV - Single Lumen 03/26/24 1935 20 G Anterior;Right Forearm 1 day                  Central Lines/Site/Justification:Patient Does Not Have Central Line  Urinary Cath/Order/Justification:Patient Does Not Have Urinary Catheter    Vasopressors/Infusions:    sodium chloride 0.9%            GOALS: Volume/ Hemodynamic: N/A                     RASS: N/A    Pain Management: IV       Pain Controlled: yes     Rhythm: NSR    Respiratory Device: Room Air         VTE Prophylaxis: Pharm  Mobility: A: Assist  Stress Ulcer Prophylaxis: No    Isolation: No active isolations    Dietary:   Current Diet Order   Procedures    Diet Cardiac      Tolerance: yes  Advancement: @ goal    I & O (24h):    Intake/Output Summary (Last 24 hours) at 3/28/2024 0642  Last data filed at 3/28/2024 0600  Gross per 24 hour   Intake 40.76 ml   Output 700 ml   Net -659.24 ml        Restraints: No    Significant Dates:  Post Op Date: N/A  Rescue Date: N/A  Imaging/ Diagnostics: N/A    Noteworthy Labs:  none    COVID Test: (--)  CBC/Anemia Labs: Coags:    Recent Labs   Lab 03/26/24 1912 03/27/24  0215   WBC 9.98 9.99   HGB 13.3 13.6   HCT 39.2 40.9    235   MCV 83 84   RDW 12.8 12.7    Recent Labs   Lab 03/25/24  1124 03/26/24 1912 03/26/24 1912 03/27/24  0215 03/27/24  0851   INR 1.0 1.0  --   --   --    APTT  --  31.7   < > 74.1* 44.6*    < > = values in this interval  "not displayed.        Chemistries:   Recent Labs   Lab 03/25/24  1109 03/26/24  0440 03/27/24  0624    140 137   K 3.2* 3.9 3.9    102 103   CO2 23 28 28   BUN 9 14 19   CREATININE 0.9 0.9 0.7   CALCIUM 10.3 9.7 9.2   PROT 7.6 6.3  --    BILITOT 0.8 0.7  --    ALKPHOS 102 91  --    ALT 16 14  --    AST 24 14  --         Cardiac Enzymes: Ejection Fractions:    Recent Labs     03/26/24  0748 03/26/24  1912   TROPONINI 0.242* 0.229*    No results found for: "EF"     POCT Glucose: HbA1c:    Recent Labs   Lab 03/27/24  0851 03/27/24  1309 03/27/24  1633   POCTGLUCOSE 130* 107 83    Hemoglobin A1C   Date Value Ref Range Status   12/11/2007 5.5 4.5 - 6.2 % Final     Hemoglobin A1c   Date Value Ref Range Status   03/25/2021 4.9 <5.7 % of total Hgb Final     Comment:     For the purpose of screening for the presence of  diabetes:    <5.7%       Consistent with the absence of diabetes  5.7-6.4%    Consistent with increased risk for diabetes              (prediabetes)  > or =6.5%  Consistent with diabetes    This assay result is consistent with a decreased risk  of diabetes.    Currently, no consensus exists regarding use of  hemoglobin A1c for diagnosis of diabetes in children.    According to American Diabetes Association (ADA)  guidelines, hemoglobin A1c <7.0% represents optimal  control in non-pregnant diabetic patients. Different  metrics may apply to specific patient populations.   Standards of Medical Care in Diabetes(ADA).               ICU LOS 2d 17h  Level of Care: Discharge    Chart Check: 12 HR Done  Shift Summary/Plan for the shift: NONE  "

## 2024-03-28 NOTE — SUBJECTIVE & OBJECTIVE
Interval History:  Patient doing fine.  Status post PCI of circumflex yesterday.    Review of Systems   Constitutional: Negative.   HENT: Negative.     Eyes: Negative.    Cardiovascular: Negative.    Respiratory: Negative.     Endocrine: Negative.    Hematologic/Lymphatic: Negative.    Skin: Negative.    Musculoskeletal: Negative.    Gastrointestinal: Negative.    Genitourinary: Negative.    Neurological: Negative.    Psychiatric/Behavioral: Negative.     Allergic/Immunologic: Negative.      Objective:     Vital Signs (Most Recent):  Temp: 98.5 °F (36.9 °C) (03/27/24 1501)  Pulse: 65 (03/27/24 1900)  Resp: (!) 30 (03/27/24 1900)  BP: (!) 147/66 (03/27/24 1900)  SpO2: 98 % (03/27/24 1900) Vital Signs (24h Range):  Temp:  [98.4 °F (36.9 °C)-98.8 °F (37.1 °C)] 98.5 °F (36.9 °C)  Pulse:  [60-89] 65  Resp:  [12-30] 30  SpO2:  [96 %-100 %] 98 %  BP: (108-167)/() 147/66  Arterial Line BP: (176-193)/(65-73) 178/65     Weight: 67.6 kg (149 lb 0.5 oz)  Body mass index is 29.11 kg/m².     SpO2: 98 %         Intake/Output Summary (Last 24 hours) at 3/27/2024 2000  Last data filed at 3/27/2024 1800  Gross per 24 hour   Intake 227.22 ml   Output 675 ml   Net -447.78 ml       Lines/Drains/Airways       Drain  Duration             Female External Urinary Catheter w/ Suction 03/27/24 0620 <1 day              Peripheral Intravenous Line  Duration                  Peripheral IV - Single Lumen 03/25/24 1111 20 G Right Antecubital 2 days         Peripheral IV - Single Lumen 03/26/24 1935 20 G Anterior;Right Forearm 1 day                       Physical Exam  Constitutional:       Appearance: Normal appearance. She is well-developed.   HENT:      Head: Normocephalic.   Eyes:      Pupils: Pupils are equal, round, and reactive to light.   Cardiovascular:      Rate and Rhythm: Normal rate and regular rhythm.   Pulmonary:      Effort: Pulmonary effort is normal.      Breath sounds: Normal breath sounds.   Abdominal:      General: Bowel  sounds are normal.      Palpations: Abdomen is soft.      Tenderness: There is no abdominal tenderness.   Musculoskeletal:         General: Normal range of motion.      Cervical back: Normal range of motion and neck supple.   Skin:     General: Skin is warm.   Neurological:      Mental Status: She is alert and oriented to person, place, and time.            Significant Labs:      DATA:     Laboratory:  CBC:  Recent Labs   Lab 03/26/24  0440 03/26/24  1912 03/27/24  0215   WBC 9.73 9.98 9.99   Hemoglobin 14.5 13.3 13.6   Hematocrit 42.5 39.2 40.9   Platelets 253 264 235       CHEMISTRIES:  Recent Labs   Lab 01/26/23  1413 04/06/23  1021 05/17/23  1258 03/25/24  1109 03/26/24  0440 03/27/24  0624   Glucose  --   --   --  110 105 117 H   Sodium 136 138 141 139 140 137   Potassium 4.3 3.7 3.9 3.2 L 3.9 3.9   BUN 17.9 16.7 20.1 H 9 14 19   Creatinine 0.77 0.76 0.70 0.9 0.9 0.7   eGFR  78 L 80 L 88 L  --   --   --    Calcium 9.6 9.1 8.8 10.3 9.7 9.2       CARDIAC BIOMARKERS:  Recent Labs   Lab 03/25/24  2257 03/26/24  0748 03/26/24  1912   Troponin I 0.136 H 0.242 H 0.229 H       COAGS:  Recent Labs   Lab 03/25/24  1124 03/26/24  1912   INR 1.0 1.0       LIPIDS/LFTS:  Recent Labs   Lab 01/26/23  1413 03/25/24  1109 03/26/24  0440   AST 24 24 14   ALT 25 16 14       Hemoglobin A1C   Date Value Ref Range Status   12/11/2007 5.5 4.5 - 6.2 % Final     Hemoglobin A1c   Date Value Ref Range Status   03/25/2021 4.9 <5.7 % of total Hgb Final     Comment:     For the purpose of screening for the presence of  diabetes:    <5.7%       Consistent with the absence of diabetes  5.7-6.4%    Consistent with increased risk for diabetes              (prediabetes)  > or =6.5%  Consistent with diabetes    This assay result is consistent with a decreased risk  of diabetes.    Currently, no consensus exists regarding use of  hemoglobin A1c for diagnosis of diabetes in children.    According to American Diabetes Association  (ADA)  guidelines, hemoglobin A1c <7.0% represents optimal  control in non-pregnant diabetic patients. Different  metrics may apply to specific patient populations.   Standards of Medical Care in Diabetes(ADA).           TSH        The ASCVD Risk score (Zack VALENCIA, et al., 2019) failed to calculate for the following reasons:    The 2019 ASCVD risk score is only valid for ages 40 to 79    The patient has a prior MI or stroke diagnosis       BNP    Lab Results   Component Value Date/Time     (H) 03/25/2024 11:09 AM    BNP 85 04/29/2015 03:31 PM            ECHO    Results for orders placed during the hospital encounter of 03/25/24    Echo    Interpretation Summary    Left Ventricle: There is concentric remodeling. There is normal systolic function with a visually estimated ejection fraction of 55 - 60%. Grade I diastolic dysfunction.    Right Ventricle: Normal right ventricular cavity size. Systolic function is normal.    Left Atrium: Left atrium is mildly dilated.    Aortic Valve: There is mild aortic regurgitation.    Tricuspid Valve: There is mild regurgitation.    Pulmonary Artery: The estimated pulmonary artery systolic pressure is 24 mmHg.    IVC/SVC: Normal venous pressure at 3 mmHg.      STRESS TEST    No results found for this or any previous visit.        CATH    Results for orders placed during the hospital encounter of 03/25/24    Cardiac catheterization    Conclusion    Successful IVUS guided PCI of prox to mid circ with COLTON x2 with excellent angiographic results.  IVUS post PCI revealed well-opposed and expanded stents.    The Prox Cx lesion was 95% stenosed with 0% stenosis post-intervention.    The Mid Cx lesion was 80% stenosed with 0% stenosis post-intervention.    Prox Cx lesion: A STENT SYNERGY XD [id number] stent was successfully placed at 12 ROCÍO for 15 sec.    Mid Cx lesion: A stent was successfully placed at 12 ROCÍO for 9 sec.    The estimated blood loss was <50 mL.    Coronary  angiogram    Left main:  No significant stenosis    Lad:  Mid to distal long 80% stenosis.  Considering the length of this lesion and the diffusely diseased LAD, consider medical management and PCI only if the patient continues to have lifestyle limiting angina despite maximum medical management    Circumflex:  Proximal 95% and mid 80% stenosis.  Successful PCI with COLTON x2    RCA: Previously placed RCA stents are widely patent.      Access:  Right common femoral artery access.  Remove sheath when ACT less than 160      Assessment and plan    Continue aspirin 81 mg daily indefinitely    Plavix 75 mg daily for at least 1 year and longer if no contraindication    Statins and aggressive risk factor modification                The procedure log was documented by Documenter: Golden Gamboa RN and verified by Vitaliy Brown MD.    Date: 3/27/2024  Time: 12:58 PM

## 2024-03-28 NOTE — NURSING
[facility], [facility]  Nurses Note -- 4 Eyes      3/28/2024       Skin assessed on: Q Shift      [x] No Pressure Injuries Present    [x]Prevention Measures Documented    [] Yes LDA  for Pressure Injury Previously documented     [] Yes New Pressure Injury Discovered   [] LDA for New Pressure Injury Added      Attending RN:  Ansley WEBB RN     Second RN:  KIRAN Willis

## 2024-03-28 NOTE — NURSING
[facility], [facility]  Nurses Note -- 4 Eyes      3/27/2024       Skin assessed on: Q Shift      [x] No Pressure Injuries Present    []Prevention Measures Documented    [] Yes LDA  for Pressure Injury Previously documented     [] Yes New Pressure Injury Discovered   [] LDA for New Pressure Injury Added      Attending RN:  Kasandra Tipton RN     Second RN:  KIRAN Panchal

## 2024-03-28 NOTE — SUBJECTIVE & OBJECTIVE
Interval History:  Doing fine.  No complications from angiogram yesterday      Review of Systems   Constitutional: Negative.   HENT: Negative.     Eyes: Negative.    Cardiovascular: Negative.    Respiratory: Negative.     Endocrine: Negative.    Hematologic/Lymphatic: Negative.    Skin: Negative.    Musculoskeletal: Negative.    Gastrointestinal: Negative.    Genitourinary: Negative.    Neurological: Negative.    Psychiatric/Behavioral: Negative.     Allergic/Immunologic: Negative.      Objective:     Vital Signs (Most Recent):  Temp: 97.4 °F (36.3 °C) (03/28/24 1101)  Pulse: 67 (03/28/24 1200)  Resp: (!) 23 (03/28/24 1200)  BP: (!) 125/59 (03/28/24 1200)  SpO2: 100 % (03/28/24 1200) Vital Signs (24h Range):  Temp:  [97.4 °F (36.3 °C)-98.9 °F (37.2 °C)] 97.4 °F (36.3 °C)  Pulse:  [60-76] 67  Resp:  [20-38] 23  SpO2:  [96 %-100 %] 100 %  BP: (119-169)/() 125/59  Arterial Line BP: (176-193)/(65-73) 178/65     Weight: 67.6 kg (149 lb 0.5 oz)  Body mass index is 29.11 kg/m².     SpO2: 100 %         Intake/Output Summary (Last 24 hours) at 3/28/2024 1353  Last data filed at 3/28/2024 0600  Gross per 24 hour   Intake --   Output 700 ml   Net -700 ml         Lines/Drains/Airways       Drain  Duration             Female External Urinary Catheter w/ Suction 03/27/24 0620 1 day              Peripheral Intravenous Line  Duration                  Peripheral IV - Single Lumen 03/25/24 1111 20 G Right Antecubital 3 days         Peripheral IV - Single Lumen 03/26/24 1935 20 G Anterior;Right Forearm 1 day                       Physical Exam  Constitutional:       Appearance: Normal appearance. She is well-developed.   HENT:      Head: Normocephalic.   Eyes:      Pupils: Pupils are equal, round, and reactive to light.   Cardiovascular:      Rate and Rhythm: Normal rate and regular rhythm.   Pulmonary:      Effort: Pulmonary effort is normal.      Breath sounds: Normal breath sounds.   Abdominal:      General: Bowel sounds are  normal.      Palpations: Abdomen is soft.      Tenderness: There is no abdominal tenderness.   Musculoskeletal:         General: Normal range of motion.      Cervical back: Normal range of motion and neck supple.   Skin:     General: Skin is warm.   Neurological:      Mental Status: She is alert and oriented to person, place, and time.            Significant Labs:      DATA:     Laboratory:  CBC:  Recent Labs   Lab 03/26/24  0440 03/26/24  1912 03/27/24  0215   WBC 9.73 9.98 9.99   Hemoglobin 14.5 13.3 13.6   Hematocrit 42.5 39.2 40.9   Platelets 253 264 235         CHEMISTRIES:  Recent Labs   Lab 01/26/23  1413 04/06/23  1021 05/17/23  1258 03/25/24  1109 03/26/24  0440 03/27/24  0624   Glucose  --   --   --  110 105 117 H   Sodium 136 138 141 139 140 137   Potassium 4.3 3.7 3.9 3.2 L 3.9 3.9   BUN 17.9 16.7 20.1 H 9 14 19   Creatinine 0.77 0.76 0.70 0.9 0.9 0.7   eGFR  78 L 80 L 88 L  --   --   --    Calcium 9.6 9.1 8.8 10.3 9.7 9.2         CARDIAC BIOMARKERS:  Recent Labs   Lab 03/25/24  2257 03/26/24  0748 03/26/24  1912   Troponin I 0.136 H 0.242 H 0.229 H         COAGS:  Recent Labs   Lab 03/25/24  1124 03/26/24  1912   INR 1.0 1.0         LIPIDS/LFTS:  Recent Labs   Lab 01/26/23  1413 03/25/24  1109 03/26/24  0440   AST 24 24 14   ALT 25 16 14         Hemoglobin A1C   Date Value Ref Range Status   12/11/2007 5.5 4.5 - 6.2 % Final     Hemoglobin A1c   Date Value Ref Range Status   03/25/2021 4.9 <5.7 % of total Hgb Final     Comment:     For the purpose of screening for the presence of  diabetes:    <5.7%       Consistent with the absence of diabetes  5.7-6.4%    Consistent with increased risk for diabetes              (prediabetes)  > or =6.5%  Consistent with diabetes    This assay result is consistent with a decreased risk  of diabetes.    Currently, no consensus exists regarding use of  hemoglobin A1c for diagnosis of diabetes in children.    According to American Diabetes Association  (ADA)  guidelines, hemoglobin A1c <7.0% represents optimal  control in non-pregnant diabetic patients. Different  metrics may apply to specific patient populations.   Standards of Medical Care in Diabetes(ADA).           TSH        The ASCVD Risk score (Zack VALENCIA, et al., 2019) failed to calculate for the following reasons:    The 2019 ASCVD risk score is only valid for ages 40 to 79    The patient has a prior MI or stroke diagnosis       BNP    Lab Results   Component Value Date/Time     (H) 03/25/2024 11:09 AM    BNP 85 04/29/2015 03:31 PM            ECHO    Results for orders placed during the hospital encounter of 03/25/24    Echo    Interpretation Summary    Left Ventricle: There is concentric remodeling. There is normal systolic function with a visually estimated ejection fraction of 55 - 60%. Grade I diastolic dysfunction.    Right Ventricle: Normal right ventricular cavity size. Systolic function is normal.    Left Atrium: Left atrium is mildly dilated.    Aortic Valve: There is mild aortic regurgitation.    Tricuspid Valve: There is mild regurgitation.    Pulmonary Artery: The estimated pulmonary artery systolic pressure is 24 mmHg.    IVC/SVC: Normal venous pressure at 3 mmHg.      STRESS TEST    No results found for this or any previous visit.        CATH    Results for orders placed during the hospital encounter of 03/25/24    Cardiac catheterization    Conclusion    Successful IVUS guided PCI of prox to mid circ with COLTON x2 with excellent angiographic results.  IVUS post PCI revealed well-opposed and expanded stents.    The Prox Cx lesion was 95% stenosed with 0% stenosis post-intervention.    The Mid Cx lesion was 80% stenosed with 0% stenosis post-intervention.    Prox Cx lesion: A STENT SYNERGY XD 3.0X24MM stent was successfully placed at 12 ROCÍO for 15 sec.    Mid Cx lesion: A stent was successfully placed at 12 ROCÍO for 9 sec.    The estimated blood loss was <50 mL.    Coronary  angiogram    Left main:  No significant stenosis    Lad:  Mid to distal long 80% stenosis.  Considering the length of this lesion and the diffusely diseased LAD, consider medical management and PCI only if the patient continues to have lifestyle limiting angina despite maximum medical management    Circumflex:  Proximal 95% and mid 80% stenosis.  Successful PCI with COLTON x2    RCA: Previously placed RCA stents are widely patent.      Access:  Right common femoral artery access.  Remove sheath when ACT less than 160      Assessment and plan    Continue aspirin 81 mg daily indefinitely    Plavix 75 mg daily for at least 1 year and longer if no contraindication    Statins and aggressive risk factor modification                The procedure log was documented by Documenter: Golden Gamboa RN and verified by Vitaliy Brown MD.    Date: 3/27/2024  Time: 12:58 PM

## 2024-03-28 NOTE — ASSESSMENT & PLAN NOTE
Cath lab activated for inferior STEMI.  Aspirin Plavix taking emergently to cardiac catheterization lab    Risks, benefits and alternatives of the catheterization procedure were discussed with the patient.The risks of coronary angiography include but are not limited to: bleeding, infection, death, heart attack, arrhythmia, kidney injury or failure, potential need for dialysis, allergic reactions, stroke, need for emergency surgery, hematoma, pseudoaneurysm etc.  Should stenting be indicated, the patient has agreed to dual anti-platelet therapy for 1-consecutive year with a drug-eluting stent and a minimum of 1-month with the use of a bare metal stent. Additionally, pt is aware that non-compliance is likely to result in stent clotting with heart attack, heart failure, and/or death  The risks of moderate sedation include hypotension, respiratory depression, arrhythmias, bronchospasm, and death. Informed consent was obtained and the  patient is agreeable to proceed with the procedure. Consent was placed on the chart.      3/26:  Status post PCI of subtotal severe heavily calcified RCA yesterday.  Also has severe 95% stenosis in proximal circ.  Plan for PCI tomorrow of the circ lesion  Continue dual antiplatelet therapy    Risks, benefits and alternatives of the catheterization procedure were discussed with the patient.The risks of coronary angiography include but are not limited to: bleeding, infection, death, heart attack, arrhythmia, kidney injury or failure, potential need for dialysis, allergic reactions, stroke, need for emergency surgery, hematoma, pseudoaneurysm etc.  Should stenting be indicated, the patient has agreed to dual anti-platelet therapy for 1-consecutive year with a drug-eluting stent and a minimum of 1-month with the use of a bare metal stent. Additionally, pt is aware that non-compliance is likely to result in stent clotting with heart attack, heart failure, and/or death  The risks of moderate  sedation include hypotension, respiratory depression, arrhythmias, bronchospasm, and death. Informed consent was obtained and the  patient is agreeable to proceed with the procedure. Consent was placed on the chart.      3/27:  PCI of circumflex performed today.  Continue aspirin Plavix statins with goal LDL less than 70.    3/28:  Doing fine.  Continue aspirin Plavix.  Statins.  No complications from PCI yesterday.  Angina free

## 2024-03-28 NOTE — PLAN OF CARE
Case Management Final Discharge Note      Discharge Disposition: Home    New DME ordered / company name: N/A    Relevant SDOH / Transition of Care Barriers:  None    Scheduled followup appointment: On AVS    Referrals placed: N/A    Transportation: Private vehicle        Patient and family educated on discharge services and updated on DC plan. Bedside RN notified, patient clear to discharge from Case Management Perspective.    03/28/24 6856   Final Note   Assessment Type Final Discharge Note   Anticipated Discharge Disposition Home   Hospital Resources/Appts/Education Provided Appointments scheduled and added to AVS  (Message sent to Dr. Brown's office to call pt if he wants to see her sooner.)   Post-Acute Status   Discharge Delays None known at this time

## 2024-03-28 NOTE — PROGRESS NOTES
[facility] [place] Intensive Care  Cardiology  Progress Note    Patient Name: Elizabeth Ayala  MRN: [id number]  Admission Date: 3/25/2024  Hospital Length of Stay: 2 days  Code Status: Full Code   Attending Physician: Vitaliy Brown MD   Primary Care Physician: Amna Levy MD  Expected Discharge Date:   Principal Problem:STEMI (ST elevation myocardial infarction)    Subjective:       Interval History:  Patient doing fine.  Status post PCI of circumflex yesterday.    Review of Systems   Constitutional: Negative.   HENT: Negative.     Eyes: Negative.    Cardiovascular: Negative.    Respiratory: Negative.     Endocrine: Negative.    Hematologic/Lymphatic: Negative.    Skin: Negative.    Musculoskeletal: Negative.    Gastrointestinal: Negative.    Genitourinary: Negative.    Neurological: Negative.    Psychiatric/Behavioral: Negative.     Allergic/Immunologic: Negative.      Objective:     Vital Signs (Most Recent):  Temp: 98.5 °F (36.9 °C) (03/27/24 1501)  Pulse: 65 (03/27/24 1900)  Resp: (!) 30 (03/27/24 1900)  BP: (!) 147/66 (03/27/24 1900)  SpO2: 98 % (03/27/24 1900) Vital Signs (24h Range):  Temp:  [98.4 °F (36.9 °C)-98.8 °F (37.1 °C)] 98.5 °F (36.9 °C)  Pulse:  [60-89] 65  Resp:  [12-30] 30  SpO2:  [96 %-100 %] 98 %  BP: (108-167)/() 147/66  Arterial Line BP: (176-193)/(65-73) 178/65     Weight: 67.6 kg (149 lb 0.5 oz)  Body mass index is 29.11 kg/m².     SpO2: 98 %         Intake/Output Summary (Last 24 hours) at 3/27/2024 2000  Last data filed at 3/27/2024 1800  Gross per 24 hour   Intake 227.22 ml   Output 675 ml   Net -447.78 ml       Lines/Drains/Airways       Drain  Duration             Female External Urinary Catheter w/ Suction 03/27/24 0620 <1 day              Peripheral Intravenous Line  Duration                  Peripheral IV - Single Lumen 03/25/24 1111 20 G Right Antecubital 2 days         Peripheral IV - Single Lumen 03/26/24 1935 20 G Anterior;Right Forearm 1 day                       Physical  Exam  Constitutional:       Appearance: Normal appearance. She is well-developed.   HENT:      Head: Normocephalic.   Eyes:      Pupils: Pupils are equal, round, and reactive to light.   Cardiovascular:      Rate and Rhythm: Normal rate and regular rhythm.   Pulmonary:      Effort: Pulmonary effort is normal.      Breath sounds: Normal breath sounds.   Abdominal:      General: Bowel sounds are normal.      Palpations: Abdomen is soft.      Tenderness: There is no abdominal tenderness.   Musculoskeletal:         General: Normal range of motion.      Cervical back: Normal range of motion and neck supple.   Skin:     General: Skin is warm.   Neurological:      Mental Status: She is alert and oriented to person, place, and time.            Significant Labs:      DATA:     Laboratory:  CBC:  Recent Labs   Lab 03/26/24  0440 03/26/24 1912 03/27/24  0215   WBC 9.73 9.98 9.99   Hemoglobin 14.5 13.3 13.6   Hematocrit 42.5 39.2 40.9   Platelets 253 264 235       CHEMISTRIES:  Recent Labs   Lab 01/26/23  1413 04/06/23  1021 05/17/23  1258 03/25/24  1109 03/26/24  0440 03/27/24  0624   Glucose  --   --   --  110 105 117 H   Sodium 136 138 141 139 140 137   Potassium 4.3 3.7 3.9 3.2 L 3.9 3.9   BUN 17.9 16.7 20.1 H 9 14 19   Creatinine 0.77 0.76 0.70 0.9 0.9 0.7   eGFR  78 L 80 L 88 L  --   --   --    Calcium 9.6 9.1 8.8 10.3 9.7 9.2       CARDIAC BIOMARKERS:  Recent Labs   Lab 03/25/24  2257 03/26/24  0748 03/26/24 1912   Troponin I 0.136 H 0.242 H 0.229 H       COAGS:  Recent Labs   Lab 03/25/24  1124 03/26/24 1912   INR 1.0 1.0       LIPIDS/LFTS:  Recent Labs   Lab 01/26/23  1413 03/25/24  1109 03/26/24  0440   AST 24 24 14   ALT 25 16 14       Hemoglobin A1C   Date Value Ref Range Status   12/11/2007 5.5 4.5 - 6.2 % Final     Hemoglobin A1c   Date Value Ref Range Status   03/25/2021 4.9 <5.7 % of total Hgb Final     Comment:     For the purpose of screening for the presence of  diabetes:    <5.7%        Consistent with the absence of diabetes  5.7-6.4%    Consistent with increased risk for diabetes              (prediabetes)  > or =6.5%  Consistent with diabetes    This assay result is consistent with a decreased risk  of diabetes.    Currently, no consensus exists regarding use of  hemoglobin A1c for diagnosis of diabetes in children.    According to American Diabetes Association (ADA)  guidelines, hemoglobin A1c <7.0% represents optimal  control in non-pregnant diabetic patients. Different  metrics may apply to specific patient populations.   Standards of Medical Care in Diabetes(ADA).           TSH        The ASCVD Risk score (Zack VALENCIA, et al., 2019) failed to calculate for the following reasons:    The 2019 ASCVD risk score is only valid for ages 40 to 79    The patient has a prior MI or stroke diagnosis       BNP    Lab Results   Component Value Date/Time     (H) 03/25/2024 11:09 AM    BNP 85 04/29/2015 03:31 PM            ECHO    Results for orders placed during the hospital encounter of 03/25/24    Echo    Interpretation Summary    Left Ventricle: There is concentric remodeling. There is normal systolic function with a visually estimated ejection fraction of 55 - 60%. Grade I diastolic dysfunction.    Right Ventricle: Normal right ventricular cavity size. Systolic function is normal.    Left Atrium: Left atrium is mildly dilated.    Aortic Valve: There is mild aortic regurgitation.    Tricuspid Valve: There is mild regurgitation.    Pulmonary Artery: The estimated pulmonary artery systolic pressure is 24 mmHg.    IVC/SVC: Normal venous pressure at 3 mmHg.      STRESS TEST    No results found for this or any previous visit.        CATH    Results for orders placed during the hospital encounter of 03/25/24    Cardiac catheterization    Conclusion    Successful IVUS guided PCI of prox to mid circ with COLTON x2 with excellent angiographic results.  IVUS post PCI revealed well-opposed and expanded stents.     The Prox Cx lesion was 95% stenosed with 0% stenosis post-intervention.    The Mid Cx lesion was 80% stenosed with 0% stenosis post-intervention.    Prox Cx lesion: A STENT SYNERGY XD [id number] stent was successfully placed at 12 ROCÍO for 15 sec.    Mid Cx lesion: A stent was successfully placed at 12 ROCÍO for 9 sec.    The estimated blood loss was <50 mL.    Coronary angiogram    Left main:  No significant stenosis    Lad:  Mid to distal long 80% stenosis.  Considering the length of this lesion and the diffusely diseased LAD, consider medical management and PCI only if the patient continues to have lifestyle limiting angina despite maximum medical management    Circumflex:  Proximal 95% and mid 80% stenosis.  Successful PCI with COLTON x2    RCA: Previously placed RCA stents are widely patent.      Access:  Right common femoral artery access.  Remove sheath when ACT less than 160      Assessment and plan    Continue aspirin 81 mg daily indefinitely    Plavix 75 mg daily for at least 1 year and longer if no contraindication    Statins and aggressive risk factor modification                The procedure log was documented by Documenter: Golden Gamboa RN and verified by Vitaliy Brown MD.    Date: 3/27/2024  Time: 12:58 PM      Assessment and Plan:     Brief HPI:     * STEMI (ST elevation myocardial infarction)  Cath lab activated for inferior STEMI.  Aspirin Plavix taking emergently to cardiac catheterization lab    Risks, benefits and alternatives of the catheterization procedure were discussed with the patient.The risks of coronary angiography include but are not limited to: bleeding, infection, death, heart attack, arrhythmia, kidney injury or failure, potential need for dialysis, allergic reactions, stroke, need for emergency surgery, hematoma, pseudoaneurysm etc.  Should stenting be indicated, the patient has agreed to dual anti-platelet therapy for 1-consecutive year with a drug-eluting stent and a minimum of  1-month with the use of a bare metal stent. Additionally, pt is aware that non-compliance is likely to result in stent clotting with heart attack, heart failure, and/or death  The risks of moderate sedation include hypotension, respiratory depression, arrhythmias, bronchospasm, and death. Informed consent was obtained and the  patient is agreeable to proceed with the procedure. Consent was placed on the chart.      3/26:  Status post PCI of subtotal severe heavily calcified RCA yesterday.  Also has severe 95% stenosis in proximal circ.  Plan for PCI tomorrow of the circ lesion  Continue dual antiplatelet therapy    Risks, benefits and alternatives of the catheterization procedure were discussed with the patient.The risks of coronary angiography include but are not limited to: bleeding, infection, death, heart attack, arrhythmia, kidney injury or failure, potential need for dialysis, allergic reactions, stroke, need for emergency surgery, hematoma, pseudoaneurysm etc.  Should stenting be indicated, the patient has agreed to dual anti-platelet therapy for 1-consecutive year with a drug-eluting stent and a minimum of 1-month with the use of a bare metal stent. Additionally, pt is aware that non-compliance is likely to result in stent clotting with heart attack, heart failure, and/or death  The risks of moderate sedation include hypotension, respiratory depression, arrhythmias, bronchospasm, and death. Informed consent was obtained and the  patient is agreeable to proceed with the procedure. Consent was placed on the chart.      3/27:  PCI of circumflex performed today.  Continue aspirin Plavix statins with goal LDL less than 70.      Dressler's syndrome  Pericarditis post MI. initiate colchicine.      CAD (coronary artery disease)  Patient with known CAD s/p stent placement, which is uncontrolled Will continue ASA, Plavix, and Statin and monitor for S/Sx of angina/ACS.         VTE Risk Mitigation (From admission,  onward)      None            Vitaliy Brown MD  Cardiology  [facility] [place] - Intensive Care    Critical Care Time:  35 minutes     Critical care was time spent personally by me on the following activities: development of treatment plan with patient or surrogate and bedside caregivers, discussions with consultants, evaluation of patient's response to treatment, examination of patient, ordering and performing treatments and interventions, ordering and review of laboratory studies, ordering and review of radiographic studies, pulse oximetry, re-evaluation of patient's condition. This critical care time did not overlap with that of any other provider or involve time for any procedures.

## 2024-03-28 NOTE — NURSING
Patient discharged home @ 15:05, discharge education given to patient and family expressed understanding, DC papers given to patient , IV'S removed.

## 2024-03-28 NOTE — PROGRESS NOTES
[place] - Intensive Care  Cardiology  Progress Note    Patient Name: Elizabeth Ayala  MRN: [id number]  Admission Date: 3/25/2024  Hospital Length of Stay: 3 days  Code Status: Full Code   Attending Physician: Vitaliy Brown MD   Primary Care Physician: Amna Levy MD  Expected Discharge Date:   Principal Problem:STEMI (ST elevation myocardial infarction)    Subjective:       Interval History:  Doing fine.  No complications from angiogram yesterday      Review of Systems   Constitutional: Negative.   HENT: Negative.     Eyes: Negative.    Cardiovascular: Negative.    Respiratory: Negative.     Endocrine: Negative.    Hematologic/Lymphatic: Negative.    Skin: Negative.    Musculoskeletal: Negative.    Gastrointestinal: Negative.    Genitourinary: Negative.    Neurological: Negative.    Psychiatric/Behavioral: Negative.     Allergic/Immunologic: Negative.      Objective:     Vital Signs (Most Recent):  Temp: 97.4 °F (36.3 °C) (03/28/24 1101)  Pulse: 67 (03/28/24 1200)  Resp: (!) 23 (03/28/24 1200)  BP: (!) 125/59 (03/28/24 1200)  SpO2: 100 % (03/28/24 1200) Vital Signs (24h Range):  Temp:  [97.4 °F (36.3 °C)-98.9 °F (37.2 °C)] 97.4 °F (36.3 °C)  Pulse:  [60-76] 67  Resp:  [20-38] 23  SpO2:  [96 %-100 %] 100 %  BP: (119-169)/() 125/59  Arterial Line BP: (176-193)/(65-73) 178/65     Weight: 67.6 kg (149 lb 0.5 oz)  Body mass index is 29.11 kg/m².     SpO2: 100 %         Intake/Output Summary (Last 24 hours) at 3/28/2024 1353  Last data filed at 3/28/2024 0600  Gross per 24 hour   Intake --   Output 700 ml   Net -700 ml         Lines/Drains/Airways       Drain  Duration             Female External Urinary Catheter w/ Suction 03/27/24 0620 1 day              Peripheral Intravenous Line  Duration                  Peripheral IV - Single Lumen 03/25/24 1111 20 G Right Antecubital 3 days         Peripheral IV - Single Lumen 03/26/24 1935 20 G Anterior;Right Forearm 1 day                       Physical  Exam  Constitutional:       Appearance: Normal appearance. She is well-developed.   HENT:      Head: Normocephalic.   Eyes:      Pupils: Pupils are equal, round, and reactive to light.   Cardiovascular:      Rate and Rhythm: Normal rate and regular rhythm.   Pulmonary:      Effort: Pulmonary effort is normal.      Breath sounds: Normal breath sounds.   Abdominal:      General: Bowel sounds are normal.      Palpations: Abdomen is soft.      Tenderness: There is no abdominal tenderness.   Musculoskeletal:         General: Normal range of motion.      Cervical back: Normal range of motion and neck supple.   Skin:     General: Skin is warm.   Neurological:      Mental Status: She is alert and oriented to person, place, and time.            Significant Labs:      DATA:     Laboratory:  CBC:  Recent Labs   Lab 03/26/24  0440 03/26/24 1912 03/27/24  0215   WBC 9.73 9.98 9.99   Hemoglobin 14.5 13.3 13.6   Hematocrit 42.5 39.2 40.9   Platelets 253 264 235         CHEMISTRIES:  Recent Labs   Lab 01/26/23  1413 04/06/23  1021 05/17/23  1258 03/25/24  1109 03/26/24  0440 03/27/24  0624   Glucose  --   --   --  110 105 117 H   Sodium 136 138 141 139 140 137   Potassium 4.3 3.7 3.9 3.2 L 3.9 3.9   BUN 17.9 16.7 20.1 H 9 14 19   Creatinine 0.77 0.76 0.70 0.9 0.9 0.7   eGFR  78 L 80 L 88 L  --   --   --    Calcium 9.6 9.1 8.8 10.3 9.7 9.2         CARDIAC BIOMARKERS:  Recent Labs   Lab 03/25/24  2257 03/26/24  0748 03/26/24 1912   Troponin I 0.136 H 0.242 H 0.229 H         COAGS:  Recent Labs   Lab 03/25/24  1124 03/26/24 1912   INR 1.0 1.0         LIPIDS/LFTS:  Recent Labs   Lab 01/26/23  1413 03/25/24  1109 03/26/24  0440   AST 24 24 14   ALT 25 16 14         Hemoglobin A1C   Date Value Ref Range Status   12/11/2007 5.5 4.5 - 6.2 % Final     Hemoglobin A1c   Date Value Ref Range Status   03/25/2021 4.9 <5.7 % of total Hgb Final     Comment:     For the purpose of screening for the presence of  diabetes:    <5.7%        Consistent with the absence of diabetes  5.7-6.4%    Consistent with increased risk for diabetes              (prediabetes)  > or =6.5%  Consistent with diabetes    This assay result is consistent with a decreased risk  of diabetes.    Currently, no consensus exists regarding use of  hemoglobin A1c for diagnosis of diabetes in children.    According to American Diabetes Association (ADA)  guidelines, hemoglobin A1c <7.0% represents optimal  control in non-pregnant diabetic patients. Different  metrics may apply to specific patient populations.   Standards of Medical Care in Diabetes(ADA).           TSH        The ASCVD Risk score (Zack VALENCIA, et al., 2019) failed to calculate for the following reasons:    The 2019 ASCVD risk score is only valid for ages 40 to 79    The patient has a prior MI or stroke diagnosis       BNP    Lab Results   Component Value Date/Time     (H) 03/25/2024 11:09 AM    BNP 85 04/29/2015 03:31 PM            ECHO    Results for orders placed during the hospital encounter of 03/25/24    Echo    Interpretation Summary    Left Ventricle: There is concentric remodeling. There is normal systolic function with a visually estimated ejection fraction of 55 - 60%. Grade I diastolic dysfunction.    Right Ventricle: Normal right ventricular cavity size. Systolic function is normal.    Left Atrium: Left atrium is mildly dilated.    Aortic Valve: There is mild aortic regurgitation.    Tricuspid Valve: There is mild regurgitation.    Pulmonary Artery: The estimated pulmonary artery systolic pressure is 24 mmHg.    IVC/SVC: Normal venous pressure at 3 mmHg.      STRESS TEST    No results found for this or any previous visit.        CATH    Results for orders placed during the hospital encounter of 03/25/24    Cardiac catheterization    Conclusion    Successful IVUS guided PCI of prox to mid circ with COLTON x2 with excellent angiographic results.  IVUS post PCI revealed well-opposed and expanded  stents.    The Prox Cx lesion was 95% stenosed with 0% stenosis post-intervention.    The Mid Cx lesion was 80% stenosed with 0% stenosis post-intervention.    Prox Cx lesion: A STENT SYNERGY XD 3.0X24MM stent was successfully placed at 12 ROCÍO for 15 sec.    Mid Cx lesion: A stent was successfully placed at 12 ROCÍO for 9 sec.    The estimated blood loss was <50 mL.    Coronary angiogram    Left main:  No significant stenosis    Lad:  Mid to distal long 80% stenosis.  Considering the length of this lesion and the diffusely diseased LAD, consider medical management and PCI only if the patient continues to have lifestyle limiting angina despite maximum medical management    Circumflex:  Proximal 95% and mid 80% stenosis.  Successful PCI with COLTON x2    RCA: Previously placed RCA stents are widely patent.      Access:  Right common femoral artery access.  Remove sheath when ACT less than 160      Assessment and plan    Continue aspirin 81 mg daily indefinitely    Plavix 75 mg daily for at least 1 year and longer if no contraindication    Statins and aggressive risk factor modification                The procedure log was documented by Documenter: Golden Gamboa RN and verified by Vitaliy Brown MD.    Date: 3/27/2024  Time: 12:58 PM    Assessment and Plan:     Brief HPI:     * STEMI (ST elevation myocardial infarction)  Cath lab activated for inferior STEMI.  Aspirin Plavix taking emergently to cardiac catheterization lab    Risks, benefits and alternatives of the catheterization procedure were discussed with the patient.The risks of coronary angiography include but are not limited to: bleeding, infection, death, heart attack, arrhythmia, kidney injury or failure, potential need for dialysis, allergic reactions, stroke, need for emergency surgery, hematoma, pseudoaneurysm etc.  Should stenting be indicated, the patient has agreed to dual anti-platelet therapy for 1-consecutive year with a drug-eluting stent and a minimum  of 1-month with the use of a bare metal stent. Additionally, pt is aware that non-compliance is likely to result in stent clotting with heart attack, heart failure, and/or death  The risks of moderate sedation include hypotension, respiratory depression, arrhythmias, bronchospasm, and death. Informed consent was obtained and the  patient is agreeable to proceed with the procedure. Consent was placed on the chart.      3/26:  Status post PCI of subtotal severe heavily calcified RCA yesterday.  Also has severe 95% stenosis in proximal circ.  Plan for PCI tomorrow of the circ lesion  Continue dual antiplatelet therapy    Risks, benefits and alternatives of the catheterization procedure were discussed with the patient.The risks of coronary angiography include but are not limited to: bleeding, infection, death, heart attack, arrhythmia, kidney injury or failure, potential need for dialysis, allergic reactions, stroke, need for emergency surgery, hematoma, pseudoaneurysm etc.  Should stenting be indicated, the patient has agreed to dual anti-platelet therapy for 1-consecutive year with a drug-eluting stent and a minimum of 1-month with the use of a bare metal stent. Additionally, pt is aware that non-compliance is likely to result in stent clotting with heart attack, heart failure, and/or death  The risks of moderate sedation include hypotension, respiratory depression, arrhythmias, bronchospasm, and death. Informed consent was obtained and the  patient is agreeable to proceed with the procedure. Consent was placed on the chart.      3/27:  PCI of circumflex performed today.  Continue aspirin Plavix statins with goal LDL less than 70.    3/28:  Doing fine.  Continue aspirin Plavix.  Statins.  No complications from PCI yesterday.  Angina free      Dressler's syndrome  Pericarditis post MI. initiate colchicine.      CAD (coronary artery disease)  Patient with known CAD s/p stent placement, which is uncontrolled Will  continue ASA, Plavix, and Statin and monitor for S/Sx of angina/ACS.         VTE Risk Mitigation (From admission, onward)           Ordered     Place sequential compression device  Until discontinued         03/28/24 0933                    Vitaliy Brown MD  Cardiology  [facility] [place] - Intensive Care      Critical Care Time: 35 minutes     Critical care was time spent personally by me on the following activities: development of treatment plan with patient or surrogate and bedside caregivers, discussions with consultants, evaluation of patient's response to treatment, examination of patient, ordering and performing treatments and interventions, ordering and review of laboratory studies, ordering and review of radiographic studies, pulse oximetry, re-evaluation of patient's condition. This critical care time did not overlap with that of any other provider or involve time for any procedures.

## 2024-04-02 NOTE — DISCHARGE SUMMARY
Diagnosis: STEMI         [facility] Intensive Care  Cardiology  Progress Note     Patient Name: Elizabeth Ayala  MRN: [id number]  Admission Date: 3/25/2024  Hospital Length of Stay: 3 days  Code Status: Full Code   Attending Physician: Vitaliy Brown MD   Primary Care Physician: Amna Levy MD  Expected Discharge Date:   Principal Problem:STEMI (ST elevation myocardial infarction)     Subjective:       HPI:   Patient is a pleasant 78-year-old lady.  Follows with Dr. Kemp.  Came in with chest pain since yesterday.  Has a history of old inferior MI with RCA stent.  Denies orthopnea PND.  Still having ongoing chest pain, although states is getting slightly better.        1. Chest tightness and shortness of breath on exertion since January 2020  2. 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 Abnormal EKG  4. Coronary disease  5. Hypertension  6. Increased BMI  7. High cholesterol  8. History of fatty liver  9. History of GERD  10. History of diarrhea  11. History of elevated LFTs  12. History of degenerative disc disease cervical  13. Severe sleep apnea  14. Soft left carotid bruit  15. Dizziness  Interval History:  Doing fine.  No complications from angiogram yesterday        Review of Systems   Constitutional: Negative.   HENT: Negative.     Eyes: Negative.    Cardiovascular: Negative.    Respiratory: Negative.     Endocrine: Negative.    Hematologic/Lymphatic: Negative.    Skin: Negative.    Musculoskeletal: Negative.    Gastrointestinal: Negative.    Genitourinary: Negative.    Neurological: Negative.    Psychiatric/Behavioral: Negative.     Allergic/Immunologic: Negative.       Objective:      Vital Signs (Most Recent):  Temp: 97.4 °F (36.3 °C) (03/28/24 1101)  Pulse: 67 (03/28/24 1200)  Resp: (!) 23 (03/28/24 1200)  BP: (!) 125/59 (03/28/24 1200)  SpO2: 100 % (03/28/24 1200) Vital Signs (24h Range):  Temp:  [97.4 °F (36.3 °C)-98.9 °F (37.2 °C)] 97.4 °F (36.3  °C)  Pulse:  [60-76] 67  Resp:  [20-38] 23  SpO2:  [96 %-100 %] 100 %  BP: (119-169)/() 125/59  Arterial Line BP: (176-193)/(65-73) 178/65      Weight: 67.6 kg (149 lb 0.5 oz)  Body mass index is 29.11 kg/m².     SpO2: 100 %           Intake/Output Summary (Last 24 hours) at 3/28/2024 1353  Last data filed at 3/28/2024 0600      Gross per 24 hour   Intake --   Output 700 ml   Net -700 ml            Lines/Drains/Airways         Drain  Duration                Female External Urinary Catheter w/ Suction 03/27/24 0620 1 day                  Peripheral Intravenous Line  Duration                     Peripheral IV - Single Lumen 03/25/24 1111 20 G Right Antecubital 3 days          Peripheral IV - Single Lumen 03/26/24 1935 20 G Anterior;Right Forearm 1 day                             Physical Exam  Constitutional:       Appearance: Normal appearance. She is well-developed.   HENT:      Head: Normocephalic.   Eyes:      Pupils: Pupils are equal, round, and reactive to light.   Cardiovascular:      Rate and Rhythm: Normal rate and regular rhythm.   Pulmonary:      Effort: Pulmonary effort is normal.      Breath sounds: Normal breath sounds.   Abdominal:      General: Bowel sounds are normal.      Palpations: Abdomen is soft.      Tenderness: There is no abdominal tenderness.   Musculoskeletal:         General: Normal range of motion.      Cervical back: Normal range of motion and neck supple.   Skin:     General: Skin is warm.   Neurological:      Mental Status: She is alert and oriented to person, place, and time.               Significant Labs:       DATA:      Laboratory:  CBC:        Recent Labs   Lab 03/26/24  0440 03/26/24  1912 03/27/24  0215   WBC 9.73 9.98 9.99   Hemoglobin 14.5 13.3 13.6   Hematocrit 42.5 39.2 40.9   Platelets 253 264 235            CHEMISTRIES:           Recent Labs   Lab 01/26/23  1413 04/06/23  1021 05/17/23  1258 03/25/24  1109 03/26/24  0440 03/27/24  0624   Glucose  --   --   --  110  105 117 H   Sodium 136 138 141 139 140 137   Potassium 4.3 3.7 3.9 3.2 L 3.9 3.9   BUN 17.9 16.7 20.1 H 9 14 19   Creatinine 0.77 0.76 0.70 0.9 0.9 0.7   eGFR  78 L 80 L 88 L  --   --   --    Calcium 9.6 9.1 8.8 10.3 9.7 9.2            CARDIAC BIOMARKERS:        Recent Labs   Lab 03/25/24  2257 03/26/24  0748 03/26/24  1912   Troponin I 0.136 H 0.242 H 0.229 H            COAGS:       Recent Labs   Lab 03/25/24  1124 03/26/24  1912   INR 1.0 1.0            LIPIDS/LFTS:        Recent Labs   Lab 01/26/23  1413 03/25/24  1109 03/26/24  0440   AST 24 24 14   ALT 25 16 14                  Hemoglobin A1C   Date Value Ref Range Status   12/11/2007 5.5 4.5 - 6.2 % Final              Hemoglobin A1c   Date Value Ref Range Status   03/25/2021 4.9 <5.7 % of total Hgb Final       Comment:       For the purpose of screening for the presence of  diabetes:     <5.7%       Consistent with the absence of diabetes  5.7-6.4%    Consistent with increased risk for diabetes              (prediabetes)  > or =6.5%  Consistent with diabetes     This assay result is consistent with a decreased risk  of diabetes.     Currently, no consensus exists regarding use of  hemoglobin A1c for diagnosis of diabetes in children.     According to American Diabetes Association (ADA)  guidelines, hemoglobin A1c <7.0% represents optimal  control in non-pregnant diabetic patients. Different  metrics may apply to specific patient populations.   Standards of Medical Care in Diabetes(ADA).             TSH         The ASCVD Risk score (Zack VALENCIA, et al., 2019) failed to calculate for the following reasons:    The 2019 ASCVD risk score is only valid for ages 40 to 79    The patient has a prior MI or stroke diagnosis         BNP           Lab Results   Component Value Date/Time      (H) 03/25/2024 11:09 AM     BNP 85 04/29/2015 03:31 PM                ECHO     Results for orders placed during the hospital encounter of 03/25/24     Echo      Interpretation Summary    Left Ventricle: There is concentric remodeling. There is normal systolic function with a visually estimated ejection fraction of 55 - 60%. Grade I diastolic dysfunction.    Right Ventricle: Normal right ventricular cavity size. Systolic function is normal.    Left Atrium: Left atrium is mildly dilated.    Aortic Valve: There is mild aortic regurgitation.    Tricuspid Valve: There is mild regurgitation.    Pulmonary Artery: The estimated pulmonary artery systolic pressure is 24 mmHg.    IVC/SVC: Normal venous pressure at 3 mmHg.        STRESS TEST     No results found for this or any previous visit.           CATH     Results for orders placed during the hospital encounter of 03/25/24     Cardiac catheterization     Conclusion    Successful IVUS guided PCI of prox to mid circ with COLTON x2 with excellent angiographic results.  IVUS post PCI revealed well-opposed and expanded stents.    The Prox Cx lesion was 95% stenosed with 0% stenosis post-intervention.    The Mid Cx lesion was 80% stenosed with 0% stenosis post-intervention.    Prox Cx lesion: A STENT SYNERGY XD [id number] stent was successfully placed at 12 ROCÍO for 15 sec.    Mid Cx lesion: A stent was successfully placed at 12 ROCÍO for 9 sec.    The estimated blood loss was <50 mL.     Coronary angiogram     Left main:  No significant stenosis     Lad:  Mid to distal long 80% stenosis.  Considering the length of this lesion and the diffusely diseased LAD, consider medical management and PCI only if the patient continues to have lifestyle limiting angina despite maximum medical management     Circumflex:  Proximal 95% and mid 80% stenosis.  Successful PCI with COLTON x2     RCA: Previously placed RCA stents are widely patent.        Access:  Right common femoral artery access.  Remove sheath when ACT less than 160        Assessment and plan     Continue aspirin 81 mg daily indefinitely     Plavix 75 mg daily for at least 1 year and longer if  no contraindication     Statins and aggressive risk factor modification                       The procedure log was documented by Documenter: Golden Gamboa RN and verified by Vitaliy Brown MD.     Date: 3/27/2024  Time: 12:58 PM     Assessment and Plan:      Brief HPI:      * STEMI (ST elevation myocardial infarction)  Cath lab activated for inferior STEMI.  Aspirin Plavix taking emergently to cardiac catheterization lab     Risks, benefits and alternatives of the catheterization procedure were discussed with the patient.The risks of coronary angiography include but are not limited to: bleeding, infection, death, heart attack, arrhythmia, kidney injury or failure, potential need for dialysis, allergic reactions, stroke, need for emergency surgery, hematoma, pseudoaneurysm etc.  Should stenting be indicated, the patient has agreed to dual anti-platelet therapy for 1-consecutive year with a drug-eluting stent and a minimum of 1-month with the use of a bare metal stent. Additionally, pt is aware that non-compliance is likely to result in stent clotting with heart attack, heart failure, and/or death  The risks of moderate sedation include hypotension, respiratory depression, arrhythmias, bronchospasm, and death. Informed consent was obtained and the  patient is agreeable to proceed with the procedure. Consent was placed on the chart.        3/26:  Status post PCI of subtotal severe heavily calcified RCA yesterday.  Also has severe 95% stenosis in proximal circ.  Plan for PCI tomorrow of the circ lesion  Continue dual antiplatelet therapy     Risks, benefits and alternatives of the catheterization procedure were discussed with the patient.The risks of coronary angiography include but are not limited to: bleeding, infection, death, heart attack, arrhythmia, kidney injury or failure, potential need for dialysis, allergic reactions, stroke, need for emergency surgery, hematoma, pseudoaneurysm etc.  Should stenting be  indicated, the patient has agreed to dual anti-platelet therapy for 1-consecutive year with a drug-eluting stent and a minimum of 1-month with the use of a bare metal stent. Additionally, pt is aware that non-compliance is likely to result in stent clotting with heart attack, heart failure, and/or death  The risks of moderate sedation include hypotension, respiratory depression, arrhythmias, bronchospasm, and death. Informed consent was obtained and the  patient is agreeable to proceed with the procedure. Consent was placed on the chart.        3/27:  PCI of circumflex performed today.  Continue aspirin Plavix statins with goal LDL less than 70.     3/28:  Doing fine.  Continue aspirin Plavix.  Statins.  No complications from PCI yesterday.  Angina free        Dressler's syndrome  Pericarditis post MI. initiate colchicine.        CAD (coronary artery disease)  Patient with known CAD s/p stent placement, which is uncontrolled Will continue ASA, Plavix, and Statin and monitor for S/Sx of angina/ACS.            VTE Risk Mitigation (From admission, onward)              Ordered       Place sequential compression device  Until discontinued         03/28/24 0933                          Vitaliy Brown MD  Cardiology  [facility] [place] - Intensive Care        Critical Care Time: 35 minutes     Critical care was time spent personally by me on the following activities: development of treatment plan with patient or surrogate and bedside caregivers, discussions with consultants, evaluation of patient's response to treatment, examination of patient, ordering and performing treatments and interventions, ordering and review of laboratory studies, ordering and review of radiographic studies, pulse oximetry, re-evaluation of patient's condition. This critical care time did not overlap with that of any other provider or involve time for any procedures.

## 2024-04-03 ENCOUNTER — HOSPITAL ENCOUNTER (INPATIENT)
Facility: HOSPITAL | Age: 79
LOS: 2 days | Discharge: HOME | DRG: 251 | End: 2024-04-05
Attending: EMERGENCY MEDICINE | Admitting: STUDENT IN AN ORGANIZED HEALTH CARE EDUCATION/TRAINING PROGRAM
Payer: MEDICARE

## 2024-04-03 DIAGNOSIS — I21.3 STEMI (ST ELEVATION MYOCARDIAL INFARCTION): ICD-10-CM

## 2024-04-03 DIAGNOSIS — R07.9 CHEST PAIN: ICD-10-CM

## 2024-04-03 DIAGNOSIS — I21.4 NSTEMI (NON-ST ELEVATED MYOCARDIAL INFARCTION): Primary | ICD-10-CM

## 2024-04-03 DIAGNOSIS — R07.89 CHEST HEAVINESS: ICD-10-CM

## 2024-04-03 DIAGNOSIS — I25.10 CAD S/P PERCUTANEOUS CORONARY ANGIOPLASTY: ICD-10-CM

## 2024-04-03 DIAGNOSIS — Z98.61 CAD S/P PERCUTANEOUS CORONARY ANGIOPLASTY: ICD-10-CM

## 2024-04-03 LAB
ALBUMIN SERPL BCP-MCNC: 3.2 G/DL (ref 3.5–5.2)
ALLENS TEST: ABNORMAL
ALP SERPL-CCNC: 105 U/L (ref 55–135)
ALT SERPL W/O P-5'-P-CCNC: 10 U/L (ref 10–44)
ANION GAP SERPL CALC-SCNC: 14 MMOL/L (ref 8–16)
APTT PPP: 27 SEC (ref 21–32)
AST SERPL-CCNC: 15 U/L (ref 10–40)
BASOPHILS # BLD AUTO: 0.01 K/UL (ref 0–0.2)
BASOPHILS NFR BLD: 0.1 % (ref 0–1.9)
BILIRUB SERPL-MCNC: 0.7 MG/DL (ref 0.1–1)
BNP SERPL-MCNC: 43 PG/ML (ref 0–99)
BUN SERPL-MCNC: 13 MG/DL (ref 8–23)
CALCIUM SERPL-MCNC: 9.3 MG/DL (ref 8.7–10.5)
CHLORIDE SERPL-SCNC: 111 MMOL/L (ref 95–110)
CO2 SERPL-SCNC: 18 MMOL/L (ref 23–29)
CREAT SERPL-MCNC: 0.8 MG/DL (ref 0.5–1.4)
DIFFERENTIAL METHOD BLD: ABNORMAL
EOSINOPHIL # BLD AUTO: 0.1 K/UL (ref 0–0.5)
EOSINOPHIL NFR BLD: 1.6 % (ref 0–8)
ERYTHROCYTE [DISTWIDTH] IN BLOOD BY AUTOMATED COUNT: 13.5 % (ref 11.5–14.5)
EST. GFR  (NO RACE VARIABLE): >60 ML/MIN/1.73 M^2
GLUCOSE SERPL-MCNC: 98 MG/DL (ref 70–110)
HCT VFR BLD AUTO: 32.8 % (ref 37–48.5)
HGB BLD-MCNC: 11.3 G/DL (ref 12–16)
IMM GRANULOCYTES # BLD AUTO: 0.04 K/UL (ref 0–0.04)
IMM GRANULOCYTES NFR BLD AUTO: 0.4 % (ref 0–0.5)
INR PPP: 1 (ref 0.8–1.2)
LYMPHOCYTES # BLD AUTO: 2.7 K/UL (ref 1–4.8)
LYMPHOCYTES NFR BLD: 30.3 % (ref 18–48)
MCH RBC QN AUTO: 28 PG (ref 27–31)
MCHC RBC AUTO-ENTMCNC: 34.5 G/DL (ref 32–36)
MCV RBC AUTO: 81 FL (ref 82–98)
MONOCYTES # BLD AUTO: 0.8 K/UL (ref 0.3–1)
MONOCYTES NFR BLD: 9.4 % (ref 4–15)
NEUTROPHILS # BLD AUTO: 5.2 K/UL (ref 1.8–7.7)
NEUTROPHILS NFR BLD: 58.2 % (ref 38–73)
NRBC BLD-RTO: 0 /100 WBC
PLATELET # BLD AUTO: 366 K/UL (ref 150–450)
PMV BLD AUTO: 9 FL (ref 9.2–12.9)
POC ACTIVATED CLOTTING TIME K: 239 SEC (ref 74–137)
POC ACTIVATED CLOTTING TIME K: 298 SEC (ref 74–137)
POTASSIUM SERPL-SCNC: 3 MMOL/L (ref 3.5–5.1)
PROT SERPL-MCNC: 6.2 G/DL (ref 6–8.4)
PROTHROMBIN TIME: 11.1 SEC (ref 9–12.5)
RBC # BLD AUTO: 4.03 M/UL (ref 4–5.4)
SAMPLE: ABNORMAL
SAMPLE: ABNORMAL
SITE: ABNORMAL
SITE: ABNORMAL
SODIUM SERPL-SCNC: 143 MMOL/L (ref 136–145)
TROPONIN I SERPL DL<=0.01 NG/ML-MCNC: 0.16 NG/ML (ref 0–0.03)
TROPONIN I SERPL DL<=0.01 NG/ML-MCNC: 2 NG/ML (ref 0–0.03)
WBC # BLD AUTO: 8.93 K/UL (ref 3.9–12.7)

## 2024-04-03 PROCEDURE — 92978 ENDOLUMINL IVUS OCT C 1ST: CPT | Mod: RC | Performed by: INTERNAL MEDICINE

## 2024-04-03 PROCEDURE — 99291 CRITICAL CARE FIRST HOUR: CPT | Mod: 25,,, | Performed by: INTERNAL MEDICINE

## 2024-04-03 PROCEDURE — 63600175 PHARM REV CODE 636 W HCPCS: Mod: JZ,JG | Performed by: INTERNAL MEDICINE

## 2024-04-03 PROCEDURE — C1725 CATH, TRANSLUMIN NON-LASER: HCPCS | Performed by: INTERNAL MEDICINE

## 2024-04-03 PROCEDURE — 93010 ELECTROCARDIOGRAM REPORT: CPT | Mod: 76,,, | Performed by: INTERNAL MEDICINE

## 2024-04-03 PROCEDURE — 21400001 HC TELEMETRY ROOM

## 2024-04-03 PROCEDURE — 99291 CRITICAL CARE FIRST HOUR: CPT

## 2024-04-03 PROCEDURE — C1894 INTRO/SHEATH, NON-LASER: HCPCS | Performed by: INTERNAL MEDICINE

## 2024-04-03 PROCEDURE — 93010 ELECTROCARDIOGRAM REPORT: CPT | Mod: 59,,, | Performed by: INTERNAL MEDICINE

## 2024-04-03 PROCEDURE — 83880 ASSAY OF NATRIURETIC PEPTIDE: CPT | Performed by: EMERGENCY MEDICINE

## 2024-04-03 PROCEDURE — 85025 COMPLETE CBC W/AUTO DIFF WBC: CPT | Performed by: EMERGENCY MEDICINE

## 2024-04-03 PROCEDURE — 96374 THER/PROPH/DIAG INJ IV PUSH: CPT

## 2024-04-03 PROCEDURE — 85610 PROTHROMBIN TIME: CPT | Performed by: EMERGENCY MEDICINE

## 2024-04-03 PROCEDURE — 25500020 PHARM REV CODE 255: Performed by: INTERNAL MEDICINE

## 2024-04-03 PROCEDURE — 93458 L HRT ARTERY/VENTRICLE ANGIO: CPT | Mod: 26,51,59, | Performed by: INTERNAL MEDICINE

## 2024-04-03 PROCEDURE — 36415 COLL VENOUS BLD VENIPUNCTURE: CPT | Performed by: EMERGENCY MEDICINE

## 2024-04-03 PROCEDURE — C9606 PERC D-E COR REVASC W AMI S: HCPCS | Mod: RC | Performed by: INTERNAL MEDICINE

## 2024-04-03 PROCEDURE — 93005 ELECTROCARDIOGRAM TRACING: CPT

## 2024-04-03 PROCEDURE — 4A023N7 MEASUREMENT OF CARDIAC SAMPLING AND PRESSURE, LEFT HEART, PERCUTANEOUS APPROACH: ICD-10-PCS | Performed by: INTERNAL MEDICINE

## 2024-04-03 PROCEDURE — 63600175 PHARM REV CODE 636 W HCPCS: Performed by: EMERGENCY MEDICINE

## 2024-04-03 PROCEDURE — 85730 THROMBOPLASTIN TIME PARTIAL: CPT | Performed by: EMERGENCY MEDICINE

## 2024-04-03 PROCEDURE — 99152 MOD SED SAME PHYS/QHP 5/>YRS: CPT | Performed by: INTERNAL MEDICINE

## 2024-04-03 PROCEDURE — 85347 COAGULATION TIME ACTIVATED: CPT | Performed by: INTERNAL MEDICINE

## 2024-04-03 PROCEDURE — 84484 ASSAY OF TROPONIN QUANT: CPT | Mod: 91 | Performed by: EMERGENCY MEDICINE

## 2024-04-03 PROCEDURE — B240ZZ3 ULTRASONOGRAPHY OF SINGLE CORONARY ARTERY, INTRAVASCULAR: ICD-10-PCS | Performed by: INTERNAL MEDICINE

## 2024-04-03 PROCEDURE — 99153 MOD SED SAME PHYS/QHP EA: CPT | Performed by: INTERNAL MEDICINE

## 2024-04-03 PROCEDURE — 99152 MOD SED SAME PHYS/QHP 5/>YRS: CPT | Mod: ,,, | Performed by: INTERNAL MEDICINE

## 2024-04-03 PROCEDURE — 92978 ENDOLUMINL IVUS OCT C 1ST: CPT | Mod: 26,RC,, | Performed by: INTERNAL MEDICINE

## 2024-04-03 PROCEDURE — B2111ZZ FLUOROSCOPY OF MULTIPLE CORONARY ARTERIES USING LOW OSMOLAR CONTRAST: ICD-10-PCS | Performed by: INTERNAL MEDICINE

## 2024-04-03 PROCEDURE — 80053 COMPREHEN METABOLIC PANEL: CPT | Performed by: EMERGENCY MEDICINE

## 2024-04-03 PROCEDURE — 93458 L HRT ARTERY/VENTRICLE ANGIO: CPT | Mod: 59 | Performed by: INTERNAL MEDICINE

## 2024-04-03 PROCEDURE — C1769 GUIDE WIRE: HCPCS | Performed by: INTERNAL MEDICINE

## 2024-04-03 PROCEDURE — 92941 PRQ TRLML REVSC TOT OCCL AMI: CPT | Mod: RC,,, | Performed by: INTERNAL MEDICINE

## 2024-04-03 PROCEDURE — 96375 TX/PRO/DX INJ NEW DRUG ADDON: CPT

## 2024-04-03 PROCEDURE — C1753 CATH, INTRAVAS ULTRASOUND: HCPCS | Performed by: INTERNAL MEDICINE

## 2024-04-03 PROCEDURE — 25000003 PHARM REV CODE 250: Performed by: INTERNAL MEDICINE

## 2024-04-03 PROCEDURE — C1887 CATHETER, GUIDING: HCPCS | Performed by: INTERNAL MEDICINE

## 2024-04-03 PROCEDURE — 27201423 OPTIME MED/SURG SUP & DEVICES STERILE SUPPLY: Performed by: INTERNAL MEDICINE

## 2024-04-03 PROCEDURE — 02703ZZ DILATION OF CORONARY ARTERY, ONE ARTERY, PERCUTANEOUS APPROACH: ICD-10-PCS | Performed by: INTERNAL MEDICINE

## 2024-04-03 RX ORDER — AMLODIPINE BESYLATE 5 MG/1
5 TABLET ORAL DAILY
Status: DISCONTINUED | OUTPATIENT
Start: 2024-04-04 | End: 2024-04-05 | Stop reason: HOSPADM

## 2024-04-03 RX ORDER — NAPROXEN SODIUM 220 MG/1
81 TABLET, FILM COATED ORAL DAILY
Status: DISCONTINUED | OUTPATIENT
Start: 2024-04-04 | End: 2024-04-03 | Stop reason: SDUPTHER

## 2024-04-03 RX ORDER — MIDAZOLAM HYDROCHLORIDE 1 MG/ML
INJECTION INTRAMUSCULAR; INTRAVENOUS
Status: DISCONTINUED | OUTPATIENT
Start: 2024-04-03 | End: 2024-04-03 | Stop reason: HOSPADM

## 2024-04-03 RX ORDER — POLYETHYLENE GLYCOL 3350 17 G/17G
17 POWDER, FOR SOLUTION ORAL DAILY
Status: DISCONTINUED | OUTPATIENT
Start: 2024-04-04 | End: 2024-04-05 | Stop reason: HOSPADM

## 2024-04-03 RX ORDER — ASPIRIN 81 MG/1
81 TABLET ORAL DAILY
Status: DISCONTINUED | OUTPATIENT
Start: 2024-04-04 | End: 2024-04-05 | Stop reason: HOSPADM

## 2024-04-03 RX ORDER — IBUPROFEN 200 MG
16 TABLET ORAL
Status: DISCONTINUED | OUTPATIENT
Start: 2024-04-03 | End: 2024-04-05 | Stop reason: HOSPADM

## 2024-04-03 RX ORDER — CLOPIDOGREL BISULFATE 75 MG/1
75 TABLET ORAL DAILY
Status: DISCONTINUED | OUTPATIENT
Start: 2024-04-04 | End: 2024-04-05

## 2024-04-03 RX ORDER — ASPIRIN 325 MG
325 TABLET ORAL
Status: DISCONTINUED | OUTPATIENT
Start: 2024-04-03 | End: 2024-04-03

## 2024-04-03 RX ORDER — ACETAMINOPHEN 325 MG/1
650 TABLET ORAL EVERY 4 HOURS PRN
Status: DISCONTINUED | OUTPATIENT
Start: 2024-04-03 | End: 2024-04-05 | Stop reason: HOSPADM

## 2024-04-03 RX ORDER — SODIUM,POTASSIUM PHOSPHATES 280-250MG
2 POWDER IN PACKET (EA) ORAL
Status: DISCONTINUED | OUTPATIENT
Start: 2024-04-03 | End: 2024-04-05 | Stop reason: HOSPADM

## 2024-04-03 RX ORDER — CLOPIDOGREL BISULFATE 75 MG/1
75 TABLET ORAL DAILY
Status: DISCONTINUED | OUTPATIENT
Start: 2024-04-04 | End: 2024-04-03 | Stop reason: HOSPADM

## 2024-04-03 RX ORDER — SIMETHICONE 80 MG
1 TABLET,CHEWABLE ORAL 4 TIMES DAILY PRN
Status: DISCONTINUED | OUTPATIENT
Start: 2024-04-03 | End: 2024-04-05 | Stop reason: HOSPADM

## 2024-04-03 RX ORDER — VERAPAMIL HYDROCHLORIDE 2.5 MG/ML
INJECTION, SOLUTION INTRAVENOUS
Status: DISCONTINUED | OUTPATIENT
Start: 2024-04-03 | End: 2024-04-03 | Stop reason: HOSPADM

## 2024-04-03 RX ORDER — ASPIRIN 81 MG/1
81 TABLET ORAL DAILY
Status: DISCONTINUED | OUTPATIENT
Start: 2024-04-04 | End: 2024-04-03 | Stop reason: SDUPTHER

## 2024-04-03 RX ORDER — GLUCAGON 1 MG
1 KIT INJECTION
Status: DISCONTINUED | OUTPATIENT
Start: 2024-04-03 | End: 2024-04-05 | Stop reason: HOSPADM

## 2024-04-03 RX ORDER — LANOLIN ALCOHOL/MO/W.PET/CERES
800 CREAM (GRAM) TOPICAL
Status: DISCONTINUED | OUTPATIENT
Start: 2024-04-03 | End: 2024-04-05 | Stop reason: HOSPADM

## 2024-04-03 RX ORDER — ONDANSETRON HYDROCHLORIDE 2 MG/ML
4 INJECTION, SOLUTION INTRAVENOUS EVERY 8 HOURS PRN
Status: DISCONTINUED | OUTPATIENT
Start: 2024-04-03 | End: 2024-04-05 | Stop reason: HOSPADM

## 2024-04-03 RX ORDER — HEPARIN SODIUM,PORCINE/D5W 25000/250
0-40 INTRAVENOUS SOLUTION INTRAVENOUS CONTINUOUS
Status: DISCONTINUED | OUTPATIENT
Start: 2024-04-03 | End: 2024-04-03

## 2024-04-03 RX ORDER — ISOSORBIDE MONONITRATE 30 MG/1
30 TABLET, EXTENDED RELEASE ORAL DAILY
Status: DISCONTINUED | OUTPATIENT
Start: 2024-04-04 | End: 2024-04-05 | Stop reason: HOSPADM

## 2024-04-03 RX ORDER — IBUPROFEN 200 MG
24 TABLET ORAL
Status: DISCONTINUED | OUTPATIENT
Start: 2024-04-03 | End: 2024-04-05 | Stop reason: HOSPADM

## 2024-04-03 RX ORDER — ATROPINE SULFATE 0.1 MG/ML
0.5 INJECTION INTRAVENOUS
Status: DISCONTINUED | OUTPATIENT
Start: 2024-04-03 | End: 2024-04-05 | Stop reason: HOSPADM

## 2024-04-03 RX ORDER — METOPROLOL SUCCINATE 50 MG/1
50 TABLET, EXTENDED RELEASE ORAL DAILY
Status: DISCONTINUED | OUTPATIENT
Start: 2024-04-04 | End: 2024-04-05 | Stop reason: HOSPADM

## 2024-04-03 RX ORDER — IPRATROPIUM BROMIDE AND ALBUTEROL SULFATE 2.5; .5 MG/3ML; MG/3ML
3 SOLUTION RESPIRATORY (INHALATION) EVERY 4 HOURS PRN
Status: DISCONTINUED | OUTPATIENT
Start: 2024-04-03 | End: 2024-04-05 | Stop reason: HOSPADM

## 2024-04-03 RX ORDER — LIDOCAINE HYDROCHLORIDE 10 MG/ML
INJECTION, SOLUTION EPIDURAL; INFILTRATION; INTRACAUDAL; PERINEURAL
Status: DISCONTINUED | OUTPATIENT
Start: 2024-04-03 | End: 2024-04-03 | Stop reason: HOSPADM

## 2024-04-03 RX ORDER — ATORVASTATIN CALCIUM 40 MG/1
80 TABLET, FILM COATED ORAL NIGHTLY
Status: DISCONTINUED | OUTPATIENT
Start: 2024-04-03 | End: 2024-04-05 | Stop reason: HOSPADM

## 2024-04-03 RX ORDER — ALUMINUM HYDROXIDE, MAGNESIUM HYDROXIDE, AND SIMETHICONE 1200; 120; 1200 MG/30ML; MG/30ML; MG/30ML
30 SUSPENSION ORAL 4 TIMES DAILY PRN
Status: DISCONTINUED | OUTPATIENT
Start: 2024-04-03 | End: 2024-04-05 | Stop reason: HOSPADM

## 2024-04-03 RX ORDER — TALC
6 POWDER (GRAM) TOPICAL NIGHTLY PRN
Status: DISCONTINUED | OUTPATIENT
Start: 2024-04-03 | End: 2024-04-05 | Stop reason: HOSPADM

## 2024-04-03 RX ORDER — ONDANSETRON 8 MG/1
8 TABLET, ORALLY DISINTEGRATING ORAL EVERY 8 HOURS PRN
Status: DISCONTINUED | OUTPATIENT
Start: 2024-04-03 | End: 2024-04-03

## 2024-04-03 RX ORDER — NITROGLYCERIN 0.4 MG/1
0.4 TABLET SUBLINGUAL EVERY 5 MIN PRN
Status: DISCONTINUED | OUTPATIENT
Start: 2024-04-03 | End: 2024-04-05 | Stop reason: HOSPADM

## 2024-04-03 RX ORDER — FENTANYL CITRATE 50 UG/ML
INJECTION, SOLUTION INTRAMUSCULAR; INTRAVENOUS
Status: DISCONTINUED | OUTPATIENT
Start: 2024-04-03 | End: 2024-04-03 | Stop reason: HOSPADM

## 2024-04-03 RX ORDER — ATROPINE SULFATE 0.1 MG/ML
INJECTION INTRAVENOUS
Status: DISCONTINUED | OUTPATIENT
Start: 2024-04-03 | End: 2024-04-03 | Stop reason: HOSPADM

## 2024-04-03 RX ORDER — BISACODYL 10 MG/1
10 SUPPOSITORY RECTAL DAILY PRN
Status: DISCONTINUED | OUTPATIENT
Start: 2024-04-03 | End: 2024-04-05 | Stop reason: HOSPADM

## 2024-04-03 RX ORDER — SODIUM CHLORIDE 0.9 % (FLUSH) 0.9 %
10 SYRINGE (ML) INJECTION EVERY 12 HOURS PRN
Status: DISCONTINUED | OUTPATIENT
Start: 2024-04-03 | End: 2024-04-05 | Stop reason: HOSPADM

## 2024-04-03 RX ORDER — SODIUM CHLORIDE 9 MG/ML
INJECTION, SOLUTION INTRAVENOUS CONTINUOUS
Status: DISCONTINUED | OUTPATIENT
Start: 2024-04-03 | End: 2024-04-03

## 2024-04-03 RX ORDER — HEPARIN SODIUM 1000 [USP'U]/ML
INJECTION, SOLUTION INTRAVENOUS; SUBCUTANEOUS
Status: DISCONTINUED | OUTPATIENT
Start: 2024-04-03 | End: 2024-04-03 | Stop reason: HOSPADM

## 2024-04-03 RX ORDER — NALOXONE HCL 0.4 MG/ML
0.02 VIAL (ML) INJECTION
Status: DISCONTINUED | OUTPATIENT
Start: 2024-04-03 | End: 2024-04-05 | Stop reason: HOSPADM

## 2024-04-03 RX ORDER — MORPHINE SULFATE 4 MG/ML
2 INJECTION, SOLUTION INTRAMUSCULAR; INTRAVENOUS EVERY 10 MIN PRN
Status: DISCONTINUED | OUTPATIENT
Start: 2024-04-03 | End: 2024-04-05 | Stop reason: HOSPADM

## 2024-04-03 RX ORDER — PROCHLORPERAZINE EDISYLATE 5 MG/ML
5 INJECTION INTRAMUSCULAR; INTRAVENOUS EVERY 6 HOURS PRN
Status: DISCONTINUED | OUTPATIENT
Start: 2024-04-03 | End: 2024-04-05 | Stop reason: HOSPADM

## 2024-04-03 RX ORDER — ATORVASTATIN CALCIUM 40 MG/1
80 TABLET, FILM COATED ORAL NIGHTLY
Status: DISCONTINUED | OUTPATIENT
Start: 2024-04-03 | End: 2024-04-03 | Stop reason: SDUPTHER

## 2024-04-03 RX ADMIN — HEPARIN SODIUM 12 UNITS/KG/HR: 10000 INJECTION, SOLUTION INTRAVENOUS at 08:04

## 2024-04-03 NOTE — Clinical Note
The radial band was applied to the right radial artery. 22 cc's of air were inserted into the closure device.

## 2024-04-03 NOTE — Clinical Note
The catheter was inserted into the and was removed from the proximal   right coronary artery. IVUS Performed

## 2024-04-04 PROBLEM — R31.9 HEMATURIA: Status: ACTIVE | Noted: 2024-04-04

## 2024-04-04 LAB
ANION GAP SERPL CALC-SCNC: 10 MMOL/L (ref 8–16)
ASCENDING AORTA: 3.1 CM
AV INDEX (PROSTH): 0.74
AV MEAN GRADIENT: 4 MMHG
AV PEAK GRADIENT: 5 MMHG
AV VALVE AREA BY VELOCITY RATIO: 2.2 CM²
AV VALVE AREA: 2.3 CM²
AV VELOCITY RATIO: 0.71
BASOPHILS # BLD AUTO: 0.02 K/UL (ref 0–0.2)
BASOPHILS NFR BLD: 0.3 % (ref 0–1.9)
BSA FOR ECHO PROCEDURE: 1.69 M2
BUN SERPL-MCNC: 8 MG/DL (ref 8–23)
CALCIUM SERPL-MCNC: 8 MG/DL (ref 8.7–10.5)
CHLORIDE SERPL-SCNC: 110 MMOL/L (ref 95–110)
CO2 SERPL-SCNC: 21 MMOL/L (ref 23–29)
CREAT SERPL-MCNC: 0.6 MG/DL (ref 0.5–1.4)
CV ECHO LV RWT: 0.41 CM
DIFFERENTIAL METHOD BLD: ABNORMAL
DOP CALC AO PEAK VEL: 1.13 M/S
DOP CALC AO VTI: 27.6 CM
DOP CALC LVOT AREA: 3.1 CM2
DOP CALC LVOT DIAMETER: 1.99 CM
DOP CALC LVOT PEAK VEL: 0.8 M/S
DOP CALC LVOT STROKE VOLUME: 63.42 CM3
DOP CALCLVOT PEAK VEL VTI: 20.4 CM
E WAVE DECELERATION TIME: 237.7 MSEC
E/A RATIO: 0.69
E/E' RATIO: 16.86 M/S
ECHO LV POSTERIOR WALL: 0.86 CM (ref 0.6–1.1)
EOSINOPHIL # BLD AUTO: 0.2 K/UL (ref 0–0.5)
EOSINOPHIL NFR BLD: 2.4 % (ref 0–8)
ERYTHROCYTE [DISTWIDTH] IN BLOOD BY AUTOMATED COUNT: 13.6 % (ref 11.5–14.5)
EST. GFR  (NO RACE VARIABLE): >60 ML/MIN/1.73 M^2
FRACTIONAL SHORTENING: 29 % (ref 28–44)
GLUCOSE SERPL-MCNC: 91 MG/DL (ref 70–110)
HCT VFR BLD AUTO: 34.8 % (ref 37–48.5)
HGB BLD-MCNC: 11.4 G/DL (ref 12–16)
IMM GRANULOCYTES # BLD AUTO: 0.03 K/UL (ref 0–0.04)
IMM GRANULOCYTES NFR BLD AUTO: 0.4 % (ref 0–0.5)
INTERVENTRICULAR SEPTUM: 0.9 CM (ref 0.6–1.1)
IVC DIAMETER: 0.98 CM
IVRT: 117.03 MSEC
LA MAJOR: 4.5 CM
LA MINOR: 3.82 CM
LA WIDTH: 3.8 CM
LEFT ATRIUM SIZE: 2.98 CM
LEFT ATRIUM VOLUME INDEX: 24.3 ML/M2
LEFT ATRIUM VOLUME: 39.77 CM3
LEFT INTERNAL DIMENSION IN SYSTOLE: 3.03 CM (ref 2.1–4)
LEFT VENTRICLE DIASTOLIC VOLUME INDEX: 48.87 ML/M2
LEFT VENTRICLE DIASTOLIC VOLUME: 80.15 ML
LEFT VENTRICLE MASS INDEX: 71 G/M2
LEFT VENTRICLE SYSTOLIC VOLUME INDEX: 21.8 ML/M2
LEFT VENTRICLE SYSTOLIC VOLUME: 35.81 ML
LEFT VENTRICULAR INTERNAL DIMENSION IN DIASTOLE: 4.24 CM (ref 3.5–6)
LEFT VENTRICULAR MASS: 116.89 G
LV LATERAL E/E' RATIO: 14.75 M/S
LV SEPTAL E/E' RATIO: 19.67 M/S
LVOT MG: 1.44 MMHG
LVOT MV: 0.57 CM/S
LYMPHOCYTES # BLD AUTO: 1.8 K/UL (ref 1–4.8)
LYMPHOCYTES NFR BLD: 24 % (ref 18–48)
MCH RBC QN AUTO: 27.7 PG (ref 27–31)
MCHC RBC AUTO-ENTMCNC: 32.8 G/DL (ref 32–36)
MCV RBC AUTO: 85 FL (ref 82–98)
MONOCYTES # BLD AUTO: 0.7 K/UL (ref 0.3–1)
MONOCYTES NFR BLD: 8.7 % (ref 4–15)
MV PEAK A VEL: 0.85 M/S
MV PEAK E VEL: 0.59 M/S
MV STENOSIS PRESSURE HALF TIME: 68.93 MS
MV VALVE AREA P 1/2 METHOD: 3.19 CM2
NEUTROPHILS # BLD AUTO: 4.8 K/UL (ref 1.8–7.7)
NEUTROPHILS NFR BLD: 64.2 % (ref 38–73)
NRBC BLD-RTO: 0 /100 WBC
OHS QRS DURATION: 86 MS
OHS QRS DURATION: 86 MS
OHS QRS DURATION: 90 MS
OHS QRS DURATION: 94 MS
OHS QTC CALCULATION: 425 MS
OHS QTC CALCULATION: 431 MS
OHS QTC CALCULATION: 444 MS
OHS QTC CALCULATION: 486 MS
PISA TR MAX VEL: 1.64 M/S
PLATELET # BLD AUTO: 307 K/UL (ref 150–450)
PMV BLD AUTO: 8.9 FL (ref 9.2–12.9)
POTASSIUM SERPL-SCNC: 3.4 MMOL/L (ref 3.5–5.1)
PULM VEIN S/D RATIO: 1.56
PV PEAK D VEL: 0.41 M/S
PV PEAK GRADIENT: 1 MMHG
PV PEAK S VEL: 0.64 M/S
PV PEAK VELOCITY: 0.57 M/S
RA MAJOR: 3.59 CM
RA PRESSURE ESTIMATED: 3 MMHG
RA WIDTH: 2.77 CM
RBC # BLD AUTO: 4.12 M/UL (ref 4–5.4)
RIGHT VENTRICULAR END-DIASTOLIC DIMENSION: 2.99 CM
RV TB RVSP: 5 MMHG
SINUS: 3.3 CM
SODIUM SERPL-SCNC: 141 MMOL/L (ref 136–145)
STJ: 2.4 CM
TDI LATERAL: 0.04 M/S
TDI SEPTAL: 0.03 M/S
TDI: 0.04 M/S
TR MAX PG: 11 MMHG
TRICUSPID ANNULAR PLANE SYSTOLIC EXCURSION: 1.38 CM
TV PEAK GRADIENT: 1 MMHG
TV REST PULMONARY ARTERY PRESSURE: 14 MMHG
WBC # BLD AUTO: 7.49 K/UL (ref 3.9–12.7)
Z-SCORE OF LEFT VENTRICULAR DIMENSION IN END DIASTOLE: -0.85
Z-SCORE OF LEFT VENTRICULAR DIMENSION IN END SYSTOLE: 0.45

## 2024-04-04 PROCEDURE — 94761 N-INVAS EAR/PLS OXIMETRY MLT: CPT

## 2024-04-04 PROCEDURE — 93005 ELECTROCARDIOGRAM TRACING: CPT

## 2024-04-04 PROCEDURE — 25000003 PHARM REV CODE 250: Performed by: STUDENT IN AN ORGANIZED HEALTH CARE EDUCATION/TRAINING PROGRAM

## 2024-04-04 PROCEDURE — 93010 ELECTROCARDIOGRAM REPORT: CPT | Mod: ,,, | Performed by: INTERNAL MEDICINE

## 2024-04-04 PROCEDURE — 85025 COMPLETE CBC W/AUTO DIFF WBC: CPT | Performed by: INTERNAL MEDICINE

## 2024-04-04 PROCEDURE — 99233 SBSQ HOSP IP/OBS HIGH 50: CPT | Mod: 25,,, | Performed by: INTERNAL MEDICINE

## 2024-04-04 PROCEDURE — 80048 BASIC METABOLIC PNL TOTAL CA: CPT | Performed by: INTERNAL MEDICINE

## 2024-04-04 PROCEDURE — 99900035 HC TECH TIME PER 15 MIN (STAT)

## 2024-04-04 PROCEDURE — 36415 COLL VENOUS BLD VENIPUNCTURE: CPT | Performed by: INTERNAL MEDICINE

## 2024-04-04 PROCEDURE — 63600175 PHARM REV CODE 636 W HCPCS: Performed by: STUDENT IN AN ORGANIZED HEALTH CARE EDUCATION/TRAINING PROGRAM

## 2024-04-04 PROCEDURE — 11000001 HC ACUTE MED/SURG PRIVATE ROOM

## 2024-04-04 PROCEDURE — 25000003 PHARM REV CODE 250: Performed by: INTERNAL MEDICINE

## 2024-04-04 RX ORDER — MUPIROCIN 20 MG/G
OINTMENT TOPICAL 2 TIMES DAILY
Status: DISCONTINUED | OUTPATIENT
Start: 2024-04-04 | End: 2024-04-05 | Stop reason: HOSPADM

## 2024-04-04 RX ORDER — ENOXAPARIN SODIUM 100 MG/ML
40 INJECTION SUBCUTANEOUS EVERY 24 HOURS
Status: DISCONTINUED | OUTPATIENT
Start: 2024-04-04 | End: 2024-04-05 | Stop reason: HOSPADM

## 2024-04-04 RX ADMIN — ATORVASTATIN CALCIUM 80 MG: 40 TABLET, FILM COATED ORAL at 12:04

## 2024-04-04 RX ADMIN — ASPIRIN 81 MG: 81 TABLET, COATED ORAL at 09:04

## 2024-04-04 RX ADMIN — POTASSIUM BICARBONATE 35 MEQ: 391 TABLET, EFFERVESCENT ORAL at 05:04

## 2024-04-04 RX ADMIN — ENOXAPARIN SODIUM 40 MG: 40 INJECTION SUBCUTANEOUS at 05:04

## 2024-04-04 RX ADMIN — AMLODIPINE BESYLATE 5 MG: 5 TABLET ORAL at 09:04

## 2024-04-04 RX ADMIN — SODIUM CHLORIDE 1000 ML: 9 INJECTION, SOLUTION INTRAVENOUS at 12:04

## 2024-04-04 RX ADMIN — POTASSIUM BICARBONATE 35 MEQ: 391 TABLET, EFFERVESCENT ORAL at 07:04

## 2024-04-04 RX ADMIN — ATORVASTATIN CALCIUM 80 MG: 40 TABLET, FILM COATED ORAL at 09:04

## 2024-04-04 RX ADMIN — METOPROLOL SUCCINATE 50 MG: 50 TABLET, EXTENDED RELEASE ORAL at 09:04

## 2024-04-04 RX ADMIN — CLOPIDOGREL BISULFATE 75 MG: 75 TABLET ORAL at 09:04

## 2024-04-04 RX ADMIN — ISOSORBIDE MONONITRATE 30 MG: 30 TABLET, EXTENDED RELEASE ORAL at 09:04

## 2024-04-04 NOTE — BRIEF OP NOTE
[facility] - Cath Lab  Brief Operative Note     SUMMARY     Surgery Date: 4/3/2024     Surgeon(s) and Role:     * Cl Rodriguez MD - Primary    Assisting Surgeon: None    Pre-op Diagnosis:  STEMI (ST elevation myocardial infarction) [I21.3]    Post-op Diagnosis:  Post-Op Diagnosis Codes:     * STEMI (ST elevation myocardial infarction) [I21.3]    Procedure(s) (LRB):  Angiogram, Coronary, with Left Heart Cath (N/A)  IVUS, Coronary  Angioplasty-coronary    Anesthesia: RN IV Sedation    Description of the findings of the procedure: uneventful LHC/cor angio/IVUS guided PCI mid RCA AST (POBA)/R rad vasband.    Findings/Key Components:  LVEDP: 5mmHg  LVEF: echo ordered, prev normal LV fxn    Dominance: Right  LM: normal  LAD: mid diffuse 90% stenosis, small caliber distal vessel  LCx: patent prox-mid stents (placed 3/27/24)  RCA: prox-mid stents thrombotic occlusion (placed 3/25/24), faint antonio to RPDA from LAD.    PCI prox-mid RCA AST:  Preop ASA/Plavix, intra-op heparin  Predil 2.5x20 POBA  IVUS for assessment of stents.  ?underexpansion of distal stent edge.  POBA 2.75x30 NC 20 domingo  Post IVUS with excellent stent expansion and apposition, no dissections  Excellent angiographic result, T3 flow, 0% residual stenosis    Hemostasis:  R Radial band    Impression:  NSTEMI  AST of RCA stents (placed 3/25/24) in setting of inadvertent ASA discontinuation  Successful IVUS guided PCI prox-mid RCA POBA  R rad vasband for hemostasis    Plan:  Admit to ICU  ASA 81mg qd indefinitely  Plavix 75mg qd for 1 year (thru Apr 2025), likely indef rx  Statin  Check echo  Outpat card rehab  Follow up with Dr. Brown after discharge.    Estimated Blood Loss: <50cc         Specimens: None

## 2024-04-04 NOTE — ASSESSMENT & PLAN NOTE
Concerning symptoms which replicated her presentation on 03/25/2024 for STEMI.    EKG with dynamic ST elevation, now abated with resolution of chest pain.  MV PCI (RCA and LCx) with unrevascularized LAD disease.  Patient tells me she has not been taking aspirin.  She has been taking clopidogrel.  Pros and cons of coronary angiography discussed with the patient she agrees to proceed.  Permit has been signed.  Admit to ICU on hospital medicine service.  Check echo in am  Cont statin.

## 2024-04-04 NOTE — ASSESSMENT & PLAN NOTE
Patient presents with chest pain.   Initial EKG showed ST elevations in inferior leads with reciprocal changes in anterolateral leads that resolved on subsequent EKGs. She was started on heparin drip and a code STEMI was activated; cardiology thought this was more consistent with high risk NSTEMI with concern for stent thrombosis.   Patient was taken emergently to the cath lab which showed prox-mid RCA stent thrombotic occlusion (likely in the setting of inadvertent ASA discontinuation) s/p prox-mid RCA angioplasty.    Plan:   Aspirin 81 mg once daily indefinitely   Plavix 75 mg once daily for 1 year  Lipitor 80 od  Echo pending  Outpatient cardiac rehab and follow up with Dr. Brown at discharge

## 2024-04-04 NOTE — HPI
"78F presents to the ED with chest pain and associated pressure that began 1 hour PTA. She reports the pain radiates to her back and describes it as "traveling" across her chest. Additional history provided by independent historian, EMS, who states pt was given 1 nitro and 324 mg Asprin en route. States pt initially rated the pain an 8/10, then after recieving the nitro rated it a 6/10. At time of exam she rates the pain 2/10. States she had 2 stents placed 1 week ago. PMHx significant for CAD, HTN, HLD. No other exacerbating or alleviating factors. Denies nausea, vomiting, diaphoresis, or other associated symptoms.      Pt follows with Dr. Brown, seen last week for STEMi s/p MV PCI.    Cardiology consulted for CP with transient AHMET.    The patient is a pleasant 78-year-old woman who presents complaining of severe anterior chest discomfort similar to that for her STEMI in late March 2024.  She presented with an inferior myocardial infarction.  Review of the electrocardiogram at that time noted ST elevation limited to lead III with reciprocal changes elsewhere.  EKG by EMS today noted a similar pattern.  She was given 325 mg of aspirin EN route, and her pain began to marcelo and is now essentially resolved with resolution of her ST elevations.  On review with the patient, she tells me she was never instructed to take aspirin at discharge.  She has been taking Plavix.  She underwent PCI of her RCA for the index procedure and staged PCI of her circumflex.  Diffuse mid LAD disease was noted for which medical therapy has been planned.  Given concerns for stent thrombosis, I have suggested we proceed to the cath lab in order to exclude this potentially fatal complication.  Pros and cons of angiography were discussed with the patient she agrees to proceed.  Permit was signed.        "

## 2024-04-04 NOTE — SUBJECTIVE & OBJECTIVE
Interval History:  Patient reports 1 episode of bloody urine this morning.  No hematemesis/hematochezia/melena.  Denied dysuria/abdominal pain.  Right radial site with no evidence of active bleeding.  Previous right groin cath site with bruising in various stages of healing    Review of Systems   Constitutional: Negative.    Respiratory: Negative.     Cardiovascular: Negative.    Gastrointestinal: Negative.    Musculoskeletal: Negative.    Neurological: Negative.      Objective:     Vital Signs (Most Recent):  Temp: 98 °F (36.7 °C) (04/04/24 0715)  Pulse: 67 (04/04/24 1000)  Resp: (!) 35 (04/04/24 1000)  BP: (!) 159/65 (04/04/24 1000)  SpO2: 100 % (04/04/24 1000) Vital Signs (24h Range):  Temp:  [97.8 °F (36.6 °C)-98.6 °F (37 °C)] 98 °F (36.7 °C)  Pulse:  [58-73] 67  Resp:  [15-35] 35  SpO2:  [97 %-100 %] 100 %  BP: (128-176)/(56-81) 159/65     Weight: 67 kg (147 lb 11.3 oz)  Body mass index is 28.85 kg/m².    Intake/Output Summary (Last 24 hours) at 4/4/2024 1159  Last data filed at 4/4/2024 0549  Gross per 24 hour   Intake 2461.71 ml   Output 1200 ml   Net 1261.71 ml         Physical Exam  Constitutional:       General: She is not in acute distress.     Appearance: She is normal weight.   Cardiovascular:      Rate and Rhythm: Normal rate.      Pulses: Normal pulses.      Heart sounds: No murmur heard.  Pulmonary:      Effort: No respiratory distress.      Breath sounds: No wheezing.   Abdominal:      General: Bowel sounds are normal.      Palpations: Abdomen is soft.   Musculoskeletal:      Right lower leg: No edema.      Left lower leg: No edema.   Skin:     Findings: Bruising (At right groin various stages of healing) present.   Neurological:      General: No focal deficit present.      Mental Status: She is alert and oriented to person, place, and time. Mental status is at baseline.   Psychiatric:         Mood and Affect: Mood normal.             Significant Labs: All pertinent labs within the past 24 hours  have been reviewed.    Significant Imaging: I have reviewed all pertinent imaging results/findings within the past 24 hours.

## 2024-04-04 NOTE — H&P
"  [facility]  History & Physical    Patient Name: Elizabeth Ayala  MRN: [id number]  Patient Class: IP- Inpatient  Admission Date: 4/3/2024  Attending Physician: Naomi Bar,*   Primary Care Provider: Amna Levy MD         Patient information was obtained from patient and ER records.     Subjective:     Principal Problem:NSTEMI (non-ST elevated myocardial infarction)    Chief Complaint:   Chief Complaint   Patient presents with    Chest Pain     Patient presents to ED via EMS [facility] secondary to chest pain x1 hour which began while at rest. Had 2 stents placed by Dr Botello one week ago. Midsternal and radiates to back. Described as "pressure" and "spreading". Denies accompanying symptoms. Patient received 324 mg aspirin and 1 sL nitro. Rates pain at 6/10 at worse and now 2/10.         HPI: This is an 78-year-old female with a past medical history of CAD/STEMI s/p PCI (3/25, 3/27), hypertension, hyperlipidemia, depression, anxiety, who presents with chest pain.      Patient had a recent hospitalization (3/25- 3/28) for STEMI and underwent multivessel PCI; on 3/25 s/p COLTON to pRCA and on 3/27 s/p COLTON to Prox Cx and mid Cx with residual 80% stenosis of mid to distal LAD. She was discharged with aspirin and plavix, but she was only taken her Plavix. Patient developed chest pain shortly prior to presentation. EMS administered aspirin 324 mg, and nitroglycerin SL.     In the ED, the patient was hypertensive.  Labs were remarkable for hypokalemia (3.0), elevated troponin (0.161).  Initial EKG showed ST elevations in inferior leads with reciprocal changes in anterolateral leads that resolved on subsequent EKGs. She was started on heparin drip and a code STEMI was activated; cardiology thought this was more consistent with high risk NSTEMI with concern for stent thrombosis. Patient was taken emergently to the cath lab which showed prox-mid RCA stent thrombotic occlusion " (likely in the setting of inadvertent ASA discontinuation) s/p prox-mid RCA angioplasty. Patient was admitted for further management.     Past Medical History:   Diagnosis Date    AC (acromioclavicular) joint bone spurs, unspecified laterality     Coronary artery disease     Depression     Hyperlipidemia     Hypertension     Osteoarthritis     ST elevation (STEMI) myocardial infarction of unspecified site     Vaginal delivery     x2       Past Surgical History:   Procedure Laterality Date    ADENOIDECTOMY      BREAST SURGERY      biospy    CORONARY STENT PLACEMENT N/A 3/27/2024    Procedure: INSERTION, STENT, CORONARY ARTERY;  Surgeon: Vitaliy Brown MD;  Location: [facility] CATH LAB;  Service: Cardiology;  Laterality: N/A;  PCI circumflex, right common femoral artery access    HYSTERECTOMY      IVUS, CORONARY  3/27/2024    Procedure: IVUS, Coronary;  Surgeon: Vitaliy Brown MD;  Location: [facility] CATH LAB;  Service: Cardiology;;    JOINT REPLACEMENT Bilateral     knee    LEFT HEART CATHETERIZATION Left 3/25/2024    Procedure: Left heart cath;  Surgeon: Vitaliy Brown MD;  Location: [facility] CATH LAB;  Service: Cardiology;  Laterality: Left;    stent-heart      TONSILLECTOMY      TOTAL KNEE ARTHROPLASTY         Review of patient's allergies indicates:   Allergen Reactions    Effexor [venlafaxine] Hives, Shortness Of Breath and Other (See Comments)     Dry mouth and chest pain    Bactrim [sulfamethoxazole-trimethoprim]     Codeine Itching and Other (See Comments)     Tightness in throat       No current facility-administered medications on file prior to encounter.     Current Outpatient Medications on File Prior to Encounter   Medication Sig    amLODIPine (NORVASC) 5 MG tablet Take 1 tablet (5 mg total) by mouth once daily.    aspirin (ECOTRIN) 81 MG EC tablet Take 81 mg by mouth once daily.      atorvastatin (LIPITOR) 80 MG tablet     biotin 1 mg Cap Take 1 capsule by mouth once daily.    clonazePAM (KLONOPIN) 1 MG tablet  Take 1 tablet (1 mg total) by mouth 2 (two) times daily.    clopidogreL (PLAVIX) 75 mg tablet Take 1 tablet (75 mg total) by mouth once daily.    colchicine (COLCRYS) 0.6 mg tablet Take 1 tablet (0.6 mg total) by mouth once daily.    docusate sodium (COLACE) 100 MG capsule Take 1 capsule (100 mg total) by mouth 2 (two) times daily. Use while taking Norco to prevent constipation    isosorbide mononitrate (IMDUR) 30 MG 24 hr tablet Take 1 tablet (30 mg total) by mouth once daily.    metoprolol succinate (TOPROL-XL) 50 MG 24 hr tablet Take 1 tablet (50 mg total) by mouth once daily.    multivitamin (THERAGRAN) per tablet Take 1 tablet by mouth once daily.    nitroGLYCERIN (NITROSTAT) 0.4 MG SL tablet Place 1 tablet (0.4 mg total) under the tongue every 5 (five) minutes as needed for Chest pain.     Family History       Problem Relation (Age of Onset)    Heart disease Mother, Father          Tobacco Use    Smoking status: Never    Smokeless tobacco: Never   Substance and Sexual Activity    Alcohol use: No    Drug use: No    Sexual activity: Yes     Partners: Male     Review of Systems   Constitutional: Negative.    HENT: Negative.     Eyes: Negative.    Respiratory: Negative.     Cardiovascular:  Positive for chest pain.   Gastrointestinal: Negative.    Endocrine: Negative.    Genitourinary: Negative.    Musculoskeletal: Negative.    Skin: Negative.    Allergic/Immunologic: Negative.    Neurological: Negative.    Psychiatric/Behavioral: Negative.       Objective:     Vital Signs (Most Recent):  Temp: 97.8 °F (36.6 °C) (04/03/24 2245)  Pulse: 64 (04/03/24 2245)  Resp: (!) 22 (04/03/24 2245)  BP: (!) 150/69 (04/03/24 2245)  SpO2: 100 % (04/03/24 2245) Vital Signs (24h Range):  Temp:  [97.8 °F (36.6 °C)-98.6 °F (37 °C)] 97.8 °F (36.6 °C)  Pulse:  [64-73] 64  Resp:  [18-26] 22  SpO2:  [100 %] 100 %  BP: (150-176)/(69-81) 150/69     Weight: 67.6 kg (149 lb)  Body mass index is 29.1 kg/m².     Physical Exam  Vitals and  nursing note reviewed.   Constitutional:       General: She is not in acute distress.     Appearance: Normal appearance. She is not ill-appearing.   HENT:      Head: Normocephalic and atraumatic.      Nose: Nose normal.      Mouth/Throat:      Mouth: Mucous membranes are moist.   Eyes:      Extraocular Movements: Extraocular movements intact.   Cardiovascular:      Rate and Rhythm: Normal rate.      Pulses: Normal pulses.      Heart sounds: No murmur heard.  Pulmonary:      Effort: Pulmonary effort is normal. No respiratory distress.   Abdominal:      General: Abdomen is flat.      Palpations: Abdomen is soft.      Tenderness: There is no abdominal tenderness.   Musculoskeletal:      Right lower leg: No edema.      Left lower leg: No edema.      Comments: Right radial TR band applied   Skin:     General: Skin is warm.      Capillary Refill: Capillary refill takes less than 2 seconds.   Neurological:      General: No focal deficit present.      Mental Status: She is alert.   Psychiatric:         Mood and Affect: Mood normal.                Significant Labs: All pertinent labs within the past 24 hours have been reviewed.    Significant Imaging: I have reviewed all pertinent imaging results/findings within the past 24 hours.  Assessment/Plan:     * NSTEMI (non-ST elevated myocardial infarction)  Patient presents with chest pain.   Initial EKG showed ST elevations in inferior leads with reciprocal changes in anterolateral leads that resolved on subsequent EKGs. She was started on heparin drip and a code STEMI was activated; cardiology thought this was more consistent with high risk NSTEMI with concern for stent thrombosis.   Patient was taken emergently to the cath lab which showed prox-mid RCA stent thrombotic occlusion (likely in the setting of inadvertent ASA discontinuation) s/p prox-mid RCA angioplasty.    Plan:   Continue medical management   Cardiology consulted     Mixed hyperlipidemia  Continue  statin      Coronary artery disease involving native coronary artery of native heart with unstable angina pectoris  Patient with known CAD s/p stent placement, which is uncontrolled Will continue ASA, Plavix, and Statin and monitor for S/Sx of angina/ACS. Continue to monitor on telemetry.     Essential hypertension, benign  Chronic, controlled. Latest blood pressure and vitals reviewed-     Temp:  [98.6 °F (37 °C)]   Pulse:  [70-73]   Resp:  [18-26]   BP: (150-176)/(74-81)   SpO2:  [100 %] .   Home meds for hypertension were reviewed and noted below.   Hypertension Medications               amLODIPine (NORVASC) 5 MG tablet Take 1 tablet (5 mg total) by mouth once daily.    isosorbide mononitrate (IMDUR) 30 MG 24 hr tablet Take 1 tablet (30 mg total) by mouth once daily.    metoprolol succinate (TOPROL-XL) 50 MG 24 hr tablet Take 1 tablet (50 mg total) by mouth once daily.    nitroGLYCERIN (NITROSTAT) 0.4 MG SL tablet Place 1 tablet (0.4 mg total) under the tongue every 5 (five) minutes as needed for Chest pain.            While in the hospital, will manage blood pressure as follows; Continue home antihypertensive regimen    Will utilize p.r.n. blood pressure medication only if patient's blood pressure greater than 180/110 and she develops symptoms such as worsening chest pain or shortness of breath.      VTE Risk Mitigation (From admission, onward)           Ordered     IP VTE HIGH RISK PATIENT  Once         04/03/24 2252     Place sequential compression device  Until discontinued         04/03/24 2252                  Critical care time spent on the evaluation and treatment of severe organ dysfunction, review of pertinent labs and imaging studies, discussions with consulting providers and discussions with patient/family: 45 minutes.             AdmissionCare    Guideline: Myocardial Infarction, Inpatient    Based on the indications selected for the patient, the bed status of Inpatient was determined to be  MET    The following indications were selected as present at the time of evaluation of the patient:      - Acute myocardial infarction (MI) (not in context of cardiac procedure within last 48 hours), as indicated by ALL of the following:    - Elevated cardiac troponin level, as indicated by 1 or more of the following:     - New or presumed new elevation of cardiac troponin level (ie, elevation clearly not due to chronic condition)    - Myocardial injury due to acute ischemia, as indicated by 1 or more of the following:     - Symptoms consistent with myocardial ischemia (eg, chest pain, dyspnea)    AdmissionCare documentation entered by: Mirta Quach    [facility] [employer], 27th edition, Copyright © 2023 [facility] [employer], [employer] All Rights Reserved.  [id number]:00    Carlos Land MD  Department of Hospital Medicine  [facility] - Intensive Care

## 2024-04-04 NOTE — ASSESSMENT & PLAN NOTE
Patient with known CAD s/p stent placement, which is uncontrolled Will continue ASA, Plavix, and Statin and monitor for S/Sx of angina/ACS. Continue to monitor on telemetry.

## 2024-04-04 NOTE — ED PROVIDER NOTES
"Encounter Date: 4/3/2024    SCRIBE #1 NOTE: I, Nadeen Ferrari, am scribing for, and in the presence of,  Johnny Hernandes MD. I have scribed the following portions of the note - Other sections scribed: HPI, ROS, PE.       History     Chief Complaint   Patient presents with    Chest Pain     Patient presents to ED via EMS [facility] secondary to chest pain x1 hour which began while at rest. Had 2 stents placed by Dr Botello one week ago. Midsternal and radiates to back. Described as "pressure" and "spreading". Denies accompanying symptoms. Patient received 324 mg aspirin and 1 sL nitro. Rates pain at 6/10 at worse and now 2/10.      Ms. Elizabeth Ayala presents to the ED with chest pain and associated pressure that began 1 hour PTA. She reports the pain radiates to her back and describes it as "traveling" across her chest. Additional history provided by independent historian, EMS, who states pt was given 1 nitro and 324 mg Asprin en route. States pt initially rated the pain an 8/10, then after recieving the nitro rated it a 6/10. At time of exam she rates the pain 2/10. States she had 2 stents placed 1 week ago. PMHx significant for CAD, HTN, HLD. No other exacerbating or alleviating factors. Denies nausea, vomiting, diaphoresis, or other associated symptoms.      The history is provided by the patient and the EMS personnel. No  was used.     Review of patient's allergies indicates:   Allergen Reactions    Effexor [venlafaxine] Hives, Shortness Of Breath and Other (See Comments)     Dry mouth and chest pain    Bactrim [sulfamethoxazole-trimethoprim]     Codeine Itching and Other (See Comments)     Tightness in throat     Past Medical History:   Diagnosis Date    AC (acromioclavicular) joint bone spurs, unspecified laterality     Coronary artery disease     Depression     Hyperlipidemia     Hypertension     Osteoarthritis     ST elevation (STEMI) myocardial infarction of unspecified site     " Vaginal delivery     x2     Past Surgical History:   Procedure Laterality Date    ADENOIDECTOMY      BREAST SURGERY      biospy    CORONARY STENT PLACEMENT N/A 3/27/2024    Procedure: INSERTION, STENT, CORONARY ARTERY;  Surgeon: Vitaliy Brown MD;  Location: [facility] CATH LAB;  Service: Cardiology;  Laterality: N/A;  PCI circumflex, right common femoral artery access    HYSTERECTOMY      IVUS, CORONARY  3/27/2024    Procedure: IVUS, Coronary;  Surgeon: Vitaliy Brown MD;  Location: [facility] CATH LAB;  Service: Cardiology;;    JOINT REPLACEMENT Bilateral     knee    LEFT HEART CATHETERIZATION Left 3/25/2024    Procedure: Left heart cath;  Surgeon: Vitaliy Brown MD;  Location: [facility] CATH LAB;  Service: Cardiology;  Laterality: Left;    stent-heart      TONSILLECTOMY      TOTAL KNEE ARTHROPLASTY       Family History   Problem Relation Age of Onset    Heart disease Mother     Heart disease Father      Social History     Tobacco Use    Smoking status: Never    Smokeless tobacco: Never   Substance Use Topics    Alcohol use: No    Drug use: No     Review of Systems   Constitutional:  Negative for diaphoresis.   Respiratory:  Positive for chest tightness.    Cardiovascular:  Positive for chest pain.   Gastrointestinal:  Negative for nausea and vomiting.   All other systems reviewed and are negative.      Physical Exam     Initial Vitals   BP Pulse Resp Temp SpO2   04/03/24 2016 04/03/24 2016 04/03/24 2016 04/03/24 2025 04/03/24 2016   (!) 150/80 73 18 98.6 °F (37 °C) 100 %      MAP       --                Physical Exam    Nursing note and vitals reviewed.  Constitutional: She appears well-developed and well-nourished. She is not diaphoretic. No distress.   HENT:   Head: Normocephalic and atraumatic.   Eyes: Conjunctivae and EOM are normal.   Cardiovascular:  Normal rate, regular rhythm and normal heart sounds.           No murmur heard.  Pulmonary/Chest: Breath sounds normal. No respiratory distress.   Abdominal: Abdomen is  soft. Bowel sounds are normal. She exhibits no distension and no mass. There is no abdominal tenderness. There is no guarding.   Musculoskeletal:         General: No tenderness or edema. Normal range of motion.     Neurological: She is oriented to person, place, and time. GCS score is 15. GCS eye subscore is 4. GCS verbal subscore is 5. GCS motor subscore is 6.   Lucid and linear.    Skin: Skin is warm and dry. No rash noted. No erythema.   Psychiatric: She has a normal mood and affect. Her behavior is normal. Judgment and thought content normal.         ED Course   Critical Care    Date/Time: 4/3/2024 10:35 PM    Performed by: Johnny Hernandes MD  Authorized by: Naomi Bar MD  Direct patient critical care time: 10 minutes  Additional history critical care time: 5 minutes  Ordering / reviewing critical care time: 5 minutes  Documentation critical care time: 5 minutes  Consulting other physicians critical care time: 5 minutes  Total critical care time (exclusive of procedural time) : 30 minutes        Labs Reviewed   CBC W/ AUTO DIFFERENTIAL - Abnormal; Notable for the following components:       Result Value    Hemoglobin 11.3 (*)     Hematocrit 32.8 (*)     MCV 81 (*)     MPV 9.0 (*)     All other components within normal limits   APTT    Narrative:     Draw baseline aPTT prior to starting the heparin bolus or  infusion  (if patient is on warfarin prior to heparin therapy)   PROTIME-INR    Narrative:     Draw baseline aPTT prior to starting the heparin bolus or  infusion  (if patient is on warfarin prior to heparin therapy)          Imaging Results              X-Ray Chest AP Portable (Final result)  Result time 04/03/24 20:44:22      Final result by Marquise Barroso DO (04/03/24 20:44:22)                   Impression:      No acute abnormality.      Electronically signed by: Marquise Barroso  Date:    04/03/2024  Time:    20:44               Narrative:    EXAMINATION:  XR CHEST AP  PORTABLE    CLINICAL HISTORY:  Chest pain, unspecified    TECHNIQUE:  Single frontal view of the chest was performed.    COMPARISON:  04/29/2015.    FINDINGS:  The lungs are well expanded and clear. No focal opacities are seen. The pleural spaces are clear. The cardiac silhouette is unremarkable.  There are calcifications of the aortic arch.  The visualized osseous structures demonstrate generative changes.                                       Medications   heparin 25,000 units in dextrose 5% 250 mL (100 units/mL) infusion LOW INTENSITY nomogram - [facility] (0 Units/kg/hr × 54.3 kg (Adjusted) Intravenous Paused 4/3/24 2100)   heparin 25,000 units in dextrose 5% (100 units/ml) IV bolus from bag LOW INTENSITY nomogram - [facility] (has no administration in time range)   heparin 25,000 units in dextrose 5% (100 units/ml) IV bolus from bag LOW INTENSITY nomogram - [facility] (has no administration in time range)   aspirin tablet 325 mg (325 mg Oral Not Given 4/3/24 2045)   0.9%  NaCl infusion (has no administration in time range)   fentaNYL 50 mcg/mL injection (50 mcg Intravenous Given 4/3/24 2204)   midazolam (VERSED) 1 mg/mL injection (0.5 mg Intravenous Given 4/3/24 2156)   LIDOcaine (PF) 10 mg/ml (1%) injection (1 mL Subcutaneous Given 4/3/24 2112)   heparin (porcine) injection (2,500 Units Intra-arterial Given 4/3/24 2112)   verapamiL injection (2.5 mg Intra-arterial Given 4/3/24 2113)   nitroGLYCERIN in D5W 200 mcg/mL vial (200 mcg Intra-arterial Given 4/3/24 2113)   sodium chloride 0.9% bolus ( Intravenous Restarted 4/3/24 2148)   nitroGLYCERIN in D5W 200 mcg/mL vial (300 mcg Intra-arterial Given 4/3/24 2202)   heparin (porcine) injection (2,000 Units Intravenous Given 4/3/24 2131)   atropine injection (0.5 mg Intravenous Given 4/3/24 2143)   iohexoL (OMNIPAQUE 350) injection (85 mLs Intra-arterial Given 4/3/24 2211)   amLODIPine tablet 5 mg (has no administration in time range)   atorvastatin tablet 80 mg (has no administration in  time range)   isosorbide mononitrate 24 hr tablet 30 mg (has no administration in time range)   metoprolol succinate (TOPROL-XL) 24 hr tablet 50 mg (has no administration in time range)   aspirin EC tablet 81 mg (has no administration in time range)   atropine injection 0.5 mg (has no administration in time range)   sodium chloride 0.9% bolus 250 mL 250 mL (has no administration in time range)   acetaminophen tablet 650 mg (has no administration in time range)   morphine injection 2 mg (has no administration in time range)   ondansetron disintegrating tablet 8 mg (has no administration in time range)   sodium chloride 0.9% bolus 1,400 mL 1,400 mL (has no administration in time range)   clopidogreL tablet 75 mg (has no administration in time range)   nitroGLYCERIN SL tablet 0.4 mg (has no administration in time range)   heparin 25,000 units in dextrose 5% (100 units/ml) IV bolus from bag LOW INTENSITY nomogram - [facility] (3,250 Units Intravenous Bolus from Bag 4/3/24 2036)     Medical Decision Making  78-year-old female with recent stent placement x2 for STEMI 1 week ago.  Sudden onset chest pressure at rest this evening.  Now resolved.  Nitro and aspirin given in route.  Patient initially had concerning EKG by EMS with ST elevation inferiorly with some reciprocal depression.  EKG in ED is much more reassuring and repeat EKG in ED shows resolution of ST elevation.  Based on EMS EKG, cath lab was activated.  Dr. Marcum came to the bedside quickly and this is deemed an NSTEMI due to resolution of ST abnormalities.  Given patient's high risk, she will be taken emergently to the cath lab for evaluation and possible intervention if necessary.  Pt will be admitted to  for further holistic care with consult to cardiology.   Heparin bolus and infusion ordered in ED. Pt understands plan and has been hemodynamically stable during her time in the er.     Amount and/or Complexity of Data Reviewed  Labs: ordered.  Radiology:  ordered.  ECG/medicine tests: ordered and independent interpretation performed.     Details: Less than one box ST elevation. III in AVF, T-wave inversion in V2-V6. Similar when compared to 3/25/2024, new compared with most recent EKG at recent discharge.     Risk  OTC drugs.  Prescription drug management.  Decision regarding hospitalization.            Scribe Attestation:   Scribe #1: I performed the above scribed service and the documentation accurately describes the services I performed. I attest to the accuracy of the note.                               Clinical Impression:  Final diagnoses:  [R07.9] Chest pain  [R07.89] Chest heaviness  [I21.4] NSTEMI (non-ST elevated myocardial infarction) (Primary)          ED Disposition Condition    Admit Serious             I, Johnny Hernandes MD, personally performed the services described in this documentation. All medical record entries made by the scribe were at my direction and in my presence.  I have reviewed the chart and agree that the record reflects my personal performance and is accurate and complete.      Johnny Hernandes MD  04/03/24 1150     Flap Thinning Complex Repair Preamble Text (Leave Blank If You Do Not Want): An incision was made along the previous flap suture line. Undermining was performed beneath the flap and redundant tissue was removed to restore the normal contour of the skin.

## 2024-04-04 NOTE — ASSESSMENT & PLAN NOTE
Concerning symptoms which replicated her presentation on 03/25/2024 for STEMI.    EKG with dynamic ST elevation, now abated with resolution of chest pain.  MV PCI (RCA and LCx) with unrevascularized LAD disease.  Patient tells me she has not been taking aspirin.  She has been taking clopidogrel.  Cath 4/3/24: AST of RCA stents (placed 3/25/24) in setting of inadvertent ASA discontinuation  Successful IVUS guided PCI prox-mid RCA POBA  Plan:  ASA 81mg qd indefinitely  Plavix 75mg qd for 1 year (thru Apr 2025), likely indef rx  Statin  Check echo  Outpat card rehab  Follow up with Dr. Brown after discharge.

## 2024-04-04 NOTE — NURSING
[facility] Intensive Care  ICU Shift Summary  Date: 4/4/2024      Prehospitalization: Home  Admit Date / LOS : 4/3/2024/ 1 days    Diagnosis: NSTEMI (non-ST elevated myocardial infarction)    Consults:        Active: Cardio       Needed: N/A     Code Status: Full Code   Advanced Directive: <no information>    LDA:  Lines/Drains/Airways       Drain  Duration             Female External Urinary Catheter w/ Suction 04/03/24 2245 <1 day              Peripheral Intravenous Line  Duration                  Peripheral IV - Single Lumen 04/03/24 2018 22 G Right Antecubital <1 day         Peripheral IV - Single Lumen 04/03/24 2025 20 G Posterior;Proximal;Right Forearm <1 day                  Central Lines/Site/Justification:Patient Does Not Have Central Line  Urinary Cath/Order/Justification:Patient Does Not Have Urinary Catheter    Vasopressors/Infusions:        GOALS: Volume/ Hemodynamic: N/A                     RASS: 0  alert and calm    Pain Management: none       Pain Controlled: yes     Rhythm: NSR    Respiratory Device: Room Air                      Most Recent SBT/ SAT: Pass       MOVE Screen: PASS  ICU Liberation: yes    VTE Prophylaxis: Ambulation  Mobility: Ambulatory  Stress Ulcer Prophylaxis: No    Isolation: No active isolations    Dietary:   Current Diet Order   Procedures    Diet Cardiac Standard Tray     Order Specific Question:   Tray type:     Answer:   Standard Tray      Tolerance: yes  Advancement: yes    I & O (24h):    Intake/Output Summary (Last 24 hours) at 4/4/2024 0551  Last data filed at 4/4/2024 0549  Gross per 24 hour   Intake 2461.71 ml   Output 1200 ml   Net 1261.71 ml        Restraints: No    Significant Dates:  Post Op Date: N/A  Rescue Date: N/A  Imaging/ Diagnostics: N/A    Noteworthy Labs:  K 3.4    COVID Test: (--)  CBC/Anemia Labs: Coags:    Recent Labs   Lab 04/03/24 2031 04/04/24  0423   WBC 8.93 7.49   HGB 11.3* 11.4*   HCT 32.8* 34.8*    307   MCV 81* 85   RDW 13.5 13.6     "Recent Labs   Lab 04/03/24 2031   INR 1.0   APTT 27.0        Chemistries:   Recent Labs   Lab 04/03/24 2031 04/04/24  0423    141   K 3.0* 3.4*   * 110   CO2 18* 21*   BUN 13 8   CREATININE 0.8 0.6   CALCIUM 9.3 8.0*   PROT 6.2  --    BILITOT 0.7  --    ALKPHOS 105  --    ALT 10  --    AST 15  --         Cardiac Enzymes: Ejection Fractions:    Recent Labs     04/03/24 2031 04/03/24  2250   TROPONINI 0.161* 1.996*    No results found for: "EF"     POCT Glucose: HbA1c:    Recent Labs   Lab 03/28/24  0728 03/28/24  1149   POCTGLUCOSE 107 102    Hemoglobin A1C   Date Value Ref Range Status   12/11/2007 5.5 4.5 - 6.2 % Final     Hemoglobin A1c   Date Value Ref Range Status   03/25/2021 4.9 <5.7 % of total Hgb Final     Comment:     For the purpose of screening for the presence of  diabetes:    <5.7%       Consistent with the absence of diabetes  5.7-6.4%    Consistent with increased risk for diabetes              (prediabetes)  > or =6.5%  Consistent with diabetes    This assay result is consistent with a decreased risk  of diabetes.    Currently, no consensus exists regarding use of  hemoglobin A1c for diagnosis of diabetes in children.    According to American Diabetes Association (ADA)  guidelines, hemoglobin A1c <7.0% represents optimal  control in non-pregnant diabetic patients. Different  metrics may apply to specific patient populations.   Standards of Medical Care in Diabetes(ADA).               ICU LOS 7h  Level of Care: OK to Transfer    Chart Check: 24 HR Done  Shift Summary/Plan for the shift: TBan removed as ordered. No complaints of chest pain at present.Resting in bed peacefully, side rails up x3 with call light within reach. No acute distress noted, safety maintained. K replacement given.  "

## 2024-04-04 NOTE — ADMISSIONCARE
AdmissionCare    Guideline: Myocardial Infarction, Inpatient    Based on the indications selected for the patient, the bed status of Inpatient was determined to be MET    The following indications were selected as present at the time of evaluation of the patient:      - Acute myocardial infarction (MI) (not in context of cardiac procedure within last 48 hours), as indicated by ALL of the following:    - Elevated cardiac troponin level, as indicated by 1 or more of the following:     - New or presumed new elevation of cardiac troponin level (ie, elevation clearly not due to chronic condition)    - Myocardial injury due to acute ischemia, as indicated by 1 or more of the following:     - Symptoms consistent with myocardial ischemia (eg, chest pain, dyspnea)    AdmissionCare documentation entered by: Mirta Quach    [facility], 27th edition, Copyright © 2023 [facility] [place], [facility] All Rights Reserved.  [id number]:20:09-05:00

## 2024-04-04 NOTE — CONSULTS
"[facility] Emergency Dept  Cardiology  Consult Note    Patient Name: Elizabeth Ayala  MRN: [id number]  Admission Date: 4/3/2024  Hospital Length of Stay: 0 days  Code Status: Prior   Attending Provider: Johnny Hernandes MD   Consulting Provider: Cl Rodriguez MD  Primary Care Physician: Amna Levy MD  Principal Problem:NSTEMI (non-ST elevated myocardial infarction)    Patient information was obtained from patient and ER records.     Inpatient consult to Cardiology  Consult performed by: Cl Rodriguez MD  Consult ordered by: Johnny Hernandes MD  Reason for consult: CP/NSTEMI           Subjective:     Chief Complaint:  CP     HPI:   78F presents to the ED with chest pain and associated pressure that began 1 hour PTA. She reports the pain radiates to her back and describes it as "traveling" across her chest. Additional history provided by independent historian, EMS, who states pt was given 1 nitro and 324 mg Asprin en route. States pt initially rated the pain an 8/10, then after recieving the nitro rated it a 6/10. At time of exam she rates the pain 2/10. States she had 2 stents placed 1 week ago. PMHx significant for CAD, HTN, HLD. No other exacerbating or alleviating factors. Denies nausea, vomiting, diaphoresis, or other associated symptoms.      Pt follows with Dr. Brown, seen last week for STEMi s/p MV PCI.    Cardiology consulted for CP with transient AHMET.    The patient is a pleasant 78-year-old woman who presents complaining of severe anterior chest discomfort similar to that for her STEMI in late March 2024.  She presented with an inferior myocardial infarction.  Review of the electrocardiogram at that time noted ST elevation limited to lead III with reciprocal changes elsewhere.  EKG by EMS today noted a similar pattern.  She was given 325 mg of aspirin EN route, and her pain began to marcelo and is now essentially resolved with resolution of her ST elevations.  On review with the patient, " she tells me she was never instructed to take aspirin at discharge.  She has been taking Plavix.  She underwent PCI of her RCA for the index procedure and staged PCI of her circumflex.  Diffuse mid LAD disease was noted for which medical therapy has been planned.  Given concerns for stent thrombosis, I have suggested we proceed to the cath lab in order to exclude this potentially fatal complication.  Pros and cons of angiography were discussed with the patient she agrees to proceed.  Permit was signed.          Past Medical History:   Diagnosis Date    AC (acromioclavicular) joint bone spurs, unspecified laterality     Coronary artery disease     Depression     Hyperlipidemia     Hypertension     Osteoarthritis     Vaginal delivery     x2       Past Surgical History:   Procedure Laterality Date    ADENOIDECTOMY      BREAST SURGERY      biospy    CORONARY STENT PLACEMENT N/A 3/27/2024    Procedure: INSERTION, STENT, CORONARY ARTERY;  Surgeon: Vitaliy Brown MD;  Location: [facility] CATH LAB;  Service: Cardiology;  Laterality: N/A;  PCI circumflex, right common femoral artery access    HYSTERECTOMY      IVUS, CORONARY  3/27/2024    Procedure: IVUS, Coronary;  Surgeon: iVtaliy Brown MD;  Location: [facility] CATH LAB;  Service: Cardiology;;    JOINT REPLACEMENT Bilateral     knee    LEFT HEART CATHETERIZATION Left 3/25/2024    Procedure: Left heart cath;  Surgeon: Vitaliy Brown MD;  Location: [facility] CATH LAB;  Service: Cardiology;  Laterality: Left;    stent-heart      TONSILLECTOMY      TOTAL KNEE ARTHROPLASTY         Review of patient's allergies indicates:   Allergen Reactions    Effexor [venlafaxine] Hives, Shortness Of Breath and Other (See Comments)     Dry mouth and chest pain    Bactrim [sulfamethoxazole-trimethoprim]     Codeine Itching and Other (See Comments)     Tightness in throat       No current facility-administered medications on file prior to encounter.     Current Outpatient Medications on File Prior to  Encounter   Medication Sig    amLODIPine (NORVASC) 5 MG tablet Take 1 tablet (5 mg total) by mouth once daily.    aspirin (ECOTRIN) 81 MG EC tablet Take 81 mg by mouth once daily.      atorvastatin (LIPITOR) 80 MG tablet     biotin 1 mg Cap Take 1 capsule by mouth once daily.    clonazePAM (KLONOPIN) 1 MG tablet Take 1 tablet (1 mg total) by mouth 2 (two) times daily.    clopidogreL (PLAVIX) 75 mg tablet Take 1 tablet (75 mg total) by mouth once daily.    colchicine (COLCRYS) 0.6 mg tablet Take 1 tablet (0.6 mg total) by mouth once daily.    docusate sodium (COLACE) 100 MG capsule Take 1 capsule (100 mg total) by mouth 2 (two) times daily. Use while taking Norco to prevent constipation    isosorbide mononitrate (IMDUR) 30 MG 24 hr tablet Take 1 tablet (30 mg total) by mouth once daily.    metoprolol succinate (TOPROL-XL) 50 MG 24 hr tablet Take 1 tablet (50 mg total) by mouth once daily.    multivitamin (THERAGRAN) per tablet Take 1 tablet by mouth once daily.    nitroGLYCERIN (NITROSTAT) 0.4 MG SL tablet Place 1 tablet (0.4 mg total) under the tongue every 5 (five) minutes as needed for Chest pain.     Family History       Problem Relation (Age of Onset)    Heart disease Mother, Father          Tobacco Use    Smoking status: Never    Smokeless tobacco: Never   Substance and Sexual Activity    Alcohol use: No    Drug use: No    Sexual activity: Yes     Partners: Male     Review of Systems   Constitutional: Negative for chills, diaphoresis, fever and malaise/fatigue.   HENT:  Negative for nosebleeds.    Eyes:  Negative for blurred vision and double vision.   Cardiovascular:  Positive for chest pain. Negative for claudication, cyanosis, dyspnea on exertion, leg swelling, orthopnea, palpitations, paroxysmal nocturnal dyspnea and syncope.   Respiratory:  Negative for cough, shortness of breath and wheezing.    Skin:  Negative for dry skin and poor wound healing.   Musculoskeletal:  Negative for back pain, joint swelling  and myalgias.   Gastrointestinal:  Negative for abdominal pain, nausea and vomiting.   Genitourinary:  Negative for hematuria.   Neurological:  Negative for dizziness, headaches, numbness, seizures and weakness.   Psychiatric/Behavioral:  Negative for altered mental status and depression.      Objective:     Vital Signs (Most Recent):  Temp: 98.6 °F (37 °C) (04/03/24 2025)  Pulse: 70 (04/03/24 2021)  Resp: 18 (04/03/24 2016)  BP: (!) 176/74 (04/03/24 2021)  SpO2: 100 % (04/03/24 2021) Vital Signs (24h Range):  Temp:  [98.6 °F (37 °C)] 98.6 °F (37 °C)  Pulse:  [70-73] 70  Resp:  [18] 18  SpO2:  [100 %] 100 %  BP: (150-176)/(74-80) 176/74     Weight: 67.6 kg (149 lb)  Body mass index is 29.1 kg/m².    SpO2: 100 %       No intake or output data in the 24 hours ending 04/03/24 2042    Lines/Drains/Airways       Peripheral Intravenous Line  Duration                  Peripheral IV - Single Lumen 04/03/24 2018 22 G Right Antecubital <1 day         Peripheral IV - Single Lumen 04/03/24 2025 20 G Posterior;Proximal;Right Forearm <1 day                     Physical Exam  Constitutional:       General: She is not in acute distress.     Appearance: She is well-developed. She is not ill-appearing, toxic-appearing or diaphoretic.   HENT:      Head: Normocephalic and atraumatic.   Eyes:      General: No scleral icterus.     Extraocular Movements: Extraocular movements intact.      Conjunctiva/sclera: Conjunctivae normal.      Pupils: Pupils are equal, round, and reactive to light.   Neck:      Vascular: No JVD.      Trachea: No tracheal deviation.   Cardiovascular:      Rate and Rhythm: Normal rate and regular rhythm.      Pulses:           Radial pulses are 2+ on the right side.      Heart sounds: S1 normal and S2 normal. No murmur heard.     No friction rub. No gallop.   Pulmonary:      Effort: Pulmonary effort is normal. No respiratory distress.      Breath sounds: Normal breath sounds. No stridor. No wheezing, rhonchi or  rales.   Chest:      Chest wall: No tenderness.   Abdominal:      General: There is no distension.      Palpations: Abdomen is soft.   Musculoskeletal:         General: No swelling or tenderness. Normal range of motion.      Cervical back: Normal range of motion and neck supple. No rigidity.      Right lower leg: No edema.      Left lower leg: No edema.   Skin:     General: Skin is warm and dry.      Coloration: Skin is not jaundiced.   Neurological:      General: No focal deficit present.      Mental Status: She is alert and oriented to person, place, and time.      Cranial Nerves: No cranial nerve deficit.   Psychiatric:         Mood and Affect: Mood normal.         Behavior: Behavior normal.          Current Medications:   aspirin  325 mg Oral ED 1 Time      heparin (porcine) in D5W 12 Units/kg/hr (04/03/24 2035)     heparin (PORCINE), heparin (PORCINE)    Laboratory (all labs reviewed):  CBC:  Recent Labs   Lab 03/25/24  1109 03/26/24  0440 03/26/24 1912 03/27/24 0215 04/03/24 2031   WBC 9.50 9.73 9.98 9.99 8.93   Hemoglobin 16.0 14.5 13.3 13.6 11.3 L   Hematocrit 46.0 42.5 39.2 40.9 32.8 L   Platelets 306 253 264 235 366       CHEMISTRIES:  Recent Labs   Lab 04/06/23  1021 05/17/23  1258 03/25/24  1109 03/26/24  0440 03/27/24 0624   Glucose  --   --  110 105 117 H   Sodium 138 141 139 140 137   Potassium 3.7 3.9 3.2 L 3.9 3.9   BUN 16.7 20.1 H 9 14 19   Creatinine 0.76 0.70 0.9 0.9 0.7   eGFR  --   --  >60 >60 >60   Calcium 9.1 8.8 10.3 9.7 9.2       CARDIAC BIOMARKERS:  Recent Labs   Lab 03/25/24  1109 03/25/24  2257 03/26/24  0748 03/26/24 1912   Troponin I 0.128 H 0.136 H 0.242 H 0.229 H       COAGS:  Recent Labs   Lab 03/25/24  1124 03/26/24 1912   INR 1.0 1.0       LIPIDS/LFTS:  Recent Labs   Lab 01/26/23  1413 03/25/24  1109 03/26/24  0440   AST 24 24 14   ALT 25 16 14       BNP:  Recent Labs   Lab 03/25/24  1109    H       TSH:        Free T4:        Diagnostic Results:  ECG (personally  reviewed and interpreted tracing(s)):  4/3/24 EMS tracing SR with ?AHMET III, recip lat TWI/ST depression, similar to 3/25/24  4/3/24 2021 SR 70, AHMET III improved, ?hyperacute T waves    Chest X-Ray (personally reviewed and interpreted image(s)): 4/3/24 NAD    Echo: 3/25/24 (repeat ordered)    Left Ventricle: There is concentric remodeling. There is normal systolic function with a visually estimated ejection fraction of 55 - 60%. Grade I diastolic dysfunction.    Right Ventricle: Normal right ventricular cavity size. Systolic function is normal.    Left Atrium: Left atrium is mildly dilated.    Aortic Valve: There is mild aortic regurgitation.    Tricuspid Valve: There is mild regurgitation.    Pulmonary Artery: The estimated pulmonary artery systolic pressure is 24 mmHg.    IVC/SVC: Normal venous pressure at 3 mmHg.    Cath/PCI LCx: 3/27/24 (images personally reviewed and interpreted)    Successful IVUS guided PCI of prox to mid circ with COLTON x2 with excellent angiographic results.  IVUS post PCI revealed well-opposed and expanded stents.    The Prox Cx lesion was 95% stenosed with 0% stenosis post-intervention.    The Mid Cx lesion was 80% stenosed with 0% stenosis post-intervention.    Prox Cx lesion: A STENT SYNERGY XD [id number] stent was successfully placed at 12 ROCÍO for 15 sec.    Mid Cx lesion: A stent was successfully placed at 12 ROCÍO for 9 sec.    The estimated blood loss was <50 mL.  Left main:  No significant stenosis  Lad:  Mid to distal long 80% stenosis.  Considering the length of this lesion and the diffusely diseased LAD, consider medical management and PCI only if the patient continues to have lifestyle limiting angina despite maximum medical management   Circumflex:  Proximal 95% and mid 80% stenosis.  Successful PCI with COLTON x2   RCA: Previously placed RCA stents are widely patent.  Access:  Right common femoral artery access.  Remove sheath when ACT less than 160  Assessment and plan   Continue  aspirin 81 mg daily indefinitely  Plavix 75 mg daily for at least 1 year and longer if no contraindication   Statins and aggressive risk factor modification    Cath/PCI RCA 3/25/24    There was three vessel coronary artery disease.    The Prox RCA lesion was 99% stenosed with 0% stenosis post-intervention.  This was the culprit vessel for the patient's myocardial infarction    Prox RCA lesion: A STENT SYNERGY XD [id number] stent was successfully placed at 16 ROCÍO for 15 sec.    The estimated blood loss was <50 mL.  Left main:  No significant stenosis  Lad: Mid to distal small diffusely diseased vessel  Circumflex:  Proximal 95%, mid 80% stenosis   RCA:  Culprit vessel:  Proximal heavily calcified subtotal stenosis proximal to previously placed stent.  Successful PCI performed with restoration of DAVID 3 flow and excellent angiographic results.  Resolution of chest pain at end of procedure  Assessment plan   Continue aspirin Plavix statins aggressive risk-factor modification      Assessment and Plan:     * NSTEMI (non-ST elevated myocardial infarction)  Concerning symptoms which replicated her presentation on 03/25/2024 for STEMI.    EKG with dynamic ST elevation, now abated with resolution of chest pain.  MV PCI (RCA and LCx) with unrevascularized LAD disease.  Patient tells me she has not been taking aspirin.  She has been taking clopidogrel.  Pros and cons of coronary angiography discussed with the patient she agrees to proceed.  Permit has been signed.  Admit to ICU on hospital medicine service.  Check echo in am  Cont statin.    Mixed hyperlipidemia  Cont statin      Hypertension  Cont med rx    Coronary artery disease involving native coronary artery of native heart with unstable angina pectoris  As above    Essential hypertension, benign  Cont med rx        VTE Risk Mitigation (From admission, onward)           Ordered     heparin 25,000 units in dextrose 5% (100 units/ml) IV bolus from bag LOW INTENSITY nomogram  - [facility]  As needed (PRN)        Question:  Heparin Infusion Adjustment (DO NOT MODIFY ANSWER)  Answer:  [email or web address]\Images\Pharmacy\HeparinInfusions\heparin LOW INTENSITY nomogram for [facility] [id number]    04/03/24 2028     heparin 25,000 units in dextrose 5% (100 units/ml) IV bolus from bag LOW INTENSITY nomogram - [facility]  As needed (PRN)        Question:  Heparin Infusion Adjustment (DO NOT MODIFY ANSWER)  Answer:  [email or web address]\epic\Images\Pharmacy\HeparinInfusions\heparin LOW INTENSITY nomogram for [facility] [id number].pdf    04/03/24 2028     heparin 25,000 units in dextrose 5% 250 mL (100 units/mL) infusion LOW INTENSITY nomogram - [facility]  Continuous        Question:  Begin at (units/kg/hr)  Answer:  12    04/03/24 2028                  Critical care time 35min.    Thank you for your consult. I will follow-up with patient. Please contact us if you have any additional questions.    Cl Rodriguez MD  Cardiology   [facility] [place] - Emergency Dept

## 2024-04-04 NOTE — NURSING
[facility] [place]  Nurses Note -- 4 Eyes      4/4/2024       Skin assessed on: Admit      [x] No Pressure Injuries Present    [x]Prevention Measures Documented    [] Yes LDA  for Pressure Injury Previously documented     [] Yes New Pressure Injury Discovered   [] LDA for New Pressure Injury Added      Attending RN:  Snow Alvarenga RN     Second RN:  Yamilet BECK

## 2024-04-04 NOTE — PROVIDER TRANSFER
Patient is admitted with NSTEMI with dynamic ST elevation..  Initial EKG showed ST elevations in inferior leads with reciprocal changes in anterolateral leads that resolved on subsequent EKGs. She was started on heparin drip and a code STEMI was activated; cardiology thought this was more consistent with high risk NSTEMI with concern for stent thrombosis.   Patient was taken emergently to the cath lab which showed prox-mid RCA stent thrombotic occlusion (likely in the setting of inadvertent ASA discontinuation) s/p prox-mid RCA angioplasty.    Plan:   Aspirin 81 mg once daily indefinitely   Plavix 75 mg once daily for 1 year  Lipitor 80 od  Echo pending  Outpatient cardiac rehab and follow up with Dr. Brown at discharge    Patient had 1 episode of hematuria this morning in ICU.  No further episodes of bleeding.  Monitor H/H and further episodes of hematuria

## 2024-04-04 NOTE — ASSESSMENT & PLAN NOTE
Patient presents with chest pain.   Initial EKG showed ST elevations in inferior leads with reciprocal changes in anterolateral leads that resolved on subsequent EKGs. She was started on heparin drip and a code STEMI was activated; cardiology thought this was more consistent with high risk NSTEMI with concern for stent thrombosis.   Patient was taken emergently to the cath lab which showed prox-mid RCA stent thrombotic occlusion (likely in the setting of inadvertent ASA discontinuation) s/p prox-mid RCA angioplasty.    Plan:   Continue medical management   Cardiology consulted

## 2024-04-04 NOTE — PLAN OF CARE
Case Management Assessment     PCP: Amna Levy MD    Pharmacy: [facility]      Patient Arrived From: home  Existing Help at Home: daughter and granddaughter will help as needed    Barriers to Discharge: none    Discharge Plan:    A. Home health   B. Home       Patient lives alone.  She is independent and drives.  She readmitted after one week.  She admitted that she was not taking her Asprin following stent placement.  She would benefit from a  nurse for medication management at time of DC.          04/04/24 5779   Discharge Assessment   Assessment Type Discharge Planning Assessment   Confirmed/corrected address, phone number and insurance Yes   Confirmed Demographics Correct on Facesheet   Source of Information patient   Communicated LO with patient/caregiver Date not available/Unable to determine   People in Home alone   Do you expect to return to your current living situation? Yes   Do you have help at home or someone to help you manage your care at home? Yes   Prior to hospitilization cognitive status: Alert/Oriented   Current cognitive status: Alert/Oriented   Walking or Climbing Stairs Difficulty no   Dressing/Bathing Difficulty no   Home Layout Able to live on 1st floor   Equipment Currently Used at Home none   Readmission within 30 days? Yes   Patient currently being followed by outpatient case management? No   Do you currently have service(s) that help you manage your care at home? No   Do you take prescription medications? Yes   Do you have prescription coverage? Yes   Do you have any problems affording any of your prescribed medications? No   How do you get to doctors appointments? car, drives self   Are you on dialysis? No   Do you take coumadin? No   Discharge Plan A Home Health   Discharge Plan B Home   DME Needed Upon Discharge  none   Discharge Plan discussed with: Adult children;Patient  (Daughter, Deanne at bedside)   Transition of Care Barriers None

## 2024-04-04 NOTE — SUBJECTIVE & OBJECTIVE
Past Medical History:   Diagnosis Date    AC (acromioclavicular) joint bone spurs, unspecified laterality     Coronary artery disease     Depression     Hyperlipidemia     Hypertension     Osteoarthritis     ST elevation (STEMI) myocardial infarction of unspecified site     Vaginal delivery     x2       Past Surgical History:   Procedure Laterality Date    ADENOIDECTOMY      BREAST SURGERY      biospy    CORONARY STENT PLACEMENT N/A 3/27/2024    Procedure: INSERTION, STENT, CORONARY ARTERY;  Surgeon: Vitaliy Brown MD;  Location: [facility] CATH LAB;  Service: Cardiology;  Laterality: N/A;  PCI circumflex, right common femoral artery access    HYSTERECTOMY      IVUS, CORONARY  3/27/2024    Procedure: IVUS, Coronary;  Surgeon: Vitaliy Brown MD;  Location: [facility] CATH LAB;  Service: Cardiology;;    JOINT REPLACEMENT Bilateral     knee    LEFT HEART CATHETERIZATION Left 3/25/2024    Procedure: Left heart cath;  Surgeon: Vitaliy Brown MD;  Location: [facility] CATH LAB;  Service: Cardiology;  Laterality: Left;    stent-heart      TONSILLECTOMY      TOTAL KNEE ARTHROPLASTY         Review of patient's allergies indicates:   Allergen Reactions    Effexor [venlafaxine] Hives, Shortness Of Breath and Other (See Comments)     Dry mouth and chest pain    Bactrim [sulfamethoxazole-trimethoprim]     Codeine Itching and Other (See Comments)     Tightness in throat       No current facility-administered medications on file prior to encounter.     Current Outpatient Medications on File Prior to Encounter   Medication Sig    amLODIPine (NORVASC) 5 MG tablet Take 1 tablet (5 mg total) by mouth once daily.    aspirin (ECOTRIN) 81 MG EC tablet Take 81 mg by mouth once daily.      atorvastatin (LIPITOR) 80 MG tablet     biotin 1 mg Cap Take 1 capsule by mouth once daily.    clonazePAM (KLONOPIN) 1 MG tablet Take 1 tablet (1 mg total) by mouth 2 (two) times daily.    clopidogreL (PLAVIX) 75 mg tablet Take 1 tablet (75 mg total) by mouth once  daily.    colchicine (COLCRYS) 0.6 mg tablet Take 1 tablet (0.6 mg total) by mouth once daily.    docusate sodium (COLACE) 100 MG capsule Take 1 capsule (100 mg total) by mouth 2 (two) times daily. Use while taking Norco to prevent constipation    isosorbide mononitrate (IMDUR) 30 MG 24 hr tablet Take 1 tablet (30 mg total) by mouth once daily.    metoprolol succinate (TOPROL-XL) 50 MG 24 hr tablet Take 1 tablet (50 mg total) by mouth once daily.    multivitamin (THERAGRAN) per tablet Take 1 tablet by mouth once daily.    nitroGLYCERIN (NITROSTAT) 0.4 MG SL tablet Place 1 tablet (0.4 mg total) under the tongue every 5 (five) minutes as needed for Chest pain.     Family History       Problem Relation (Age of Onset)    Heart disease Mother, Father          Tobacco Use    Smoking status: Never    Smokeless tobacco: Never   Substance and Sexual Activity    Alcohol use: No    Drug use: No    Sexual activity: Yes     Partners: Male     Review of Systems   Constitutional: Negative.    HENT: Negative.     Eyes: Negative.    Respiratory: Negative.     Cardiovascular:  Positive for chest pain.   Gastrointestinal: Negative.    Endocrine: Negative.    Genitourinary: Negative.    Musculoskeletal: Negative.    Skin: Negative.    Allergic/Immunologic: Negative.    Neurological: Negative.    Psychiatric/Behavioral: Negative.       Objective:     Vital Signs (Most Recent):  Temp: 97.8 °F (36.6 °C) (04/03/24 2245)  Pulse: 64 (04/03/24 2245)  Resp: (!) 22 (04/03/24 2245)  BP: (!) 150/69 (04/03/24 2245)  SpO2: 100 % (04/03/24 2245) Vital Signs (24h Range):  Temp:  [97.8 °F (36.6 °C)-98.6 °F (37 °C)] 97.8 °F (36.6 °C)  Pulse:  [64-73] 64  Resp:  [18-26] 22  SpO2:  [100 %] 100 %  BP: (150-176)/(69-81) 150/69     Weight: 67.6 kg (149 lb)  Body mass index is 29.1 kg/m².     Physical Exam  Vitals and nursing note reviewed.   Constitutional:       General: She is not in acute distress.     Appearance: Normal appearance. She is not  ill-appearing.   HENT:      Head: Normocephalic and atraumatic.      Nose: Nose normal.      Mouth/Throat:      Mouth: Mucous membranes are moist.   Eyes:      Extraocular Movements: Extraocular movements intact.   Cardiovascular:      Rate and Rhythm: Normal rate.      Pulses: Normal pulses.      Heart sounds: No murmur heard.  Pulmonary:      Effort: Pulmonary effort is normal. No respiratory distress.   Abdominal:      General: Abdomen is flat.      Palpations: Abdomen is soft.      Tenderness: There is no abdominal tenderness.   Musculoskeletal:      Right lower leg: No edema.      Left lower leg: No edema.      Comments: Right radial TR band applied   Skin:     General: Skin is warm.      Capillary Refill: Capillary refill takes less than 2 seconds.   Neurological:      General: No focal deficit present.      Mental Status: She is alert.   Psychiatric:         Mood and Affect: Mood normal.                Significant Labs: All pertinent labs within the past 24 hours have been reviewed.    Significant Imaging: I have reviewed all pertinent imaging results/findings within the past 24 hours.

## 2024-04-04 NOTE — SUBJECTIVE & OBJECTIVE
Past Medical History:   Diagnosis Date    AC (acromioclavicular) joint bone spurs, unspecified laterality     Coronary artery disease     Depression     Hyperlipidemia     Hypertension     Osteoarthritis     ST elevation (STEMI) myocardial infarction of unspecified site     Vaginal delivery     x2       Past Surgical History:   Procedure Laterality Date    ADENOIDECTOMY      ANGIOGRAM, CORONARY, WITH LEFT HEART CATHETERIZATION N/A 4/3/2024    Procedure: Angiogram, Coronary, with Left Heart Cath;  Surgeon: Cl Rodriguez MD;  Location: [facility] CATH LAB;  Service: Cardiology;  Laterality: N/A;    BREAST SURGERY      biospy    CORONARY STENT PLACEMENT N/A 3/27/2024    Procedure: INSERTION, STENT, CORONARY ARTERY;  Surgeon: Vitaliy Brown MD;  Location: [facility] CATH LAB;  Service: Cardiology;  Laterality: N/A;  PCI circumflex, right common femoral artery access    HYSTERECTOMY      IVUS, CORONARY  3/27/2024    Procedure: IVUS, Coronary;  Surgeon: Vitaliy Brown MD;  Location: [facility] CATH LAB;  Service: Cardiology;;    IVUS, CORONARY  4/3/2024    Procedure: IVUS, Coronary;  Surgeon: Cl Rodriguez MD;  Location: [facility] CATH LAB;  Service: Cardiology;;    JOINT REPLACEMENT Bilateral     knee    LEFT HEART CATHETERIZATION Left 3/25/2024    Procedure: Left heart cath;  Surgeon: Vitaliy Brown MD;  Location: [facility] CATH LAB;  Service: Cardiology;  Laterality: Left;    PERCUTANEOUS TRANSLUMINAL BALLOON ANGIOPLASTY OF CORONARY ARTERY  4/3/2024    Procedure: Angioplasty-coronary;  Surgeon: Cl Rodriguez MD;  Location: [facility] CATH LAB;  Service: Cardiology;;    stent-heart      TONSILLECTOMY      TOTAL KNEE ARTHROPLASTY         Review of patient's allergies indicates:   Allergen Reactions    Effexor [venlafaxine] Hives, Shortness Of Breath and Other (See Comments)     Dry mouth and chest pain    Bactrim [sulfamethoxazole-trimethoprim]     Codeine Itching and Other (See Comments)     Tightness in throat       No  current facility-administered medications on file prior to encounter.     Current Outpatient Medications on File Prior to Encounter   Medication Sig    amLODIPine (NORVASC) 5 MG tablet Take 1 tablet (5 mg total) by mouth once daily.    aspirin (ECOTRIN) 81 MG EC tablet Take 81 mg by mouth once daily.      atorvastatin (LIPITOR) 80 MG tablet     biotin 1 mg Cap Take 1 capsule by mouth once daily.    clonazePAM (KLONOPIN) 1 MG tablet Take 1 tablet (1 mg total) by mouth 2 (two) times daily.    clopidogreL (PLAVIX) 75 mg tablet Take 1 tablet (75 mg total) by mouth once daily.    colchicine (COLCRYS) 0.6 mg tablet Take 1 tablet (0.6 mg total) by mouth once daily.    docusate sodium (COLACE) 100 MG capsule Take 1 capsule (100 mg total) by mouth 2 (two) times daily. Use while taking Norco to prevent constipation    isosorbide mononitrate (IMDUR) 30 MG 24 hr tablet Take 1 tablet (30 mg total) by mouth once daily.    metoprolol succinate (TOPROL-XL) 50 MG 24 hr tablet Take 1 tablet (50 mg total) by mouth once daily.    multivitamin (THERAGRAN) per tablet Take 1 tablet by mouth once daily.    nitroGLYCERIN (NITROSTAT) 0.4 MG SL tablet Place 1 tablet (0.4 mg total) under the tongue every 5 (five) minutes as needed for Chest pain.     Family History       Problem Relation (Age of Onset)    Heart disease Mother, Father          Tobacco Use    Smoking status: Never    Smokeless tobacco: Never   Substance and Sexual Activity    Alcohol use: No    Drug use: No    Sexual activity: Yes     Partners: Male     Review of Systems   Gastrointestinal:  Negative for melena.   Genitourinary:  Positive for hematuria.     Objective:     Vital Signs (Most Recent):  Temp: 98 °F (36.7 °C) (04/04/24 0715)  Pulse: 67 (04/04/24 1000)  Resp: (!) 35 (04/04/24 1000)  BP: (!) 159/65 (04/04/24 1000)  SpO2: 100 % (04/04/24 1000) Vital Signs (24h Range):  Temp:  [97.8 °F (36.6 °C)-98.6 °F (37 °C)] 98 °F (36.7 °C)  Pulse:  [58-73] 67  Resp:  [15-35]  35  SpO2:  [97 %-100 %] 100 %  BP: (128-176)/(56-81) 159/65     Weight: 67 kg (147 lb 11.3 oz)  Body mass index is 28.85 kg/m².    SpO2: 100 %         Intake/Output Summary (Last 24 hours) at 4/4/2024 1123  Last data filed at 4/4/2024 0549  Gross per 24 hour   Intake 2461.71 ml   Output 1200 ml   Net 1261.71 ml       Lines/Drains/Airways       Drain  Duration             Female External Urinary Catheter w/ Suction 04/03/24 2245 <1 day              Peripheral Intravenous Line  Duration                  Peripheral IV - Single Lumen 04/03/24 2018 22 G Right Antecubital <1 day         Peripheral IV - Single Lumen 04/03/24 2025 20 G Posterior;Proximal;Right Forearm <1 day                     Physical Exam  Constitutional:       General: She is not in acute distress.     Appearance: She is well-developed. She is obese. She is not ill-appearing, toxic-appearing or diaphoretic.   HENT:      Head: Normocephalic and atraumatic.   Eyes:      General: No scleral icterus.     Extraocular Movements: Extraocular movements intact.      Conjunctiva/sclera: Conjunctivae normal.      Pupils: Pupils are equal, round, and reactive to light.   Neck:      Vascular: No JVD.      Trachea: No tracheal deviation.   Cardiovascular:      Rate and Rhythm: Normal rate and regular rhythm.      Heart sounds: S1 normal and S2 normal. No murmur heard.     No friction rub. No gallop.      Comments: R rad access site c/d/i  Pulmonary:      Effort: Pulmonary effort is normal. No respiratory distress.      Breath sounds: Normal breath sounds. No stridor. No wheezing, rhonchi or rales.   Chest:      Chest wall: No tenderness.   Abdominal:      General: There is no distension.      Palpations: Abdomen is soft.   Musculoskeletal:         General: No swelling or tenderness. Normal range of motion.      Cervical back: Normal range of motion and neck supple. No rigidity.      Right lower leg: No edema.      Left lower leg: No edema.   Skin:     General: Skin  is warm and dry.      Coloration: Skin is not jaundiced.   Neurological:      General: No focal deficit present.      Mental Status: She is alert and oriented to person, place, and time.      Cranial Nerves: No cranial nerve deficit.   Psychiatric:         Mood and Affect: Mood normal.         Behavior: Behavior normal.          Current Medications:   amLODIPine  5 mg Oral Daily    aspirin  81 mg Oral Daily    atorvastatin  80 mg Oral QHS    clopidogreL  75 mg Oral Daily    enoxparin  40 mg Subcutaneous Q24H (prophylaxis, 1700)    isosorbide mononitrate  30 mg Oral Daily    metoprolol succinate  50 mg Oral Daily    mupirocin   Nasal BID    polyethylene glycol  17 g Oral Daily         acetaminophen, albuterol-ipratropium, aluminum-magnesium hydroxide-simethicone, atropine, bisacodyL, dextrose 10%, dextrose 10%, glucagon (human recombinant), glucose, glucose, magnesium oxide, magnesium oxide, melatonin, morphine, naloxone, nitroGLYCERIN, ondansetron, potassium bicarbonate, potassium bicarbonate, potassium bicarbonate, potassium, sodium phosphates, potassium, sodium phosphates, potassium, sodium phosphates, prochlorperazine, simethicone, sodium chloride 0.9%, sodium chloride 0.9%    Laboratory (all labs reviewed):  CBC:  Recent Labs   Lab 03/26/24  0440 03/26/24 1912 03/27/24  0215 04/03/24 2031 04/04/24  0423   WBC 9.73 9.98 9.99 8.93 7.49   Hemoglobin 14.5 13.3 13.6 11.3 L 11.4 L   Hematocrit 42.5 39.2 40.9 32.8 L 34.8 L   Platelets 253 264 235 366 307         CHEMISTRIES:  Recent Labs   Lab 03/25/24  1109 03/26/24  0440 03/27/24  0624 04/03/24 2031 04/04/24  0423   Glucose 110 105 117 H 98 91   Sodium 139 140 137 143 141   Potassium 3.2 L 3.9 3.9 3.0 L 3.4 L   BUN 9 14 19 13 8   Creatinine 0.9 0.9 0.7 0.8 0.6   eGFR >60 >60 >60 >60 >60   Calcium 10.3 9.7 9.2 9.3 8.0 L         CARDIAC BIOMARKERS:  Recent Labs   Lab 03/25/24  2257 03/26/24  0748 03/26/24 1912 04/03/24 2031 04/03/24  2250   Troponin I 0.136 H  0.242 H 0.229 H 0.161 H 1.996 H         COAGS:  Recent Labs   Lab 03/25/24  1124 03/26/24  1912 04/03/24 2031   INR 1.0 1.0 1.0         LIPIDS/LFTS:  Recent Labs   Lab 01/26/23  1413 03/25/24  1109 03/26/24  0440 04/03/24 2031   AST 24 24 14 15   ALT 25 16 14 10         BNP:  Recent Labs   Lab 03/25/24  1109 04/03/24 2031    H 43         TSH:        Free T4:        Diagnostic Results:  ECG (personally reviewed and interpreted tracing(s)):  4/3/24 EMS tracing SR with ?AHMET III, recip lat TWI/ST depression, similar to 3/25/24  4/3/24 2021 SR 70, AHMET III improved, ?hyperacute T waves    Chest X-Ray (personally reviewed and interpreted image(s)): 4/3/24 NAD    Echo: 3/25/24 (repeat ordered)    Left Ventricle: There is concentric remodeling. There is normal systolic function with a visually estimated ejection fraction of 55 - 60%. Grade I diastolic dysfunction.    Right Ventricle: Normal right ventricular cavity size. Systolic function is normal.    Left Atrium: Left atrium is mildly dilated.    Aortic Valve: There is mild aortic regurgitation.    Tricuspid Valve: There is mild regurgitation.    Pulmonary Artery: The estimated pulmonary artery systolic pressure is 24 mmHg.    IVC/SVC: Normal venous pressure at 3 mmHg.    Cath/PCI RCA AST 4/3/24 (images personally reviewed and interpreted)  LVEDP: 5mmHg  LVEF: echo ordered, prev normal LV fxn  Dominance: Right  LM: normal  LAD: mid diffuse 90% stenosis, small caliber distal vessel  LCx: patent prox-mid stents (placed 3/27/24)  RCA: prox-mid stents thrombotic occlusion (placed 3/25/24), faint antonio to RPDA from LAD.  PCI prox-mid RCA AST:  Preop ASA/Plavix, intra-op heparin  Predil 2.5x20 POBA  IVUS for assessment of stents.  ?underexpansion of distal stent edge.  POBA 2.75x30 NC 20 domingo  Post IVUS with excellent stent expansion and apposition, no dissections  Excellent angiographic result, T3 flow, 0% residual stenosis  Hemostasis:  R Radial  band  Impression:  NSTEMI  AST of RCA stents (placed 3/25/24) in setting of inadvertent ASA discontinuation  Successful IVUS guided PCI prox-mid RCA POBA  R rad vasband for hemostasis  Plan:  Admit to ICU  ASA 81mg qd indefinitely  Plavix 75mg qd for 1 year (thru Apr 2025), likely indef rx  Statin  Check echo  Outpat card rehab  Follow up with Dr. Brown after discharge.

## 2024-04-04 NOTE — HPI
This is an 78-year-old female with a past medical history of CAD/STEMI s/p PCI (3/25, 3/27), hypertension, hyperlipidemia, depression, anxiety, who presents with chest pain.      Patient had a recent hospitalization (3/25- 3/28) for STEMI and underwent multivessel PCI; on 3/25 s/p COLTON to pRCA and on 3/27 s/p COLTON to Prox Cx and mid Cx with residual 80% stenosis of mid to distal LAD. She was discharged with aspirin and plavix, but she was only taken her Plavix. Patient developed chest pain shortly prior to presentation. EMS administered aspirin 324 mg, and nitroglycerin SL.     In the ED, the patient was hypertensive.  Labs were remarkable for hypokalemia (3.0), elevated troponin (0.161).  Initial EKG showed ST elevations in inferior leads with reciprocal changes in anterolateral leads that resolved on subsequent EKGs. She was started on heparin drip and a code STEMI was activated; cardiology thought this was more consistent with high risk NSTEMI with concern for stent thrombosis. Patient was taken emergently to the cath lab which showed prox-mid RCA stent thrombotic occlusion (likely in the setting of inadvertent ASA discontinuation) s/p prox-mid RCA angioplasty. Patient was admitted for further management.

## 2024-04-04 NOTE — SUBJECTIVE & OBJECTIVE
Past Medical History:   Diagnosis Date    AC (acromioclavicular) joint bone spurs, unspecified laterality     Coronary artery disease     Depression     Hyperlipidemia     Hypertension     Osteoarthritis     Vaginal delivery     x2       Past Surgical History:   Procedure Laterality Date    ADENOIDECTOMY      BREAST SURGERY      biospy    CORONARY STENT PLACEMENT N/A 3/27/2024    Procedure: INSERTION, STENT, CORONARY ARTERY;  Surgeon: Vitaliy Brown MD;  Location: [facility] CATH LAB;  Service: Cardiology;  Laterality: N/A;  PCI circumflex, right common femoral artery access    HYSTERECTOMY      IVUS, CORONARY  3/27/2024    Procedure: IVUS, Coronary;  Surgeon: Vitaliy Brown MD;  Location: [facility] CATH LAB;  Service: Cardiology;;    JOINT REPLACEMENT Bilateral     knee    LEFT HEART CATHETERIZATION Left 3/25/2024    Procedure: Left heart cath;  Surgeon: Vitaliy Brown MD;  Location: [facility] CATH LAB;  Service: Cardiology;  Laterality: Left;    stent-heart      TONSILLECTOMY      TOTAL KNEE ARTHROPLASTY         Review of patient's allergies indicates:   Allergen Reactions    Effexor [venlafaxine] Hives, Shortness Of Breath and Other (See Comments)     Dry mouth and chest pain    Bactrim [sulfamethoxazole-trimethoprim]     Codeine Itching and Other (See Comments)     Tightness in throat       No current facility-administered medications on file prior to encounter.     Current Outpatient Medications on File Prior to Encounter   Medication Sig    amLODIPine (NORVASC) 5 MG tablet Take 1 tablet (5 mg total) by mouth once daily.    aspirin (ECOTRIN) 81 MG EC tablet Take 81 mg by mouth once daily.      atorvastatin (LIPITOR) 80 MG tablet     biotin 1 mg Cap Take 1 capsule by mouth once daily.    clonazePAM (KLONOPIN) 1 MG tablet Take 1 tablet (1 mg total) by mouth 2 (two) times daily.    clopidogreL (PLAVIX) 75 mg tablet Take 1 tablet (75 mg total) by mouth once daily.    colchicine (COLCRYS) 0.6 mg tablet Take 1 tablet (0.6 mg  total) by mouth once daily.    docusate sodium (COLACE) 100 MG capsule Take 1 capsule (100 mg total) by mouth 2 (two) times daily. Use while taking Norco to prevent constipation    isosorbide mononitrate (IMDUR) 30 MG 24 hr tablet Take 1 tablet (30 mg total) by mouth once daily.    metoprolol succinate (TOPROL-XL) 50 MG 24 hr tablet Take 1 tablet (50 mg total) by mouth once daily.    multivitamin (THERAGRAN) per tablet Take 1 tablet by mouth once daily.    nitroGLYCERIN (NITROSTAT) 0.4 MG SL tablet Place 1 tablet (0.4 mg total) under the tongue every 5 (five) minutes as needed for Chest pain.     Family History       Problem Relation (Age of Onset)    Heart disease Mother, Father          Tobacco Use    Smoking status: Never    Smokeless tobacco: Never   Substance and Sexual Activity    Alcohol use: No    Drug use: No    Sexual activity: Yes     Partners: Male     Review of Systems   Constitutional: Negative for chills, diaphoresis, fever and malaise/fatigue.   HENT:  Negative for nosebleeds.    Eyes:  Negative for blurred vision and double vision.   Cardiovascular:  Positive for chest pain. Negative for claudication, cyanosis, dyspnea on exertion, leg swelling, orthopnea, palpitations, paroxysmal nocturnal dyspnea and syncope.   Respiratory:  Negative for cough, shortness of breath and wheezing.    Skin:  Negative for dry skin and poor wound healing.   Musculoskeletal:  Negative for back pain, joint swelling and myalgias.   Gastrointestinal:  Negative for abdominal pain, nausea and vomiting.   Genitourinary:  Negative for hematuria.   Neurological:  Negative for dizziness, headaches, numbness, seizures and weakness.   Psychiatric/Behavioral:  Negative for altered mental status and depression.      Objective:     Vital Signs (Most Recent):  Temp: 98.6 °F (37 °C) (04/03/24 2025)  Pulse: 70 (04/03/24 2021)  Resp: 18 (04/03/24 2016)  BP: (!) 176/74 (04/03/24 2021)  SpO2: 100 % (04/03/24 2021) Vital Signs (24h  Range):  Temp:  [98.6 °F (37 °C)] 98.6 °F (37 °C)  Pulse:  [70-73] 70  Resp:  [18] 18  SpO2:  [100 %] 100 %  BP: (150-176)/(74-80) 176/74     Weight: 67.6 kg (149 lb)  Body mass index is 29.1 kg/m².    SpO2: 100 %       No intake or output data in the 24 hours ending 04/03/24 2042    Lines/Drains/Airways       Peripheral Intravenous Line  Duration                  Peripheral IV - Single Lumen 04/03/24 2018 22 G Right Antecubital <1 day         Peripheral IV - Single Lumen 04/03/24 2025 20 G Posterior;Proximal;Right Forearm <1 day                     Physical Exam  Constitutional:       General: She is not in acute distress.     Appearance: She is well-developed. She is not ill-appearing, toxic-appearing or diaphoretic.   HENT:      Head: Normocephalic and atraumatic.   Eyes:      General: No scleral icterus.     Extraocular Movements: Extraocular movements intact.      Conjunctiva/sclera: Conjunctivae normal.      Pupils: Pupils are equal, round, and reactive to light.   Neck:      Vascular: No JVD.      Trachea: No tracheal deviation.   Cardiovascular:      Rate and Rhythm: Normal rate and regular rhythm.      Pulses:           Radial pulses are 2+ on the right side.      Heart sounds: S1 normal and S2 normal. No murmur heard.     No friction rub. No gallop.   Pulmonary:      Effort: Pulmonary effort is normal. No respiratory distress.      Breath sounds: Normal breath sounds. No stridor. No wheezing, rhonchi or rales.   Chest:      Chest wall: No tenderness.   Abdominal:      General: There is no distension.      Palpations: Abdomen is soft.   Musculoskeletal:         General: No swelling or tenderness. Normal range of motion.      Cervical back: Normal range of motion and neck supple. No rigidity.      Right lower leg: No edema.      Left lower leg: No edema.   Skin:     General: Skin is warm and dry.      Coloration: Skin is not jaundiced.   Neurological:      General: No focal deficit present.      Mental  Status: She is alert and oriented to person, place, and time.      Cranial Nerves: No cranial nerve deficit.   Psychiatric:         Mood and Affect: Mood normal.         Behavior: Behavior normal.          Current Medications:   aspirin  325 mg Oral ED 1 Time      heparin (porcine) in D5W 12 Units/kg/hr (04/03/24 2035)     heparin (PORCINE), heparin (PORCINE)    Laboratory (all labs reviewed):  CBC:  Recent Labs   Lab 03/25/24  1109 03/26/24  0440 03/26/24 1912 03/27/24  0215 04/03/24 2031   WBC 9.50 9.73 9.98 9.99 8.93   Hemoglobin 16.0 14.5 13.3 13.6 11.3 L   Hematocrit 46.0 42.5 39.2 40.9 32.8 L   Platelets 306 253 264 235 366       CHEMISTRIES:  Recent Labs   Lab 04/06/23  1021 05/17/23  1258 03/25/24  1109 03/26/24  0440 03/27/24 0624   Glucose  --   --  110 105 117 H   Sodium 138 141 139 140 137   Potassium 3.7 3.9 3.2 L 3.9 3.9   BUN 16.7 20.1 H 9 14 19   Creatinine 0.76 0.70 0.9 0.9 0.7   eGFR  --   --  >60 >60 >60   Calcium 9.1 8.8 10.3 9.7 9.2       CARDIAC BIOMARKERS:  Recent Labs   Lab 03/25/24  1109 03/25/24  2257 03/26/24  0748 03/26/24 1912   Troponin I 0.128 H 0.136 H 0.242 H 0.229 H       COAGS:  Recent Labs   Lab 03/25/24  1124 03/26/24 1912   INR 1.0 1.0       LIPIDS/LFTS:  Recent Labs   Lab 01/26/23  1413 03/25/24  1109 03/26/24  0440   AST 24 24 14   ALT 25 16 14       BNP:  Recent Labs   Lab 03/25/24  1109    H       TSH:        Free T4:        Diagnostic Results:  ECG (personally reviewed and interpreted tracing(s)):  4/3/24 EMS tracing SR with ?AHMET III, recip lat TWI/ST depression, similar to 3/25/24  4/3/24 2021 SR 70, AHMET III improved, ?hyperacute T waves    Chest X-Ray (personally reviewed and interpreted image(s)): 4/3/24 NAD    Echo: 3/25/24 (repeat ordered)    Left Ventricle: There is concentric remodeling. There is normal systolic function with a visually estimated ejection fraction of 55 - 60%. Grade I diastolic dysfunction.    Right Ventricle: Normal right ventricular  cavity size. Systolic function is normal.    Left Atrium: Left atrium is mildly dilated.    Aortic Valve: There is mild aortic regurgitation.    Tricuspid Valve: There is mild regurgitation.    Pulmonary Artery: The estimated pulmonary artery systolic pressure is 24 mmHg.    IVC/SVC: Normal venous pressure at 3 mmHg.    Cath/PCI LCx: 3/27/24 (images personally reviewed and interpreted)    Successful IVUS guided PCI of prox to mid circ with COLTON x2 with excellent angiographic results.  IVUS post PCI revealed well-opposed and expanded stents.    The Prox Cx lesion was 95% stenosed with 0% stenosis post-intervention.    The Mid Cx lesion was 80% stenosed with 0% stenosis post-intervention.    Prox Cx lesion: A STENT SYNERGY XD [id number] stent was successfully placed at 12 ROCÍO for 15 sec.    Mid Cx lesion: A stent was successfully placed at 12 ROCÍO for 9 sec.    The estimated blood loss was <50 mL.  Left main:  No significant stenosis  Lad:  Mid to distal long 80% stenosis.  Considering the length of this lesion and the diffusely diseased LAD, consider medical management and PCI only if the patient continues to have lifestyle limiting angina despite maximum medical management   Circumflex:  Proximal 95% and mid 80% stenosis.  Successful PCI with COLTON x2   RCA: Previously placed RCA stents are widely patent.  Access:  Right common femoral artery access.  Remove sheath when ACT less than 160  Assessment and plan   Continue aspirin 81 mg daily indefinitely  Plavix 75 mg daily for at least 1 year and longer if no contraindication   Statins and aggressive risk factor modification    Cath/PCI RCA 3/25/24    There was three vessel coronary artery disease.    The Prox RCA lesion was 99% stenosed with 0% stenosis post-intervention.  This was the culprit vessel for the patient's myocardial infarction    Prox RCA lesion: A STENT SYNERGY XD [id number] stent was successfully placed at 16 ROCÍO for 15 sec.    The estimated blood loss was  <50 mL.  Left main:  No significant stenosis  Lad: Mid to distal small diffusely diseased vessel  Circumflex:  Proximal 95%, mid 80% stenosis   RCA:  Culprit vessel:  Proximal heavily calcified subtotal stenosis proximal to previously placed stent.  Successful PCI performed with restoration of DAVID 3 flow and excellent angiographic results.  Resolution of chest pain at end of procedure  Assessment plan   Continue aspirin Plavix statins aggressive risk-factor modification

## 2024-04-04 NOTE — PROGRESS NOTES
[facility] Medicine  Progress Note    Patient Name: Elizabeth Ayala  MRN: [id number]  Patient Class: IP- Inpatient   Admission Date: 4/3/2024  Length of Stay: 1 days  Attending Physician: Naomi Bar,*  Primary Care Provider: Amna Levy MD        Subjective:     Principal Problem:NSTEMI (non-ST elevated myocardial infarction)        HPI:  This is an 78-year-old female with a past medical history of CAD/STEMI s/p PCI (3/25, 3/27), hypertension, hyperlipidemia, depression, anxiety, who presents with chest pain.      Patient had a recent hospitalization (3/25- 3/28) for STEMI and underwent multivessel PCI; on 3/25 s/p COLTON to pRCA and on 3/27 s/p COLTON to Prox Cx and mid Cx with residual 80% stenosis of mid to distal LAD. She was discharged with aspirin and plavix, but she was only taken her Plavix. Patient developed chest pain shortly prior to presentation. EMS administered aspirin 324 mg, and nitroglycerin SL.     In the ED, the patient was hypertensive.  Labs were remarkable for hypokalemia (3.0), elevated troponin (0.161).  Initial EKG showed ST elevations in inferior leads with reciprocal changes in anterolateral leads that resolved on subsequent EKGs. She was started on heparin drip and a code STEMI was activated; cardiology thought this was more consistent with high risk NSTEMI with concern for stent thrombosis. Patient was taken emergently to the cath lab which showed prox-mid RCA stent thrombotic occlusion (likely in the setting of inadvertent ASA discontinuation) s/p prox-mid RCA angioplasty. Patient was admitted for further management.     Overview/Hospital Course:  Patient is a 78-year-old female with past medical history of CAD status post STEMI with multivessel PCI on 03/25 status post COLTON to pRCA and on 03/02/27 status post COLTON to proximal CX and mid CX with residual 80% stenosis of mid to distal LAD who was admitted for NSTEMI with dynamic ST elevations noted on  EKG.  She was initiated on heparin drip and code STEMI was activated.  Cardiology was consulted  Patient was emergently taken to cath lab on 03/24 revealed prox-mid RCA stent thrombotic occlusion (likely in the setting of inadvertent ASA discontinuation) s/p prox-mid RCA balloon angioplasty.  On aspirin 81 mg once daily, Plavix 75 once daily x 1 year and statin.  Needs outpatient cardiac rehab and follow up with Dr. Brown at discharge.  Echo pending    Interval History:  Patient reports 1 episode of bloody urine this morning.  No hematemesis/hematochezia/melena.  Denied dysuria/abdominal pain.  Right radial site with no evidence of active bleeding.  Previous right groin cath site with bruising in various stages of healing    Review of Systems   Constitutional: Negative.    Respiratory: Negative.     Cardiovascular: Negative.    Gastrointestinal: Negative.    Musculoskeletal: Negative.    Neurological: Negative.      Objective:     Vital Signs (Most Recent):  Temp: 98 °F (36.7 °C) (04/04/24 0715)  Pulse: 67 (04/04/24 1000)  Resp: (!) 35 (04/04/24 1000)  BP: (!) 159/65 (04/04/24 1000)  SpO2: 100 % (04/04/24 1000) Vital Signs (24h Range):  Temp:  [97.8 °F (36.6 °C)-98.6 °F (37 °C)] 98 °F (36.7 °C)  Pulse:  [58-73] 67  Resp:  [15-35] 35  SpO2:  [97 %-100 %] 100 %  BP: (128-176)/(56-81) 159/65     Weight: 67 kg (147 lb 11.3 oz)  Body mass index is 28.85 kg/m².    Intake/Output Summary (Last 24 hours) at 4/4/2024 1159  Last data filed at 4/4/2024 0549  Gross per 24 hour   Intake 2461.71 ml   Output 1200 ml   Net 1261.71 ml         Physical Exam  Constitutional:       General: She is not in acute distress.     Appearance: She is normal weight.   Cardiovascular:      Rate and Rhythm: Normal rate.      Pulses: Normal pulses.      Heart sounds: No murmur heard.  Pulmonary:      Effort: No respiratory distress.      Breath sounds: No wheezing.   Abdominal:      General: Bowel sounds are normal.      Palpations: Abdomen is soft.    Musculoskeletal:      Right lower leg: No edema.      Left lower leg: No edema.   Skin:     Findings: Bruising (At right groin various stages of healing) present.   Neurological:      General: No focal deficit present.      Mental Status: She is alert and oriented to person, place, and time. Mental status is at baseline.   Psychiatric:         Mood and Affect: Mood normal.             Significant Labs: All pertinent labs within the past 24 hours have been reviewed.    Significant Imaging: I have reviewed all pertinent imaging results/findings within the past 24 hours.    Assessment/Plan:      * NSTEMI (non-ST elevated myocardial infarction)  Patient presents with chest pain.   Initial EKG showed ST elevations in inferior leads with reciprocal changes in anterolateral leads that resolved on subsequent EKGs. She was started on heparin drip and a code STEMI was activated; cardiology thought this was more consistent with high risk NSTEMI with concern for stent thrombosis.   Patient was taken emergently to the cath lab which showed prox-mid RCA stent thrombotic occlusion (likely in the setting of inadvertent ASA discontinuation) s/p prox-mid RCA angioplasty.    Plan:   Aspirin 81 mg once daily indefinitely   Plavix 75 mg once daily for 1 year  Lipitor 80 od  Echo pending  Outpatient cardiac rehab and follow up with Dr. Brown at discharge    Hematuria    Patient has 1 episode of hematuria this morning noted by nursing staff.  Monitor for further episodes.  No previous urological issues.  Monitor  H/H    Mixed hyperlipidemia  Continue statin      Coronary artery disease involving native coronary artery of native heart with unstable angina pectoris  Patient with known CAD s/p stent placement, which is uncontrolled Will continue ASA, Plavix, and Statin and monitor for S/Sx of angina/ACS. Continue to monitor on telemetry.     Essential hypertension, benign  Chronic, controlled. Latest blood pressure and vitals reviewed-      Temp:  [97.8 °F (36.6 °C)-98.6 °F (37 °C)]   Pulse:  [58-73]   Resp:  [15-35]   BP: (128-176)/(56-81)   SpO2:  [97 %-100 %] .   Home meds for hypertension were reviewed and noted below.   Hypertension Medications               amLODIPine (NORVASC) 5 MG tablet Take 1 tablet (5 mg total) by mouth once daily.    isosorbide mononitrate (IMDUR) 30 MG 24 hr tablet Take 1 tablet (30 mg total) by mouth once daily.    metoprolol succinate (TOPROL-XL) 50 MG 24 hr tablet Take 1 tablet (50 mg total) by mouth once daily.    nitroGLYCERIN (NITROSTAT) 0.4 MG SL tablet Place 1 tablet (0.4 mg total) under the tongue every 5 (five) minutes as needed for Chest pain.            While in the hospital, will manage blood pressure as follows; Continue home antihypertensive regimen    Will utilize p.r.n. blood pressure medication only if patient's blood pressure greater than 180/110 and she develops symptoms such as worsening chest pain or shortness of breath.      VTE Risk Mitigation (From admission, onward)           Ordered     enoxaparin injection 40 mg  Every 24 hours         04/04/24 1013     IP VTE HIGH RISK PATIENT  Once         04/03/24 2252     Place sequential compression device  Until discontinued         04/03/24 2252                    Discharge Planning   LO:      Code Status: Full Code   Is the patient medically ready for discharge?:     Reason for patient still in hospital (select all that apply): Patient trending condition and Treatment               Critical care time spent on the evaluation and treatment of severe organ dysfunction, review of pertinent labs and imaging studies, discussions with consulting providers and discussions with patient/family: 45 minutes.      Naomi Bar MD  Department of Hospital Medicine   [facility] - Intensive Care

## 2024-04-04 NOTE — ASSESSMENT & PLAN NOTE
Chronic, controlled. Latest blood pressure and vitals reviewed-     Temp:  [98.6 °F (37 °C)]   Pulse:  [70-73]   Resp:  [18-26]   BP: (150-176)/(74-81)   SpO2:  [100 %] .   Home meds for hypertension were reviewed and noted below.   Hypertension Medications               amLODIPine (NORVASC) 5 MG tablet Take 1 tablet (5 mg total) by mouth once daily.    isosorbide mononitrate (IMDUR) 30 MG 24 hr tablet Take 1 tablet (30 mg total) by mouth once daily.    metoprolol succinate (TOPROL-XL) 50 MG 24 hr tablet Take 1 tablet (50 mg total) by mouth once daily.    nitroGLYCERIN (NITROSTAT) 0.4 MG SL tablet Place 1 tablet (0.4 mg total) under the tongue every 5 (five) minutes as needed for Chest pain.            While in the hospital, will manage blood pressure as follows; Continue home antihypertensive regimen    Will utilize p.r.n. blood pressure medication only if patient's blood pressure greater than 180/110 and she develops symptoms such as worsening chest pain or shortness of breath.

## 2024-04-04 NOTE — HOSPITAL COURSE
Interval Hx: pt seen in ICU, case d.w daughter and case mgmt at bedside.  No cp/sob.  Had some blood in urine that has since cleared (pt was instructed to follow up with urology if this recurs).    Tele:  70s (personally reviewed and interpreted)

## 2024-04-04 NOTE — PLAN OF CARE
PT a [facility] in ICU. Pt had episode of bloody urine in bedside commode notified Dr. Borges. Pt had no more episodes this shift. Pt had 1 BM and on RA. Tolerating diet with no issues. Voiding in purwick. No falls, injuries, or skin breakdown.     Problem: Adult Inpatient Plan of Care  Goal: Plan of Care Review  Outcome: Ongoing, Progressing  Goal: Patient-Specific Goal (Individualized)  Outcome: Ongoing, Progressing  Goal: Absence of Hospital-Acquired Illness or Injury  Outcome: Ongoing, Progressing  Goal: Optimal Comfort and Wellbeing  Outcome: Ongoing, Progressing  Goal: Readiness for Transition of Care  Outcome: Ongoing, Progressing

## 2024-04-04 NOTE — HOSPITAL COURSE
Patient is a 78-year-old female with past medical history of CAD status post STEMI with multivessel PCI on 03/25 status post COLTON to pRCA and on 03/02/27 status post COLTON to proximal CX and mid CX with residual 80% stenosis of mid to distal LAD who was admitted for NSTEMI with dynamic ST elevations noted on EKG.  She was initiated on heparin drip and code STEMI was activated.  Cardiology was consulted  Patient was emergently taken to cath lab on 03/24 revealed prox-mid RCA stent thrombotic occlusion (likely in the setting of inadvertent ASA discontinuation) s/p prox-mid RCA balloon angioplasty.  On aspirin 81 mg once daily, Plavix 75 once daily x 1 year and statin.  Needs outpatient cardiac rehab and follow up with Dr. Brown at discharge.  Echo with EF 55-60%, G1DD. She is stable for discharge to home. Continue medications as ordered at last discharge. Discussed with Dr Brown- continue colchicine for Dressler's syndrome even though chest pain is resolved. Follow up with Cardiology.

## 2024-04-04 NOTE — ASSESSMENT & PLAN NOTE
Chronic, controlled. Latest blood pressure and vitals reviewed-     Temp:  [97.8 °F (36.6 °C)-98.6 °F (37 °C)]   Pulse:  [58-73]   Resp:  [15-35]   BP: (128-176)/(56-81)   SpO2:  [97 %-100 %] .   Home meds for hypertension were reviewed and noted below.   Hypertension Medications               amLODIPine (NORVASC) 5 MG tablet Take 1 tablet (5 mg total) by mouth once daily.    isosorbide mononitrate (IMDUR) 30 MG 24 hr tablet Take 1 tablet (30 mg total) by mouth once daily.    metoprolol succinate (TOPROL-XL) 50 MG 24 hr tablet Take 1 tablet (50 mg total) by mouth once daily.    nitroGLYCERIN (NITROSTAT) 0.4 MG SL tablet Place 1 tablet (0.4 mg total) under the tongue every 5 (five) minutes as needed for Chest pain.            While in the hospital, will manage blood pressure as follows; Continue home antihypertensive regimen    Will utilize p.r.n. blood pressure medication only if patient's blood pressure greater than 180/110 and she develops symptoms such as worsening chest pain or shortness of breath.

## 2024-04-04 NOTE — ASSESSMENT & PLAN NOTE
Patient has 1 episode of hematuria this morning noted by nursing staff.  Monitor for further episodes.  No previous urological issues.  Monitor  H/H

## 2024-04-05 VITALS
HEIGHT: 60 IN | WEIGHT: 147.69 LBS | RESPIRATION RATE: 18 BRPM | SYSTOLIC BLOOD PRESSURE: 143 MMHG | BODY MASS INDEX: 28.99 KG/M2 | HEART RATE: 68 BPM | OXYGEN SATURATION: 100 % | TEMPERATURE: 98 F | DIASTOLIC BLOOD PRESSURE: 67 MMHG

## 2024-04-05 PROBLEM — N64.52 NIPPLE DISCHARGE: Status: RESOLVED | Noted: 2018-08-22 | Resolved: 2024-04-05

## 2024-04-05 PROBLEM — R31.9 HEMATURIA: Status: RESOLVED | Noted: 2024-04-04 | Resolved: 2024-04-05

## 2024-04-05 PROBLEM — D64.9 NORMOCYTIC ANEMIA: Status: ACTIVE | Noted: 2024-04-05

## 2024-04-05 LAB
ANION GAP SERPL CALC-SCNC: 4 MMOL/L (ref 8–16)
BASOPHILS # BLD AUTO: 0.02 K/UL (ref 0–0.2)
BASOPHILS NFR BLD: 0.3 % (ref 0–1.9)
BUN SERPL-MCNC: 8 MG/DL (ref 8–23)
CALCIUM SERPL-MCNC: 8.3 MG/DL (ref 8.7–10.5)
CHLORIDE SERPL-SCNC: 111 MMOL/L (ref 95–110)
CO2 SERPL-SCNC: 24 MMOL/L (ref 23–29)
CREAT SERPL-MCNC: 0.7 MG/DL (ref 0.5–1.4)
DIFFERENTIAL METHOD BLD: ABNORMAL
EOSINOPHIL # BLD AUTO: 0.3 K/UL (ref 0–0.5)
EOSINOPHIL NFR BLD: 4.3 % (ref 0–8)
ERYTHROCYTE [DISTWIDTH] IN BLOOD BY AUTOMATED COUNT: 14 % (ref 11.5–14.5)
EST. GFR  (NO RACE VARIABLE): >60 ML/MIN/1.73 M^2
GLUCOSE SERPL-MCNC: 93 MG/DL (ref 70–110)
HCT VFR BLD AUTO: 34 % (ref 37–48.5)
HGB BLD-MCNC: 11.2 G/DL (ref 12–16)
IMM GRANULOCYTES # BLD AUTO: 0.03 K/UL (ref 0–0.04)
IMM GRANULOCYTES NFR BLD AUTO: 0.4 % (ref 0–0.5)
LYMPHOCYTES # BLD AUTO: 1.8 K/UL (ref 1–4.8)
LYMPHOCYTES NFR BLD: 26.4 % (ref 18–48)
MCH RBC QN AUTO: 28.4 PG (ref 27–31)
MCHC RBC AUTO-ENTMCNC: 32.9 G/DL (ref 32–36)
MCV RBC AUTO: 86 FL (ref 82–98)
MONOCYTES # BLD AUTO: 0.6 K/UL (ref 0.3–1)
MONOCYTES NFR BLD: 8.8 % (ref 4–15)
NEUTROPHILS # BLD AUTO: 4.1 K/UL (ref 1.8–7.7)
NEUTROPHILS NFR BLD: 59.8 % (ref 38–73)
NRBC BLD-RTO: 0 /100 WBC
PLATELET # BLD AUTO: 290 K/UL (ref 150–450)
PMV BLD AUTO: 9 FL (ref 9.2–12.9)
POTASSIUM SERPL-SCNC: 3.7 MMOL/L (ref 3.5–5.1)
RBC # BLD AUTO: 3.95 M/UL (ref 4–5.4)
SODIUM SERPL-SCNC: 139 MMOL/L (ref 136–145)
WBC # BLD AUTO: 6.78 K/UL (ref 3.9–12.7)

## 2024-04-05 PROCEDURE — 36415 COLL VENOUS BLD VENIPUNCTURE: CPT | Performed by: STUDENT IN AN ORGANIZED HEALTH CARE EDUCATION/TRAINING PROGRAM

## 2024-04-05 PROCEDURE — 99291 CRITICAL CARE FIRST HOUR: CPT | Mod: ,,, | Performed by: INTERNAL MEDICINE

## 2024-04-05 PROCEDURE — 25000003 PHARM REV CODE 250: Performed by: STUDENT IN AN ORGANIZED HEALTH CARE EDUCATION/TRAINING PROGRAM

## 2024-04-05 PROCEDURE — 94761 N-INVAS EAR/PLS OXIMETRY MLT: CPT

## 2024-04-05 PROCEDURE — 99900035 HC TECH TIME PER 15 MIN (STAT)

## 2024-04-05 PROCEDURE — 25000003 PHARM REV CODE 250: Performed by: INTERNAL MEDICINE

## 2024-04-05 PROCEDURE — 80048 BASIC METABOLIC PNL TOTAL CA: CPT | Performed by: STUDENT IN AN ORGANIZED HEALTH CARE EDUCATION/TRAINING PROGRAM

## 2024-04-05 PROCEDURE — 85025 COMPLETE CBC W/AUTO DIFF WBC: CPT | Performed by: STUDENT IN AN ORGANIZED HEALTH CARE EDUCATION/TRAINING PROGRAM

## 2024-04-05 RX ORDER — TRAZODONE HYDROCHLORIDE 50 MG/1
50 TABLET ORAL NIGHTLY PRN
COMMUNITY
Start: 2023-11-15

## 2024-04-05 RX ADMIN — ASPIRIN 81 MG: 81 TABLET, COATED ORAL at 09:04

## 2024-04-05 RX ADMIN — CLOPIDOGREL BISULFATE 75 MG: 75 TABLET ORAL at 09:04

## 2024-04-05 RX ADMIN — ISOSORBIDE MONONITRATE 30 MG: 30 TABLET, EXTENDED RELEASE ORAL at 09:04

## 2024-04-05 RX ADMIN — POTASSIUM BICARBONATE 50 MEQ: 977.5 TABLET, EFFERVESCENT ORAL at 05:04

## 2024-04-05 RX ADMIN — METOPROLOL SUCCINATE 50 MG: 50 TABLET, EXTENDED RELEASE ORAL at 09:04

## 2024-04-05 RX ADMIN — AMLODIPINE BESYLATE 5 MG: 5 TABLET ORAL at 09:04

## 2024-04-05 RX ADMIN — MUPIROCIN: 20 OINTMENT TOPICAL at 09:04

## 2024-04-05 NOTE — NURSING
[facility] [place]  Nurses Note -- 4 Eyes      4/4/2024       Skin assessed on: Q Shift      [x] No Pressure Injuries Present    [x]Prevention Measures Documented    [] Yes LDA  for Pressure Injury Previously documented     [] Yes New Pressure Injury Discovered   [] LDA for New Pressure Injury Added      Attending RN:  Snow Alvarenga RN     Second RN:  Maribel Avitia RN

## 2024-04-05 NOTE — NURSING
[facility] [place] [facility]ensive [facility]  ICU Shift Summary  Date: 4/5/2024      Prehospitalization: Home  Admit Date / LOS : 4/3/2024/ 2 days    Diagnosis: NSTEMI (non-ST elevated myocardial infarction)    Consults:        Active: Cardio       Needed: N/A     Code Status: Full Code   Advanced Directive: <no information>    LDA:  Lines/Drains/Airways       Drain  Duration             Female External Urinary Catheter w/ Suction 04/03/24 2245 1 day              Peripheral Intravenous Line  Duration                  Peripheral IV - Single Lumen 04/03/24 2018 22 G Right Antecubital 1 day         Peripheral IV - Single Lumen 04/03/24 2025 20 G Posterior;Proximal;Right Forearm 1 day                  Central Lines/Site/Justification:Patient Does Not Have Central Line  Urinary Cath/Order/Justification:Patient Does Not Have Urinary Catheter    Vasopressors/Infusions:        GOALS: Volume/ Hemodynamic: N/A                     RASS: 0  alert and calm    Pain Management: none       Pain Controlled: yes     Rhythm: NSR    Respiratory Device: Room Air                      Most Recent SBT/ SAT: Pass       MOVE Screen: PASS  ICU Liberation: yes    VTE Prophylaxis: Ambulation  Mobility: Ambulatory  Stress Ulcer Prophylaxis: Yes    Isolation: No active isolations    Dietary:   Current Diet Order   Procedures    Diet Cardiac Standard Tray     Order Specific Question:   Tray type:     Answer:   Standard Tray      Tolerance: yes  Advancement: yes    I & O (24h):    Intake/Output Summary (Last 24 hours) at 4/5/2024 0542  Last data filed at 4/5/2024 0527  Gross per 24 hour   Intake 2951.71 ml   Output 2700 ml   Net 251.71 ml        Restraints: No    Significant Dates:  Post Op Date: N/A  Rescue Date: N/A  Imaging/ Diagnostics: N/A    Noteworthy Labs:  none    COVID Test: (--)  CBC/Anemia Labs: Coags:    Recent Labs   Lab 04/04/24  0423 04/05/24  0419   WBC 7.49 6.78   HGB 11.4* 11.2*   HCT 34.8* 34.0*    290   MCV 85 86   RDW 13.6 14.0    Recent  "Labs   Lab 04/03/24 2031   INR 1.0   APTT 27.0        Chemistries:   Recent Labs   Lab 04/03/24 2031 04/04/24  0423 04/05/24  0419    141 139   K 3.0* 3.4* 3.7   * 110 111*   CO2 18* 21* 24   BUN 13 8 8   CREATININE 0.8 0.6 0.7   CALCIUM 9.3 8.0* 8.3*   PROT 6.2  --   --    BILITOT 0.7  --   --    ALKPHOS 105  --   --    ALT 10  --   --    AST 15  --   --         Cardiac Enzymes: Ejection Fractions:    Recent Labs     04/03/24 2031 04/03/24  2250   TROPONINI 0.161* 1.996*    No results found for: "EF"     POCT Glucose: HbA1c:    No results for input(s): "POCTGLUCOSE" in the last 168 hours. Hemoglobin A1C   Date Value Ref Range Status   12/11/2007 5.5 4.5 - 6.2 % Final     Hemoglobin A1c   Date Value Ref Range Status   03/25/2021 4.9 <5.7 % of total Hgb Final     Comment:     For the purpose of screening for the presence of  diabetes:    <5.7%       Consistent with the absence of diabetes  5.7-6.4%    Consistent with increased risk for diabetes              (prediabetes)  > or =6.5%  Consistent with diabetes    This assay result is consistent with a decreased risk  of diabetes.    Currently, no consensus exists regarding use of  hemoglobin A1c for diagnosis of diabetes in children.    According to American Diabetes Association (ADA)  guidelines, hemoglobin A1c <7.0% represents optimal  control in non-pregnant diabetic patients. Different  metrics may apply to specific patient populations.   Standards of Medical Care in Diabetes(ADA).               ICU LOS 1d 7h  Level of Care: OK to Transfer    Chart Check: 24 HR Done  Shift Summary/Plan for the shift: 7 beat run of vtach during the night. Converted per self without distress noted. K replacement given.No acute distress noted, safety maintained.   "

## 2024-04-05 NOTE — NURSING
Discharge instructions given. Patient verbalized understanding. No distress noted. IV removed. Patient discharged home with family and personal transportation.

## 2024-04-05 NOTE — DISCHARGE SUMMARY
[facility] Medicine  Discharge Summary      Patient Name: Elizabeth Ayala  MRN: [id number]  SADI: [id number]  Patient Class: IP- Inpatient  Admission Date: 4/3/2024  Hospital Length of Stay: 2 days  Discharge Date and Time:  04/05/2024 9:16 AM  Attending Physician: Rajni Fong MD   Discharging Provider: Rajni Fong MD  Primary Care Provider: Amna Levy MD    Primary Care Team: Networked reference to record PCT     HPI:   This is an 78-year-old female with a past medical history of CAD/STEMI s/p PCI (3/25, 3/27), hypertension, hyperlipidemia, depression, anxiety, who presents with chest pain.      Patient had a recent hospitalization (3/25- 3/28) for STEMI and underwent multivessel PCI; on 3/25 s/p COLTON to pRCA and on 3/27 s/p COLTON to Prox Cx and mid Cx with residual 80% stenosis of mid to distal LAD. She was discharged with aspirin and plavix, but she was only taken her Plavix. Patient developed chest pain shortly prior to presentation. EMS administered aspirin 324 mg, and nitroglycerin SL.     In the ED, the patient was hypertensive.  Labs were remarkable for hypokalemia (3.0), elevated troponin (0.161).  Initial EKG showed ST elevations in inferior leads with reciprocal changes in anterolateral leads that resolved on subsequent EKGs. She was started on heparin drip and a code STEMI was activated; cardiology thought this was more consistent with high risk NSTEMI with concern for stent thrombosis. Patient was taken emergently to the cath lab which showed prox-mid RCA stent thrombotic occlusion (likely in the setting of inadvertent ASA discontinuation) s/p prox-mid RCA angioplasty. Patient was admitted for further management.     Procedure(s) (LRB):  Angiogram, Coronary, with Left Heart Cath (N/A)  IVUS, Coronary  Angioplasty-coronary      Hospital Course:   Patient is a 78-year-old female with past medical history of CAD status post STEMI with multivessel PCI on 03/25 status  post COLTON to pRCA and on 03/02/27 status post COLTON to proximal CX and mid CX with residual 80% stenosis of mid to distal LAD who was admitted for NSTEMI with dynamic ST elevations noted on EKG.  She was initiated on heparin drip and code STEMI was activated.  Cardiology was consulted  Patient was emergently taken to cath lab on 03/24 revealed prox-mid RCA stent thrombotic occlusion (likely in the setting of inadvertent ASA discontinuation) s/p prox-mid RCA balloon angioplasty.  On aspirin 81 mg once daily, Plavix 75 once daily x 1 year and statin.  Needs outpatient cardiac rehab and follow up with Dr. Brown at discharge.  Echo with EF 55-60%, G1DD. She is stable for discharge to home. Continue medications as ordered at last discharge. Discussed with Dr Brown- continue colchicine for Dressler's syndrome even though chest pain is resolved. Follow up with Cardiology.      Goals of Care Treatment Preferences:  Code Status: Full Code      Consults:   Consults (From admission, onward)          Status Ordering Provider     Inpatient consult to Cardiology  Once        Provider:  Cl Rodriguez MD    Completed YAZMIN ANAND            No new Assessment & Plan notes have been filed under this hospital service since the last note was generated.  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] 03/26/2024 Yes    Mixed hyperlipidemia [E78.2] 04/30/2015 Yes    Coronary artery disease involving native coronary artery of native heart with unstable angina pectoris [I25.110] 11/05/2013 Yes    Essential hypertension, benign [I10] 10/05/2012 Yes      Problems Resolved During this Admission:    Diagnosis Date Noted Date Resolved POA    Hematuria [R31.9] 04/04/2024 04/05/2024 Yes       Discharged Condition: good    Disposition: Home  or Self Care    Follow Up: with Cardiology     Patient Instructions:      REASON FOR NOT PRESCRIBING ANTIPLATELET MEDICATION AT DISCHARGE     Order Specific Question Answer Comments   Reason for not Prescribing: Other (Comment) continuing home medication       Significant Diagnostic Studies: N/A    Pending Diagnostic Studies:       None           Medications:  Reconciled Home Medications:      Medication List        START taking these medications      ticagrelor 90 mg tablet  Commonly known as: BRILINTA  Take 1 tablet (90 mg total) by mouth 2 (two) times daily.  Start taking on: April 6, 2024            CONTINUE taking these medications      amLODIPine 5 MG tablet  Commonly known as: NORVASC  Take 1 tablet (5 mg total) by mouth once daily.     aspirin 81 MG EC tablet  Commonly known as: ECOTRIN  Take 81 mg by mouth once daily.     atorvastatin 80 MG tablet  Commonly known as: LIPITOR     clonazePAM 1 MG tablet  Commonly known as: KlonoPIN  Take 1 tablet (1 mg total) by mouth 2 (two) times daily.     colchicine 0.6 mg tablet  Commonly known as: COLCRYS  Take 1 tablet (0.6 mg total) by mouth once daily.     isosorbide mononitrate 30 MG 24 hr tablet  Commonly known as: IMDUR  Take 1 tablet (30 mg total) by mouth once daily.     metoprolol succinate 50 MG 24 hr tablet  Commonly known as: TOPROL-XL  Take 1 tablet (50 mg total) by mouth once daily.     multivitamin per tablet  Commonly known as: THERAGRAN  Take 1 tablet by mouth once daily.     nitroGLYCERIN 0.4 MG SL tablet  Commonly known as: NITROSTAT  Place 1 tablet (0.4 mg total) under the tongue every 5 (five) minutes as needed for Chest pain.     traZODone 50 MG tablet  Commonly known as: DESYREL  Take 50 mg by mouth nightly as needed for Insomnia (1-2 tabs at night as needed).            STOP taking these medications      biotin 1 mg Cap     docusate sodium 100 MG capsule  Commonly known as: COLACE              Indwelling Lines/Drains at time of discharge:    none      Time spent on the discharge of patient: 35 minutes      Rajni Fong MD  Department of Hospital Medicine  [facility] - [place] - Intensive Care

## 2024-04-05 NOTE — DISCHARGE INSTRUCTIONS
STOP taking plavix (clopidogrel)    On 4/6/24, take 2 tabs of ticagrelor 90mg (180mg total).  On 4/7/24, start taking 1 tab ticagrelor 90mg twice daily

## 2024-04-05 NOTE — NURSING
7 beat run of Vtach. Converted per self. Strip posted. Patient asleep during event. No sign of distress noted at present.

## 2024-04-05 NOTE — PLAN OF CARE
Case Management Final Discharge Note      Discharge Disposition: Home       New DME ordered / company name: NA    Relevant SDOH / Transition of Care Barriers:  NA    Primary Caretaker and contact information:     Deanne Larsen (Daughter)  [phone] (Mobile)       Scheduled followup appointment: Jojo    Referrals placed: RUTHIE    Transportation: Family        Patient and family educated on discharge services and updated on DC plan. Bedside RN notified, patient clear to discharge from Case Management Perspective.      04/05/24 0913   Final Note   Assessment Type Final Discharge Note   Anticipated Discharge Disposition Home   Hospital Resources/Appts/Education Provided Appointments scheduled and added to AVS   Post-Acute Status   Post-Acute Authorization Other   Other Status No Post-Acute Service Needs   Discharge Delays None known at this time

## 2024-04-06 ENCOUNTER — NURSE TRIAGE (OUTPATIENT)
Dept: ADMINISTRATIVE | Facility: CLINIC | Age: 79
End: 2024-04-06
Payer: MEDICARE

## 2024-04-06 NOTE — PROGRESS NOTES
[facility] Intensive Care  Cardiology  Progress Note    Patient Name: Elizabeth Ayala  MRN: [id number]  Admission Date: 4/3/2024  Hospital Length of Stay: 2 days  Code Status: Prior   Attending Physician: No att. providers found   Primary Care Physician: Amna Levy MD  Expected Discharge Date: 4/5/2024  Principal Problem:NSTEMI (non-ST elevated myocardial infarction)    Subjective:         Interval History:  Chest pain-free.  Doing fine.    Past Medical History:   Diagnosis Date    AC (acromioclavicular) joint bone spurs, unspecified laterality     Coronary artery disease     Depression     Hyperlipidemia     Hypertension     Osteoarthritis     ST elevation (STEMI) myocardial infarction of unspecified site     Vaginal delivery     x2       Past Surgical History:   Procedure Laterality Date    ADENOIDECTOMY      ANGIOGRAM, CORONARY, WITH LEFT HEART CATHETERIZATION N/A 4/3/2024    Procedure: Angiogram, Coronary, with Left Heart Cath;  Surgeon: Cl Rodriguez MD;  Location: [facility] CATH LAB;  Service: Cardiology;  Laterality: N/A;    BREAST SURGERY      biospy    CORONARY STENT PLACEMENT N/A 3/27/2024    Procedure: INSERTION, STENT, CORONARY ARTERY;  Surgeon: Vitlaiy Brown MD;  Location: [facility] CATH LAB;  Service: Cardiology;  Laterality: N/A;  PCI circumflex, right common femoral artery access    HYSTERECTOMY      IVUS, CORONARY  3/27/2024    Procedure: IVUS, Coronary;  Surgeon: Vitaliy Brown MD;  Location: [facility] CATH LAB;  Service: Cardiology;;    IVUS, CORONARY  4/3/2024    Procedure: IVUS, Coronary;  Surgeon: Cl Rodriguez MD;  Location: [facility] CATH LAB;  Service: Cardiology;;    JOINT REPLACEMENT Bilateral     knee    LEFT HEART CATHETERIZATION Left 3/25/2024    Procedure: Left heart cath;  Surgeon: Vitaliy Brown MD;  Location: [facility] CATH LAB;  Service: Cardiology;  Laterality: Left;    PERCUTANEOUS TRANSLUMINAL BALLOON ANGIOPLASTY OF CORONARY ARTERY  4/3/2024    Procedure: Angioplasty-coronary;   Surgeon: Cl Rodriguez MD;  Location: [facility] CATH LAB;  Service: Cardiology;;    stent-heart      TONSILLECTOMY      TOTAL KNEE ARTHROPLASTY         Review of patient's allergies indicates:   Allergen Reactions    Effexor [venlafaxine] Hives, Shortness Of Breath and Other (See Comments)     Dry mouth and chest pain    Bactrim [sulfamethoxazole-trimethoprim]     Codeine Itching and Other (See Comments)     Tightness in throat       No current facility-administered medications on file prior to encounter.     Current Outpatient Medications on File Prior to Encounter   Medication Sig    traZODone (DESYREL) 50 MG tablet Take 50 mg by mouth nightly as needed for Insomnia (1-2 tabs at night as needed).    amLODIPine (NORVASC) 5 MG tablet Take 1 tablet (5 mg total) by mouth once daily.    aspirin (ECOTRIN) 81 MG EC tablet Take 81 mg by mouth once daily.      atorvastatin (LIPITOR) 80 MG tablet     clonazePAM (KLONOPIN) 1 MG tablet Take 1 tablet (1 mg total) by mouth 2 (two) times daily.    colchicine (COLCRYS) 0.6 mg tablet Take 1 tablet (0.6 mg total) by mouth once daily.    isosorbide mononitrate (IMDUR) 30 MG 24 hr tablet Take 1 tablet (30 mg total) by mouth once daily.    metoprolol succinate (TOPROL-XL) 50 MG 24 hr tablet Take 1 tablet (50 mg total) by mouth once daily.    multivitamin (THERAGRAN) per tablet Take 1 tablet by mouth once daily.    nitroGLYCERIN (NITROSTAT) 0.4 MG SL tablet Place 1 tablet (0.4 mg total) under the tongue every 5 (five) minutes as needed for Chest pain.    [DISCONTINUED] biotin 1 mg Cap Take 1 capsule by mouth once daily.    [DISCONTINUED] clopidogreL (PLAVIX) 75 mg tablet Take 1 tablet (75 mg total) by mouth once daily.    [DISCONTINUED] docusate sodium (COLACE) 100 MG capsule Take 1 capsule (100 mg total) by mouth 2 (two) times daily. Use while taking Norco to prevent constipation     Family History       Problem Relation (Age of Onset)    Heart disease Mother, Father           Tobacco Use    Smoking status: Never    Smokeless tobacco: Never   Substance and Sexual Activity    Alcohol use: No    Drug use: No    Sexual activity: Yes     Partners: Male     Review of Systems   Cardiovascular:  Negative for chest pain.   Respiratory:  Negative for sputum production.    Gastrointestinal:  Negative for melena.     Objective:     Vital Signs (Most Recent):  Temp: 97.9 °F (36.6 °C) (04/05/24 0755)  Pulse: 68 (04/05/24 0755)  Resp: 18 (04/05/24 0738)  BP: (!) 143/67 (04/05/24 0755)  SpO2: 100 % (04/05/24 0738) Vital Signs (24h Range):  Temp:  [97.8 °F (36.6 °C)-97.9 °F (36.6 °C)] 97.9 °F (36.6 °C)  Pulse:  [59-68] 68  Resp:  [17-26] 18  SpO2:  [94 %-100 %] 100 %  BP: (129-162)/(61-67) 143/67     Weight: 67 kg (147 lb 11.3 oz)  Body mass index is 28.85 kg/m².    SpO2: 100 %         Intake/Output Summary (Last 24 hours) at 4/5/2024 2042  Last data filed at 4/5/2024 0527  Gross per 24 hour   Intake 240 ml   Output 650 ml   Net -410 ml         Lines/Drains/Airways       None                    Physical Exam  Constitutional:       General: She is not in acute distress.     Appearance: She is well-developed. She is obese. She is not ill-appearing, toxic-appearing or diaphoretic.   HENT:      Head: Normocephalic and atraumatic.   Eyes:      General: No scleral icterus.     Extraocular Movements: Extraocular movements intact.      Conjunctiva/sclera: Conjunctivae normal.      Pupils: Pupils are equal, round, and reactive to light.   Neck:      Vascular: No JVD.      Trachea: No tracheal deviation.   Cardiovascular:      Rate and Rhythm: Normal rate and regular rhythm.      Heart sounds: S1 normal and S2 normal. No murmur heard.     No friction rub. No gallop.      Comments: R rad access site c/d/i  Pulmonary:      Effort: Pulmonary effort is normal. No respiratory distress.      Breath sounds: Normal breath sounds. No stridor. No wheezing, rhonchi or rales.   Chest:      Chest wall: No tenderness.    Abdominal:      General: There is no distension.      Palpations: Abdomen is soft.   Musculoskeletal:         General: No swelling or tenderness. Normal range of motion.      Cervical back: Normal range of motion and neck supple. No rigidity.      Right lower leg: No edema.      Left lower leg: No edema.   Skin:     General: Skin is warm and dry.      Coloration: Skin is not jaundiced.   Neurological:      General: No focal deficit present.      Mental Status: She is alert and oriented to person, place, and time.      Cranial Nerves: No cranial nerve deficit.   Psychiatric:         Mood and Affect: Mood normal.         Behavior: Behavior normal.          Current Medications:              Laboratory (all labs reviewed):  CBC:  Recent Labs   Lab 03/26/24 1912 03/27/24  0215 04/03/24 2031 04/04/24 0423 04/05/24  0419   WBC 9.98 9.99 8.93 7.49 6.78   Hemoglobin 13.3 13.6 11.3 L 11.4 L 11.2 L   Hematocrit 39.2 40.9 32.8 L 34.8 L 34.0 L   Platelets 264 235 366 307 290         CHEMISTRIES:  Recent Labs   Lab 03/26/24  0440 03/27/24  0624 04/03/24 2031 04/04/24 0423 04/05/24  0419   Glucose 105 117 H 98 91 93   Sodium 140 137 143 141 139   Potassium 3.9 3.9 3.0 L 3.4 L 3.7   BUN 14 19 13 8 8   Creatinine 0.9 0.7 0.8 0.6 0.7   eGFR >60 >60 >60 >60 >60   Calcium 9.7 9.2 9.3 8.0 L 8.3 L         CARDIAC BIOMARKERS:  Recent Labs   Lab 03/25/24  2257 03/26/24  0748 03/26/24 1912 04/03/24 2031 04/03/24 2250   Troponin I 0.136 H 0.242 H 0.229 H 0.161 H 1.996 H         COAGS:  Recent Labs   Lab 03/25/24  1124 03/26/24 1912 04/03/24 2031   INR 1.0 1.0 1.0         LIPIDS/LFTS:  Recent Labs   Lab 01/26/23  1413 03/25/24  1109 03/26/24  0440 04/03/24 2031   AST 24 24 14 15   ALT 25 16 14 10         BNP:  Recent Labs   Lab 03/25/24  1109 04/03/24  2031    H 43         TSH:        Free T4:        Diagnostic Results:  ECG (personally reviewed and interpreted tracing(s)):  4/3/24 EMS tracing SR with ?AHMET III, recip lat  TWI/ST depression, similar to 3/25/24  4/3/24 2021 SR 70, AHMET III improved, ?hyperacute T waves    Chest X-Ray (personally reviewed and interpreted image(s)): 4/3/24 NAD    Echo: 3/25/24 (repeat ordered)    Left Ventricle: There is concentric remodeling. There is normal systolic function with a visually estimated ejection fraction of 55 - 60%. Grade I diastolic dysfunction.    Right Ventricle: Normal right ventricular cavity size. Systolic function is normal.    Left Atrium: Left atrium is mildly dilated.    Aortic Valve: There is mild aortic regurgitation.    Tricuspid Valve: There is mild regurgitation.    Pulmonary Artery: The estimated pulmonary artery systolic pressure is 24 mmHg.    IVC/SVC: Normal venous pressure at 3 mmHg.    Cath/PCI RCA AST 4/3/24 (images personally reviewed and interpreted)  LVEDP: 5mmHg  LVEF: echo ordered, prev normal LV fxn  Dominance: Right  LM: normal  LAD: mid diffuse 90% stenosis, small caliber distal vessel  LCx: patent prox-mid stents (placed 3/27/24)  RCA: prox-mid stents thrombotic occlusion (placed 3/25/24), faint antonio to RPDA from LAD.  PCI prox-mid RCA AST:  Preop ASA/Plavix, intra-op heparin  Predil 2.5x20 POBA  IVUS for assessment of stents.  ?underexpansion of distal stent edge.  POBA 2.75x30 NC 20 domingo  Post IVUS with excellent stent expansion and apposition, no dissections  Excellent angiographic result, T3 flow, 0% residual stenosis  Hemostasis:  R Radial band  Impression:  NSTEMI  AST of RCA stents (placed 3/25/24) in setting of inadvertent ASA discontinuation  Successful IVUS guided PCI prox-mid RCA POBA  R rad vasband for hemostasis  Plan:  Admit to ICU  ASA 81mg qd indefinitely  Plavix 75mg qd for 1 year (thru Apr 2025), likely indef rx  Statin  Check echo  Outpat card rehab  Follow up with Dr. Brown after discharge.      Assessment and Plan:     Brief HPI:     * NSTEMI (non-ST elevated myocardial infarction)  Concerning symptoms which replicated her presentation on  03/25/2024 for STEMI.    EKG with dynamic ST elevation, now abated with resolution of chest pain.  MV PCI (RCA and LCx) with unrevascularized LAD disease.  Patient tells me she has not been taking aspirin.  She has been taking clopidogrel.  Cath 4/3/24: AST of RCA stents (placed 3/25/24) in setting of inadvertent ASA discontinuation  Successful IVUS guided PCI prox-mid RCA POBA  Plan:  ASA 81mg qd indefinitely  Full switch from Plavix to Brilinta.  Load with Brilinta at time of switch after that 90 mg b.i.d.  Statin  Check echo  Outpat card rehab  Follow up with Dr. Brown after discharge.    Mixed hyperlipidemia  Cont statin      Hypertension  Cont med rx    Coronary artery disease involving native coronary artery of native heart with unstable angina pectoris  As above    Essential hypertension, benign  Cont med rx        VTE Risk Mitigation (From admission, onward)           Ordered     IP VTE HIGH RISK PATIENT  Once         04/03/24 2154                    Vitaliy Brown MD  Cardiology  [facility] - [place] - Intensive Care      Critical Care Time:  35 minutes     Critical care was time spent personally by me on the following activities: development of treatment plan with patient or surrogate and bedside caregivers, discussions with consultants, evaluation of patient's response to treatment, examination of patient, ordering and performing treatments and interventions, ordering and review of laboratory studies, ordering and review of radiographic studies, pulse oximetry, re-evaluation of patient's condition. This critical care time did not overlap with that of any other provider or involve time for any procedures.

## 2024-04-06 NOTE — TELEPHONE ENCOUNTER
Daughter Deanne calling on behalf of pt. Pt placed on phone. States that she was just released with NSTEMI with 3 stents placed. Pt now on phone and severely SOB; speaking in single words at a time. Advised to call 911 now per protocol. Encounter routed to provider.     Reason for Disposition   SEVERE difficulty breathing (e.g., struggling for each breath, speaks in single words)    Protocols used: Breathing Difficulty-A-AH

## 2024-04-06 NOTE — ASSESSMENT & PLAN NOTE
Concerning symptoms which replicated her presentation on 03/25/2024 for STEMI.    EKG with dynamic ST elevation, now abated with resolution of chest pain.  MV PCI (RCA and LCx) with unrevascularized LAD disease.  Patient tells me she has not been taking aspirin.  She has been taking clopidogrel.  Cath 4/3/24: AST of RCA stents (placed 3/25/24) in setting of inadvertent ASA discontinuation  Successful IVUS guided PCI prox-mid RCA POBA  Plan:  ASA 81mg qd indefinitely  Full switch from Plavix to Brilinta.  Load with Brilinta at time of switch after that 90 mg b.i.d.  Statin  Check echo  Outpat card rehab  Follow up with Dr. Brown after discharge.

## 2024-04-06 NOTE — SUBJECTIVE & OBJECTIVE
Interval History:  Chest pain-free.  Doing fine.    Past Medical History:   Diagnosis Date    AC (acromioclavicular) joint bone spurs, unspecified laterality     Coronary artery disease     Depression     Hyperlipidemia     Hypertension     Osteoarthritis     ST elevation (STEMI) myocardial infarction of unspecified site     Vaginal delivery     x2       Past Surgical History:   Procedure Laterality Date    ADENOIDECTOMY      ANGIOGRAM, CORONARY, WITH LEFT HEART CATHETERIZATION N/A 4/3/2024    Procedure: Angiogram, Coronary, with Left Heart Cath;  Surgeon: Cl Rodriguez MD;  Location: [facility] CATH LAB;  Service: Cardiology;  Laterality: N/A;    BREAST SURGERY      biospy    CORONARY STENT PLACEMENT N/A 3/27/2024    Procedure: INSERTION, STENT, CORONARY ARTERY;  Surgeon: Vitaliy Brown MD;  Location: [facility] CATH LAB;  Service: Cardiology;  Laterality: N/A;  PCI circumflex, right common femoral artery access    HYSTERECTOMY      IVUS, CORONARY  3/27/2024    Procedure: IVUS, Coronary;  Surgeon: Vitaliy Brown MD;  Location: [facility] CATH LAB;  Service: Cardiology;;    IVUS, CORONARY  4/3/2024    Procedure: IVUS, Coronary;  Surgeon: Cl Rodriguez MD;  Location: [facility] CATH LAB;  Service: Cardiology;;    JOINT REPLACEMENT Bilateral     knee    LEFT HEART CATHETERIZATION Left 3/25/2024    Procedure: Left heart cath;  Surgeon: Vitaliy Brown MD;  Location: [facility] CATH LAB;  Service: Cardiology;  Laterality: Left;    PERCUTANEOUS TRANSLUMINAL BALLOON ANGIOPLASTY OF CORONARY ARTERY  4/3/2024    Procedure: Angioplasty-coronary;  Surgeon: Cl Rodriguez MD;  Location: [facility] CATH LAB;  Service: Cardiology;;    stent-heart      TONSILLECTOMY      TOTAL KNEE ARTHROPLASTY         Review of patient's allergies indicates:   Allergen Reactions    Effexor [venlafaxine] Hives, Shortness Of Breath and Other (See Comments)     Dry mouth and chest pain    Bactrim [sulfamethoxazole-trimethoprim]     Codeine Itching and Other  (See Comments)     Tightness in throat       No current facility-administered medications on file prior to encounter.     Current Outpatient Medications on File Prior to Encounter   Medication Sig    traZODone (DESYREL) 50 MG tablet Take 50 mg by mouth nightly as needed for Insomnia (1-2 tabs at night as needed).    amLODIPine (NORVASC) 5 MG tablet Take 1 tablet (5 mg total) by mouth once daily.    aspirin (ECOTRIN) 81 MG EC tablet Take 81 mg by mouth once daily.      atorvastatin (LIPITOR) 80 MG tablet     clonazePAM (KLONOPIN) 1 MG tablet Take 1 tablet (1 mg total) by mouth 2 (two) times daily.    colchicine (COLCRYS) 0.6 mg tablet Take 1 tablet (0.6 mg total) by mouth once daily.    isosorbide mononitrate (IMDUR) 30 MG 24 hr tablet Take 1 tablet (30 mg total) by mouth once daily.    metoprolol succinate (TOPROL-XL) 50 MG 24 hr tablet Take 1 tablet (50 mg total) by mouth once daily.    multivitamin (THERAGRAN) per tablet Take 1 tablet by mouth once daily.    nitroGLYCERIN (NITROSTAT) 0.4 MG SL tablet Place 1 tablet (0.4 mg total) under the tongue every 5 (five) minutes as needed for Chest pain.    [DISCONTINUED] biotin 1 mg Cap Take 1 capsule by mouth once daily.    [DISCONTINUED] clopidogreL (PLAVIX) 75 mg tablet Take 1 tablet (75 mg total) by mouth once daily.    [DISCONTINUED] docusate sodium (COLACE) 100 MG capsule Take 1 capsule (100 mg total) by mouth 2 (two) times daily. Use while taking Norco to prevent constipation     Family History       Problem Relation (Age of Onset)    Heart disease Mother, Father          Tobacco Use    Smoking status: Never    Smokeless tobacco: Never   Substance and Sexual Activity    Alcohol use: No    Drug use: No    Sexual activity: Yes     Partners: Male     Review of Systems   Cardiovascular:  Negative for chest pain.   Respiratory:  Negative for sputum production.    Gastrointestinal:  Negative for melena.     Objective:     Vital Signs (Most Recent):  Temp: 97.9 °F (36.6  °C) (04/05/24 0755)  Pulse: 68 (04/05/24 0755)  Resp: 18 (04/05/24 0738)  BP: (!) 143/67 (04/05/24 0755)  SpO2: 100 % (04/05/24 0738) Vital Signs (24h Range):  Temp:  [97.8 °F (36.6 °C)-97.9 °F (36.6 °C)] 97.9 °F (36.6 °C)  Pulse:  [59-68] 68  Resp:  [17-26] 18  SpO2:  [94 %-100 %] 100 %  BP: (129-162)/(61-67) 143/67     Weight: 67 kg (147 lb 11.3 oz)  Body mass index is 28.85 kg/m².    SpO2: 100 %         Intake/Output Summary (Last 24 hours) at 4/5/2024 2042  Last data filed at 4/5/2024 0527  Gross per 24 hour   Intake 240 ml   Output 650 ml   Net -410 ml         Lines/Drains/Airways       None                    Physical Exam  Constitutional:       General: She is not in acute distress.     Appearance: She is well-developed. She is obese. She is not ill-appearing, toxic-appearing or diaphoretic.   HENT:      Head: Normocephalic and atraumatic.   Eyes:      General: No scleral icterus.     Extraocular Movements: Extraocular movements intact.      Conjunctiva/sclera: Conjunctivae normal.      Pupils: Pupils are equal, round, and reactive to light.   Neck:      Vascular: No JVD.      Trachea: No tracheal deviation.   Cardiovascular:      Rate and Rhythm: Normal rate and regular rhythm.      Heart sounds: S1 normal and S2 normal. No murmur heard.     No friction rub. No gallop.      Comments: R rad access site c/d/i  Pulmonary:      Effort: Pulmonary effort is normal. No respiratory distress.      Breath sounds: Normal breath sounds. No stridor. No wheezing, rhonchi or rales.   Chest:      Chest wall: No tenderness.   Abdominal:      General: There is no distension.      Palpations: Abdomen is soft.   Musculoskeletal:         General: No swelling or tenderness. Normal range of motion.      Cervical back: Normal range of motion and neck supple. No rigidity.      Right lower leg: No edema.      Left lower leg: No edema.   Skin:     General: Skin is warm and dry.      Coloration: Skin is not jaundiced.   Neurological:       General: No focal deficit present.      Mental Status: She is alert and oriented to person, place, and time.      Cranial Nerves: No cranial nerve deficit.   Psychiatric:         Mood and Affect: Mood normal.         Behavior: Behavior normal.          Current Medications:              Laboratory (all labs reviewed):  CBC:  Recent Labs   Lab 03/26/24 1912 03/27/24  0215 04/03/24 2031 04/04/24 0423 04/05/24  0419   WBC 9.98 9.99 8.93 7.49 6.78   Hemoglobin 13.3 13.6 11.3 L 11.4 L 11.2 L   Hematocrit 39.2 40.9 32.8 L 34.8 L 34.0 L   Platelets 264 235 366 307 290         CHEMISTRIES:  Recent Labs   Lab 03/26/24  0440 03/27/24 0624 04/03/24 2031 04/04/24 0423 04/05/24  0419   Glucose 105 117 H 98 91 93   Sodium 140 137 143 141 139   Potassium 3.9 3.9 3.0 L 3.4 L 3.7   BUN 14 19 13 8 8   Creatinine 0.9 0.7 0.8 0.6 0.7   eGFR >60 >60 >60 >60 >60   Calcium 9.7 9.2 9.3 8.0 L 8.3 L         CARDIAC BIOMARKERS:  Recent Labs   Lab 03/25/24  2257 03/26/24  0748 03/26/24 1912 04/03/24 2031 04/03/24 2250   Troponin I 0.136 H 0.242 H 0.229 H 0.161 H 1.996 H         COAGS:  Recent Labs   Lab 03/25/24  1124 03/26/24 1912 04/03/24 2031   INR 1.0 1.0 1.0         LIPIDS/LFTS:  Recent Labs   Lab 01/26/23  1413 03/25/24  1109 03/26/24  0440 04/03/24 2031   AST 24 24 14 15   ALT 25 16 14 10         BNP:  Recent Labs   Lab 03/25/24  1109 04/03/24 2031    H 43         TSH:        Free T4:        Diagnostic Results:  ECG (personally reviewed and interpreted tracing(s)):  4/3/24 EMS tracing SR with ?AHMET III, recip lat TWI/ST depression, similar to 3/25/24  4/3/24 2021 SR 70, AHMET III improved, ?hyperacute T waves    Chest X-Ray (personally reviewed and interpreted image(s)): 4/3/24 NAD    Echo: 3/25/24 (repeat ordered)    Left Ventricle: There is concentric remodeling. There is normal systolic function with a visually estimated ejection fraction of 55 - 60%. Grade I diastolic dysfunction.    Right Ventricle: Normal  right ventricular cavity size. Systolic function is normal.    Left Atrium: Left atrium is mildly dilated.    Aortic Valve: There is mild aortic regurgitation.    Tricuspid Valve: There is mild regurgitation.    Pulmonary Artery: The estimated pulmonary artery systolic pressure is 24 mmHg.    IVC/SVC: Normal venous pressure at 3 mmHg.    Cath/PCI RCA AST 4/3/24 (images personally reviewed and interpreted)  LVEDP: 5mmHg  LVEF: echo ordered, prev normal LV fxn  Dominance: Right  LM: normal  LAD: mid diffuse 90% stenosis, small caliber distal vessel  LCx: patent prox-mid stents (placed 3/27/24)  RCA: prox-mid stents thrombotic occlusion (placed 3/25/24), faint antonio to RPDA from LAD.  PCI prox-mid RCA AST:  Preop ASA/Plavix, intra-op heparin  Predil 2.5x20 POBA  IVUS for assessment of stents.  ?underexpansion of distal stent edge.  POBA 2.75x30 NC 20 domingo  Post IVUS with excellent stent expansion and apposition, no dissections  Excellent angiographic result, T3 flow, 0% residual stenosis  Hemostasis:  R Radial band  Impression:  NSTEMI  AST of RCA stents (placed 3/25/24) in setting of inadvertent ASA discontinuation  Successful IVUS guided PCI prox-mid RCA POBA  R rad vasband for hemostasis  Plan:  Admit to ICU  ASA 81mg qd indefinitely  Plavix 75mg qd for 1 year (thru Apr 2025), likely indef rx  Statin  Check echo  Outpat card rehab  Follow up with Dr. Brown after discharge.

## 2024-04-08 NOTE — TELEPHONE ENCOUNTER
Spoke with patient daughter, she states that ems came out and assessed patient. Her vitals were good and they believed it was anxiety and it was up to the patient to go to the er. Patient took anxiety medication and symptoms resolved per daughter. Confirmed follow up. arti

## 2024-04-09 ENCOUNTER — NURSE TRIAGE (OUTPATIENT)
Dept: ADMINISTRATIVE | Facility: CLINIC | Age: 79
End: 2024-04-09
Payer: MEDICARE

## 2024-04-09 NOTE — TELEPHONE ENCOUNTER
Pt had recent surgery week and half ago. She has had 3 surgeries in the last couple of weeks. She said she is very very weak. She is having a hard time walking. She has a stent in leg and arm. Hard time tying to eat. Pt wants to know if this weakness is normal?  Care advice recommend pt get a callback from Md office today. Please call and advise pt. Pt is awaiting a return call.   Reason for Disposition   Caller has NON-URGENT question and triager unable to answer question    Additional Information   Negative: Sounds like a life-threatening emergency to the triager   Negative: Bright red, wide-spread, sunburn-like rash   Negative: SEVERE headache and after spinal (epidural) anesthesia   Negative: Vomiting and persists > 4 hours   Negative: Vomiting and abdomen looks much more swollen than usual   Negative: Drinking very little and dehydration suspected (e.g., no urine > 12 hours, very dry mouth, very lightheaded)   Negative: Patient sounds very sick or weak to the triager   Negative: Sounds like a serious complication to the triager   Negative: Fever > 100.4 F (38.0 C)   Negative: Caller has URGENT question and triager unable to answer question   Negative: SEVERE post-op pain (e.g., excruciating, pain scale 8-10) that is not controlled with pain medications   Negative: Headache and after spinal (epidural) anesthesia and not severe   Negative: Fever present > 3 days (72 hours)   Negative: Patient wants to be seen   Negative: MILD TO MODERATE post-op pain (e.g., pain scale 1-7) that is not controlled with pain medications    Protocols used: Post-Op Symptoms and [id number]

## 2024-04-19 ENCOUNTER — OFFICE VISIT (OUTPATIENT)
Dept: CARDIOLOGY | Facility: CLINIC | Age: 79
End: 2024-04-19
Payer: MEDICARE

## 2024-04-19 VITALS
RESPIRATION RATE: 15 BRPM | HEART RATE: 72 BPM | OXYGEN SATURATION: 98 % | DIASTOLIC BLOOD PRESSURE: 72 MMHG | SYSTOLIC BLOOD PRESSURE: 120 MMHG | BODY MASS INDEX: 28.48 KG/M2 | HEIGHT: 60 IN | WEIGHT: 145.06 LBS

## 2024-04-19 DIAGNOSIS — I25.10 CORONARY ARTERY DISEASE, UNSPECIFIED VESSEL OR LESION TYPE, UNSPECIFIED WHETHER ANGINA PRESENT, UNSPECIFIED WHETHER NATIVE OR TRANSPLANTED HEART: ICD-10-CM

## 2024-04-19 DIAGNOSIS — R53.1 WEAKNESS: Primary | ICD-10-CM

## 2024-04-19 DIAGNOSIS — R94.31 ABNORMAL EKG: ICD-10-CM

## 2024-04-19 DIAGNOSIS — I70.0 AORTIC ATHEROSCLEROSIS: ICD-10-CM

## 2024-04-19 PROCEDURE — 1159F MED LIST DOCD IN RCRD: CPT | Mod: CPTII,S$GLB,, | Performed by: INTERNAL MEDICINE

## 2024-04-19 PROCEDURE — G2211 COMPLEX E/M VISIT ADD ON: HCPCS | Mod: S$GLB,,, | Performed by: INTERNAL MEDICINE

## 2024-04-19 PROCEDURE — 1126F AMNT PAIN NOTED NONE PRSNT: CPT | Mod: CPTII,S$GLB,, | Performed by: INTERNAL MEDICINE

## 2024-04-19 PROCEDURE — 1111F DSCHRG MED/CURRENT MED MERGE: CPT | Mod: CPTII,S$GLB,, | Performed by: INTERNAL MEDICINE

## 2024-04-19 PROCEDURE — 3074F SYST BP LT 130 MM HG: CPT | Mod: CPTII,S$GLB,, | Performed by: INTERNAL MEDICINE

## 2024-04-19 PROCEDURE — 99214 OFFICE O/P EST MOD 30 MIN: CPT | Mod: S$GLB,,, | Performed by: INTERNAL MEDICINE

## 2024-04-19 PROCEDURE — 3078F DIAST BP <80 MM HG: CPT | Mod: CPTII,S$GLB,, | Performed by: INTERNAL MEDICINE

## 2024-04-19 PROCEDURE — 1160F RVW MEDS BY RX/DR IN RCRD: CPT | Mod: CPTII,S$GLB,, | Performed by: INTERNAL MEDICINE

## 2024-04-19 PROCEDURE — 1101F PT FALLS ASSESS-DOCD LE1/YR: CPT | Mod: CPTII,S$GLB,, | Performed by: INTERNAL MEDICINE

## 2024-04-19 PROCEDURE — 3288F FALL RISK ASSESSMENT DOCD: CPT | Mod: CPTII,S$GLB,, | Performed by: INTERNAL MEDICINE

## 2024-04-19 PROCEDURE — 99999 PR PBB SHADOW E&M-EST. PATIENT-LVL III: CPT | Mod: PBBFAC,,, | Performed by: INTERNAL MEDICINE

## 2024-04-19 RX ORDER — CLOPIDOGREL BISULFATE 75 MG/1
75 TABLET ORAL DAILY
Qty: 90 TABLET | Refills: 3 | Status: SHIPPED | OUTPATIENT
Start: 2024-04-19 | End: 2025-04-14

## 2024-04-19 RX ORDER — CLOPIDOGREL BISULFATE 75 MG/1
75 TABLET ORAL DAILY
COMMUNITY
End: 2024-04-19

## 2024-04-19 NOTE — PROGRESS NOTES
CARDIOVASCULAR CONSULTATION    REASON FOR CONSULT:   Elizabeth Ayala is a 78 y.o. female who presents for   Chief Complaint   Patient presents with    Hospital Follow Up          HISTORY OF PRESENT ILLNESS:     Patient is a pleasant 78-year-old lady.  Came in with inferior STEMI.  Underwent PCI of RCA and subsequently PCI of circumflex.  Was not compliant with the dual antiplatelet therapy and did not take her aspirin and presented a week later with inferior STEMI for which she was taken to the cath lab which demonstrated that her RCA stent had thrombosed off.  She underwent revascularization of the RCA again.  This was done via the right radial approach by Dr. Marcum.  Patient states that after that the procedure, she felt some weakness in her right hand and difficulty doing fine movements.  She also had some balance issues which have persisted.  The hand weakness is getting better, although the coordination is not.  Otherwise denies any anginal sounding chest pains, orthopnea, PND    Results for orders placed or performed during the hospital encounter of 04/03/24   EKG 12-lead    Collection Time: 04/04/24  6:15 AM   Result Value Ref Range    QRS Duration 86 ms    [facility] QTC Calculation 444 ms    Narrative    Test Reason : R07.89,    Vent. Rate : 060 BPM     Atrial Rate : 060 BPM     P-R Int : 240 ms          QRS Dur : 086 ms      QT Int : 444 ms       P-R-T Axes : 055 -15 139 degrees     QTc Int : 444 ms    Sinus rhythm with 1st degree A-V block  LVH with repolarization abnormality  Inferior infarct , possibly acute    ACUTE MI / STEMI    Consider right ventricular involvement in acute inferior infarct  Abnormal ECG  When compared with ECG of 03-APR-2024 23:00,  Significant changes have occurred  Confirmed by Cl Rodriguez MD ([id number]) on 4/4/2024 3:36:12 PM    Referred By: SAMMY   SELF           Confirmed By:Cl Rodriguez MD          ECHO    Results for orders placed during the hospital encounter of  04/03/24    Echo    Interpretation Summary    Left Ventricle: Regional wall motion abnormalities present (mild basal inferior hypokinesis). There is normal systolic function with a visually estimated ejection fraction of 55 - 60%. Grade I diastolic dysfunction.    Right Ventricle: Normal right ventricular cavity size. Systolic function is normal.    Aortic Valve: The aortic valve is a trileaflet valve. There is mild aortic valve sclerosis. There is mild aortic regurgitation.    Pulmonary Artery: The estimated pulmonary artery systolic pressure is 14 mmHg.            CATH    Results for orders placed during the hospital encounter of 04/03/24    Cardiac catheterization    Conclusion  Description of the findings of the procedure: uneventful LHC/cor angio/IVUS guided PCI mid RCA AST (POBA)/R rad vasband.    Findings/Key Components:  LVEDP: 5mmHg  LVEF: echo ordered, prev normal LV fxn    Dominance: Right  LM: normal  LAD: mid diffuse 90% stenosis, small caliber distal vessel  LCx: patent prox-mid stents (placed 3/27/24)  RCA: prox-mid stents thrombotic occlusion (placed 3/25/24), faint antonio to RPDA from LAD.    PCI prox-mid RCA AST:  Preop ASA/Plavix, intra-op heparin  Predil 2.5x20 POBA  IVUS for assessment of stents.  ?underexpansion of distal stent edge.  POBA 2.75x30 NC 20 domingo  Post IVUS with excellent stent expansion and apposition, no dissections  Excellent angiographic result, T3 flow, 0% residual stenosis    Hemostasis:  R Radial band        Successful IVUS guided PCI of prox to mid circ with COLTON x2 with excellent angiographic results.  IVUS post PCI revealed well-opposed and expanded stents.    The Prox Cx lesion was 95% stenosed with 0% stenosis post-intervention.    The Mid Cx lesion was 80% stenosed with 0% stenosis post-intervention.    Prox Cx lesion: A STENT SYNERGY XD 3.0X24MM stent was successfully placed at 12 DOMINGO for 15 sec.    Mid Cx lesion: A stent was successfully placed at 12 DOMINGO for 9 sec.    The  estimated blood loss was <50 mL.     Coronary angiogram      Left main:  No significant stenosis     Lad:  Mid to distal long 80% stenosis.  Considering the length of this lesion and the diffusely diseased LAD, consider medical management and PCI only if the patient continues to have lifestyle limiting angina despite maximum medical management      Circumflex:  Proximal 95% and mid 80% stenosis.  Successful PCI with COLTON x2      RCA: Previously placed RCA stents are widely patent.        Access:  Right common femoral artery access.  Remove sheath when ACT less than 160        Assessment and plan      Continue aspirin 81 mg daily indefinitely     Plavix 75 mg daily for at least 1 year and longer if no contraindication      Statins and aggressive risk factor modification      There was three vessel coronary artery disease.    The Prox RCA lesion was 99% stenosed with 0% stenosis post-intervention.  This was the culprit vessel for the patient's myocardial infarction    Prox RCA lesion: A STENT SYNERGY XD [id number] stent was successfully placed at 16 ROCÍO for 15 sec.    The estimated blood loss was <50 mL.     Left main:  No significant stenosis     Lad: Mid to distal small diffusely diseased vessel     Circumflex:  Proximal 95%, mid 80% stenosis      RCA:  Culprit vessel:  Proximal heavily calcified subtotal stenosis proximal to previously placed stent.  Successful PCI performed with restoration of DAVID 3 flow and excellent angiographic results.  Resolution of chest pain at end of procedure                  PAST MEDICAL HISTORY:     Past Medical History:   Diagnosis Date    AC (acromioclavicular) joint bone spurs, unspecified laterality     Coronary artery disease     Depression     Hyperlipidemia     Hypertension     Osteoarthritis     ST elevation (STEMI) myocardial infarction of unspecified site     Vaginal delivery     x2       PAST SURGICAL HISTORY:     Past Surgical History:   Procedure Laterality Date     ADENOIDECTOMY      ANGIOGRAM, CORONARY, WITH LEFT HEART CATHETERIZATION N/A 4/3/2024    Procedure: Angiogram, Coronary, with Left Heart Cath;  Surgeon: Cl Rodriguez MD;  Location: [facility] CATH LAB;  Service: Cardiology;  Laterality: N/A;    BREAST SURGERY      biospy    CORONARY STENT PLACEMENT N/A 3/27/2024    Procedure: INSERTION, STENT, CORONARY ARTERY;  Surgeon: Vitaliy Brown MD;  Location: [facility] CATH LAB;  Service: Cardiology;  Laterality: N/A;  PCI circumflex, right common femoral artery access    HYSTERECTOMY      IVUS, CORONARY  3/27/2024    Procedure: IVUS, Coronary;  Surgeon: Vitaliy Brown MD;  Location: [facility] CATH LAB;  Service: Cardiology;;    IVUS, CORONARY  4/3/2024    Procedure: IVUS, Coronary;  Surgeon: Cl Rodriguez MD;  Location: [facility] CATH LAB;  Service: Cardiology;;    JOINT REPLACEMENT Bilateral     knee    LEFT HEART CATHETERIZATION Left 3/25/2024    Procedure: Left heart cath;  Surgeon: Vitaliy Brown MD;  Location: [facility] CATH LAB;  Service: Cardiology;  Laterality: Left;    PERCUTANEOUS TRANSLUMINAL BALLOON ANGIOPLASTY OF CORONARY ARTERY  4/3/2024    Procedure: Angioplasty-coronary;  Surgeon: Cl Rodriguez MD;  Location: [facility] CATH LAB;  Service: Cardiology;;    stent-heart      TONSILLECTOMY      TOTAL KNEE ARTHROPLASTY             SOCIAL HISTORY:     Social History     Socioeconomic History    Marital status:    Tobacco Use    Smoking status: Never    Smokeless tobacco: Never   Substance and Sexual Activity    Alcohol use: No    Drug use: No    Sexual activity: Yes     Partners: Male     Social Determinants of Health     Financial Resource Strain: Low Risk  (4/5/2024)    Overall Financial Resource Strain (CARDIA)     Difficulty of Paying Living Expenses: Not hard at all   Food Insecurity: No Food Insecurity (4/5/2024)    Hunger Vital Sign     Worried About Running Out of Food in the Last Year: Never true     Ran Out of Food in the Last Year: Never true    Transportation Needs: No Transportation Needs (4/5/2024)    PRAPARE - Transportation     Lack of Transportation (Medical): No     Lack of Transportation (Non-Medical): No   Recent Concern: Transportation Needs - Unmet Transportation Needs (3/26/2024)    PRAPARE - Transportation     Lack of Transportation (Medical): Yes     Lack of Transportation (Non-Medical): Yes   Stress: No Stress Concern Present (4/5/2024)    [employer] - Occupational Stress Questionnaire     Feeling of Stress : Only a little   Housing Stability: Low Risk  (4/5/2024)    Housing Stability Vital Sign     Unable to Pay for Housing in the Last Year: No     Number of Places Lived in the Last Year: 1     Unstable Housing in the Last Year: No   Recent Concern: Housing Stability - High Risk (3/26/2024)    Housing Stability Vital Sign     Unable to Pay for Housing in the Last Year: No     Number of Places Lived in the Last Year: 1     Unstable Housing in the Last Year: Yes       FAMILY HISTORY:     Family History   Problem Relation Name Age of Onset    Heart disease Mother      Heart disease Father         REVIEW OF SYSTEMS:   Review of Systems   Constitutional: Negative.   HENT: Negative.     Eyes: Negative.    Endocrine: Negative.    Hematologic/Lymphatic: Negative.    Skin: Negative.    Musculoskeletal: Negative.    Gastrointestinal: Negative.    Genitourinary: Negative.    Neurological:  Positive for loss of balance.   Psychiatric/Behavioral: Negative.     Allergic/Immunologic: Negative.        A 10 point review of systems was performed and all the pertinent positives have been mentioned. Rest of review of systems was negative.        PHYSICAL EXAM:     Vitals:    04/19/24 1342   BP: 120/72   Pulse: 72   Resp: 15    Body mass index is 28.33 kg/m².  Weight: 65.8 kg (145 lb 1 oz)   Height: 5' (152.4 cm)     Physical Exam  Constitutional:       Appearance: Normal appearance. She is well-developed.   HENT:      Head:  Normocephalic.   Eyes:      Pupils: Pupils are equal, round, and reactive to light.   Cardiovascular:      Rate and Rhythm: Normal rate and regular rhythm.   Pulmonary:      Effort: Pulmonary effort is normal.      Breath sounds: Normal breath sounds.   Abdominal:      General: Bowel sounds are normal.      Palpations: Abdomen is soft.      Tenderness: There is no abdominal tenderness.   Musculoskeletal:         General: Normal range of motion.      Cervical back: Normal range of motion and neck supple.   Skin:     General: Skin is warm.   Neurological:      Mental Status: She is alert and oriented to person, place, and time.           DATA:     Laboratory:  CBC:  Recent Labs   Lab 04/03/24 2031 04/04/24 0423 04/05/24 0419   WBC 8.93 7.49 6.78   Hemoglobin 11.3 L 11.4 L 11.2 L   Hematocrit 32.8 L 34.8 L 34.0 L   Platelets 366 307 290       CHEMISTRIES:  Recent Labs   Lab 01/26/23  1413 04/06/23  1021 05/17/23  1258 03/25/24  1109 04/03/24 2031 04/04/24 0423 04/05/24 0419   Glucose  --   --   --    < > 98 91 93   Sodium 136 138 141   < > 143 141 139   Potassium 4.3 3.7 3.9   < > 3.0 L 3.4 L 3.7   BUN 17.9 16.7 20.1 H   < > 13 8 8   Creatinine 0.77 0.76 0.70   < > 0.8 0.6 0.7   eGFR  78 L 80 L 88 L  --   --   --   --    Calcium 9.6 9.1 8.8   < > 9.3 8.0 L 8.3 L    < > = values in this interval not displayed.       CARDIAC BIOMARKERS:  Recent Labs   Lab 03/26/24 1912 04/03/24 2031 04/03/24  2250   Troponin I 0.229 H 0.161 H 1.996 H       COAGS:  Recent Labs   Lab 03/25/24  1124 03/26/24 1912 04/03/24 2031   INR 1.0 1.0 1.0       LIPIDS/LFTS:  Recent Labs   Lab 03/25/24  1109 03/26/24  0440 04/03/24 2031   AST 24 14 15   ALT 16 14 10       Hemoglobin A1C   Date Value Ref Range Status   12/11/2007 5.5 4.5 - 6.2 % Final     Hemoglobin A1c   Date Value Ref Range Status   03/25/2021 4.9 <5.7 % of total Hgb Final     Comment:     For the purpose of screening for the presence of  diabetes:    <5.7%        Consistent with the absence of diabetes  5.7-6.4%    Consistent with increased risk for diabetes              (prediabetes)  > or =6.5%  Consistent with diabetes    This assay result is consistent with a decreased risk  of diabetes.    Currently, no consensus exists regarding use of  hemoglobin A1c for diagnosis of diabetes in children.    According to American Diabetes Association (ADA)  guidelines, hemoglobin A1c <7.0% represents optimal  control in non-pregnant diabetic patients. Different  metrics may apply to specific patient populations.   Standards of Medical Care in Diabetes(ADA).           TSH        The ASCVD Risk score (Zack VALENCIA, et al., 2019) failed to calculate for the following reasons:    The 2019 ASCVD risk score is only valid for ages 40 to 79    The patient has a prior MI or stroke diagnosis       BNP    Lab Results   Component Value Date/Time    BNP 43 04/03/2024 08:31 PM     (H) 03/25/2024 11:09 AM    BNP 85 04/29/2015 03:31 PM         ASSESSMENT AND PLAN     Patient Active Problem List   Diagnosis    Depression    Essential hypertension, benign    Coronary artery disease involving native coronary artery of native heart with unstable angina pectoris    Hypertension    Mixed hyperlipidemia    STEMI (ST elevation myocardial infarction)    Dressler's syndrome    NSTEMI (non-ST elevated myocardial infarction)    Normocytic anemia         ALLERGIES AND MEDICATION:     Review of patient's allergies indicates:   Allergen Reactions    Effexor [venlafaxine] Hives, Shortness Of Breath and Other (See Comments)     Dry mouth and chest pain    Bactrim [sulfamethoxazole-trimethoprim]     Codeine Itching and Other (See Comments)     Tightness in throat        Medication List            Accurate as of April 19, 2024  3:47 PM. If you have any questions, ask your nurse or doctor.                CONTINUE taking these medications      amLODIPine 5 MG tablet  Commonly known as: NORVASC  Take 1 tablet (5  mg total) by mouth once daily.     aspirin 81 MG EC tablet  Commonly known as: ECOTRIN     atorvastatin 80 MG tablet  Commonly known as: LIPITOR     clonazePAM 1 MG tablet  Commonly known as: KlonoPIN  Take 1 tablet (1 mg total) by mouth 2 (two) times daily.     clopidogreL 75 mg tablet  Commonly known as: PLAVIX  Take 1 tablet (75 mg total) by mouth once daily.     colchicine 0.6 mg tablet  Commonly known as: COLCRYS  Take 1 tablet (0.6 mg total) by mouth once daily.     isosorbide mononitrate 30 MG 24 hr tablet  Commonly known as: IMDUR  Take 1 tablet (30 mg total) by mouth once daily.     metoprolol succinate 50 MG 24 hr tablet  Commonly known as: TOPROL-XL  Take 1 tablet (50 mg total) by mouth once daily.     multivitamin per tablet  Commonly known as: THERAGRAN     nitroGLYCERIN 0.4 MG SL tablet  Commonly known as: NITROSTAT  Place 1 tablet (0.4 mg total) under the tongue every 5 (five) minutes as needed for Chest pain.     traZODone 50 MG tablet  Commonly known as: DESYREL            STOP taking these medications      ticagrelor 90 mg tablet  Commonly known as: BRILINTA  Stopped by: Vitaliy Brown MD               Where to Get Your Medications        These medications were sent to [facility]/pharmacy #[id number] - [place] - [address]  [address] [place] [address]      Phone: [phone]   clopidogreL 75 mg tablet         Orders Placed This Encounter   Procedures    Ambulatory referral/consult to Neurology       Coronary artery disease status post PCI of RCA and circumflex.  Doing fine.  No anginal sounding chest pains.  Could not afford Brilinta.  On Plavix.  Continue aspirin and Plavix.    Loss of balance and right arm weakness after 3rd angiogram.  Weakness has gotten significantly better.  Still has some balance issues.  I have referred her to Neurology for further evaluation and management.  Could be an embolic stroke after last angiogram      Aggressive risk factor modification with  goal LDL less than 70    Lab Results   Component Value Date    LDLCALC 75.8 06/08/2016     Referral to cardiac rehab has been made    Follow-up in Cardiology Clinic in 3 m    Visit today included increased complexity associated with the care of the episodic problem cad,  addressed and managing the longitudinal care of the patient due to the serious and/or complex managed problem(s)   Patient Active Problem List   Diagnosis    Depression    Essential hypertension, benign    Coronary artery disease involving native coronary artery of native heart with unstable angina pectoris    Hypertension    Mixed hyperlipidemia    STEMI (ST elevation myocardial infarction)    Dressler's syndrome    NSTEMI (non-ST elevated myocardial infarction)    Normocytic anemia     .      Thank you very much for involving me in the care of your patient.  Please do not hesitate to contact me if there are any questions.      Vitaliy Brown MD, FACC, [facility]  Interventional Cardiologist, [facility].           This note was dictated with the help of speech recognition software.  There might be un-intended errors and/or substitutions.

## 2024-04-23 ENCOUNTER — TELEPHONE (OUTPATIENT)
Dept: CARDIOLOGY | Facility: CLINIC | Age: 79
End: 2024-04-23
Payer: MEDICARE

## 2024-04-23 NOTE — TELEPHONE ENCOUNTER
----- Message from Merna Seth sent at 4/23/2024 10:37 AM CDT -----  Regarding: Requesting advice  .Type:  Needs Medical Advice/Symptom-based Call    Who Called: self     Symptoms (please be specific): stomach pains/ gas problems , states she was prescribed multiple medications after her hospital stay and thinks it could be the medications causing the problems, asking how she can find out     How long has patient had these symptoms:  2 weeks(since she came home from the hospital)    Would the patient rather a call back or a response via My [facility]? Call     Best Call Back Number: [phone]      Additional Information:

## 2024-04-23 NOTE — TELEPHONE ENCOUNTER
Patient states that ever since she started the medication that prescribed to her at the hospital by Dr. Brown, she has been experiencing stomach discomfort such as stomach acid, cramping and frequent bowel movement and it is getting uncomfortable for her. She would like to know which medicine could be potentially doing this to her so that she can stop it. Please advise

## 2024-04-24 ENCOUNTER — TELEPHONE (OUTPATIENT)
Dept: CARDIOLOGY | Facility: CLINIC | Age: 79
End: 2024-04-24
Payer: MEDICARE

## 2024-04-24 NOTE — TELEPHONE ENCOUNTER
Spoke with Ms. Ayala and explained that Dr. Brown says that this was discussed during her visit, patient acknowledged. Recommended that she visit her PCP or the ER for assistance with her symptoms. Patient referred that she will visit her PCP for assistance. No further help requested.

## 2024-05-02 DIAGNOSIS — G81.90 HEMIPLEGIA, UNSPECIFIED AFFECTING UNSPECIFIED SIDE: Primary | ICD-10-CM

## 2024-05-09 NOTE — PROGRESS NOTES
----- Message from Gita Mcnair sent at 5/9/2024  1:51 PM CDT -----  Contact: self  Type:  Needs Medical Advice    Who Called: Pt  Best Call Back Number for patient:  [phone]  Additional Information:  Pt states the pharmacy is awaiting something from the ofc for the insurance company for nitroglycerin (NITROGLYN) 0.2 % ointment  [facility] - [place] - [address] [facility]  [address] [place] [address]  Phone: [phone] Fax: [phone]  Please call pt to advise... Thank you...   Subjective:       Patient ID: Elizabeth Ayala is a 73 y.o. female.    Chief Complaint: Recheck    HPI 72 yo female with major duct excision for intraductal papilloma with some vague pain on 6 months   Review of Systems   Constitutional: Negative.    HENT: Negative.    Eyes: Negative.    Respiratory: Negative.    Cardiovascular: Negative.    Gastrointestinal: Negative.    Endocrine: Negative.    Musculoskeletal: Negative.    Skin: Negative.    Allergic/Immunologic: Negative.    Neurological: Negative.    Hematological: Negative.    Psychiatric/Behavioral: Negative.    All other systems reviewed and are negative.      Objective:      Physical Exam   Constitutional: She is oriented to person, place, and time. She appears well-developed and well-nourished.   HENT:   Head: Normocephalic and atraumatic.   Right Ear: External ear normal.   Left Ear: External ear normal.   Nose: Nose normal.   Mouth/Throat: Oropharynx is clear and moist.   Eyes: Conjunctivae and EOM are normal. Pupils are equal, round, and reactive to light.   Neck: Normal range of motion. Neck supple.   Cardiovascular: Normal rate, regular rhythm, normal heart sounds and intact distal pulses.   Pulmonary/Chest: Effort normal and breath sounds normal. Right breast exhibits no inverted nipple, no mass, no nipple discharge, no skin change and no tenderness. Left breast exhibits no inverted nipple, no mass, no nipple discharge, no skin change and no tenderness.       Abdominal: Soft. Bowel sounds are normal.   Musculoskeletal: Normal range of motion.   Neurological: She is alert and oriented to person, place, and time. She has normal reflexes.   Skin: Skin is warm and dry.   Psychiatric: She has a normal mood and affect. Her behavior is normal. Thought content normal.   Vitals reviewed.      Assessment:       1. Intraductal papilloma of breast, right      doing well  Plan:       I will see her prn

## 2024-05-22 RX ORDER — AMLODIPINE BESYLATE 5 MG/1
5 TABLET ORAL DAILY
Qty: 30 TABLET | Refills: 11 | Status: SHIPPED | OUTPATIENT
Start: 2024-05-22 | End: 2025-05-22

## 2024-05-22 NOTE — TELEPHONE ENCOUNTER
----- Message from Mallika Richey sent at 5/22/2024  1:20 PM CDT -----  .Type: RX Refill Request    Who Called: Self     Have you contacted your pharmacy: No     Refill or New Rx: New Rx     RX Name and Strength:amLODIPine (NORVASC) 5 MG tablet    Preferred Pharmacy with phone number:.  [facility]/pharmacy #[id number] - [place] - [address]  [address] [place] [address]  Phone: [phone] Fax: [phone]        Local or Mail Order: Local     Ordering Provider: Kevin     Would the patient rather a call back or a response via [facility]? Call Back     Best Call Back Number: .[phone] (home)       Additional Information:

## 2024-06-14 ENCOUNTER — HOSPITAL ENCOUNTER (OUTPATIENT)
Dept: RADIOLOGY | Facility: HOSPITAL | Age: 79
Discharge: HOME | End: 2024-06-14
Attending: PSYCHIATRY & NEUROLOGY
Payer: MEDICARE

## 2024-06-14 DIAGNOSIS — I63.40 CEREBRAL INFARCTION DUE TO EMBOLISM OF UNSPECIFIED CEREBRAL ARTERY: ICD-10-CM

## 2024-06-14 PROCEDURE — 70551 MRI BRAIN STEM W/O DYE: CPT | Mod: TC

## 2024-06-14 PROCEDURE — 70544 MR ANGIOGRAPHY HEAD W/O DYE: CPT | Mod: TC

## 2024-06-14 PROCEDURE — 93880 EXTRACRANIAL BILAT STUDY: CPT | Mod: TC

## 2024-06-24 PROBLEM — I21.3 STEMI (ST ELEVATION MYOCARDIAL INFARCTION): Status: RESOLVED | Noted: 2024-03-25 | Resolved: 2024-06-24

## 2024-07-04 ENCOUNTER — HOSPITAL ENCOUNTER (EMERGENCY)
Facility: HOSPITAL | Age: 79
Discharge: HOME | End: 2024-07-04
Attending: EMERGENCY MEDICINE
Payer: MEDICARE

## 2024-07-04 VITALS
HEIGHT: 60 IN | OXYGEN SATURATION: 99 % | RESPIRATION RATE: 19 BRPM | WEIGHT: 148 LBS | TEMPERATURE: 99 F | BODY MASS INDEX: 29.06 KG/M2 | DIASTOLIC BLOOD PRESSURE: 69 MMHG | HEART RATE: 58 BPM | SYSTOLIC BLOOD PRESSURE: 152 MMHG

## 2024-07-04 DIAGNOSIS — R07.9 CHEST PAIN WITH LOW RISK FOR CARDIAC ETIOLOGY: ICD-10-CM

## 2024-07-04 DIAGNOSIS — R07.89 LEFT-SIDED CHEST WALL PAIN: Primary | ICD-10-CM

## 2024-07-04 LAB
ALBUMIN SERPL BCP-MCNC: 3.4 G/DL (ref 3.5–5.2)
ALP SERPL-CCNC: 80 U/L (ref 55–135)
ALT SERPL W/O P-5'-P-CCNC: 21 U/L (ref 10–44)
ANION GAP SERPL CALC-SCNC: 10 MMOL/L (ref 8–16)
AST SERPL-CCNC: 20 U/L (ref 10–40)
BASOPHILS # BLD AUTO: 0.02 K/UL (ref 0–0.2)
BASOPHILS NFR BLD: 0.4 % (ref 0–1.9)
BILIRUB SERPL-MCNC: 0.6 MG/DL (ref 0.1–1)
BUN SERPL-MCNC: 22 MG/DL (ref 8–23)
CALCIUM SERPL-MCNC: 9.2 MG/DL (ref 8.7–10.5)
CHLORIDE SERPL-SCNC: 107 MMOL/L (ref 95–110)
CO2 SERPL-SCNC: 21 MMOL/L (ref 23–29)
CREAT SERPL-MCNC: 0.7 MG/DL (ref 0.5–1.4)
DIFFERENTIAL METHOD BLD: ABNORMAL
EOSINOPHIL # BLD AUTO: 0.1 K/UL (ref 0–0.5)
EOSINOPHIL NFR BLD: 2.2 % (ref 0–8)
ERYTHROCYTE [DISTWIDTH] IN BLOOD BY AUTOMATED COUNT: 12.9 % (ref 11.5–14.5)
EST. GFR  (NO RACE VARIABLE): >60 ML/MIN/1.73 M^2
GLUCOSE SERPL-MCNC: 105 MG/DL (ref 70–110)
HCT VFR BLD AUTO: 37.3 % (ref 37–48.5)
HGB BLD-MCNC: 13.3 G/DL (ref 12–16)
IMM GRANULOCYTES # BLD AUTO: 0.02 K/UL (ref 0–0.04)
IMM GRANULOCYTES NFR BLD AUTO: 0.4 % (ref 0–0.5)
INR PPP: 1.1 (ref 0.8–1.2)
LYMPHOCYTES # BLD AUTO: 1.1 K/UL (ref 1–4.8)
LYMPHOCYTES NFR BLD: 22.6 % (ref 18–48)
MCH RBC QN AUTO: 28.3 PG (ref 27–31)
MCHC RBC AUTO-ENTMCNC: 35.7 G/DL (ref 32–36)
MCV RBC AUTO: 79 FL (ref 82–98)
MONOCYTES # BLD AUTO: 0.5 K/UL (ref 0.3–1)
MONOCYTES NFR BLD: 9.7 % (ref 4–15)
NEUTROPHILS # BLD AUTO: 3.2 K/UL (ref 1.8–7.7)
NEUTROPHILS NFR BLD: 64.7 % (ref 38–73)
NRBC BLD-RTO: 0 /100 WBC
OHS QRS DURATION: 90 MS
OHS QTC CALCULATION: 403 MS
PLATELET # BLD AUTO: 205 K/UL (ref 150–450)
PMV BLD AUTO: 9.8 FL (ref 9.2–12.9)
POTASSIUM SERPL-SCNC: 3.7 MMOL/L (ref 3.5–5.1)
PROT SERPL-MCNC: 6.1 G/DL (ref 6–8.4)
PROTHROMBIN TIME: 11.6 SEC (ref 9–12.5)
RBC # BLD AUTO: 4.7 M/UL (ref 4–5.4)
SODIUM SERPL-SCNC: 138 MMOL/L (ref 136–145)
TROPONIN I SERPL DL<=0.01 NG/ML-MCNC: <0.006 NG/ML (ref 0–0.03)
WBC # BLD AUTO: 4.96 K/UL (ref 3.9–12.7)

## 2024-07-04 PROCEDURE — 84484 ASSAY OF TROPONIN QUANT: CPT | Performed by: EMERGENCY MEDICINE

## 2024-07-04 PROCEDURE — 85025 COMPLETE CBC W/AUTO DIFF WBC: CPT | Performed by: EMERGENCY MEDICINE

## 2024-07-04 PROCEDURE — 63600175 PHARM REV CODE 636 W HCPCS: Performed by: EMERGENCY MEDICINE

## 2024-07-04 PROCEDURE — 96375 TX/PRO/DX INJ NEW DRUG ADDON: CPT

## 2024-07-04 PROCEDURE — 93005 ELECTROCARDIOGRAM TRACING: CPT

## 2024-07-04 PROCEDURE — 80053 COMPREHEN METABOLIC PANEL: CPT | Performed by: EMERGENCY MEDICINE

## 2024-07-04 PROCEDURE — 99285 EMERGENCY DEPT VISIT HI MDM: CPT | Mod: 25

## 2024-07-04 PROCEDURE — 85610 PROTHROMBIN TIME: CPT | Performed by: EMERGENCY MEDICINE

## 2024-07-04 PROCEDURE — 96374 THER/PROPH/DIAG INJ IV PUSH: CPT

## 2024-07-04 PROCEDURE — 93010 ELECTROCARDIOGRAM REPORT: CPT | Mod: ,,, | Performed by: INTERNAL MEDICINE

## 2024-07-04 RX ORDER — PREDNISONE 20 MG/1
60 TABLET ORAL DAILY
Qty: 9 TABLET | Refills: 0 | Status: SHIPPED | OUTPATIENT
Start: 2024-07-05 | End: 2024-07-08

## 2024-07-04 RX ORDER — DEXAMETHASONE SODIUM PHOSPHATE 4 MG/ML
10 INJECTION, SOLUTION INTRA-ARTICULAR; INTRALESIONAL; INTRAMUSCULAR; INTRAVENOUS; SOFT TISSUE
Status: COMPLETED | OUTPATIENT
Start: 2024-07-04 | End: 2024-07-04

## 2024-07-04 RX ORDER — KETOROLAC TROMETHAMINE 30 MG/ML
15 INJECTION, SOLUTION INTRAMUSCULAR; INTRAVENOUS
Status: COMPLETED | OUTPATIENT
Start: 2024-07-04 | End: 2024-07-04

## 2024-07-04 RX ORDER — HYDROCODONE BITARTRATE AND ACETAMINOPHEN 5; 325 MG/1; MG/1
1 TABLET ORAL EVERY 6 HOURS PRN
Qty: 8 TABLET | Refills: 0 | Status: SHIPPED | OUTPATIENT
Start: 2024-07-04

## 2024-07-04 RX ADMIN — KETOROLAC TROMETHAMINE 15 MG: 30 INJECTION, SOLUTION INTRAMUSCULAR at 10:07

## 2024-07-04 RX ADMIN — DEXAMETHASONE SODIUM PHOSPHATE 10 MG: 4 INJECTION INTRA-ARTICULAR; INTRALESIONAL; INTRAMUSCULAR; INTRAVENOUS; SOFT TISSUE at 10:07

## 2024-07-04 NOTE — DISCHARGE INSTRUCTIONS
Tylenol 1000 mg by mouth every 8 hours as needed for pain or Tylenol with hydrocodone as directed.  Prednisone as directed, 1st dose tomorrow.  Please follow-up with your cardiologist, or your primary care doctor in 3 days.  Return immediately if you get worse or if new problems develop.  Rest.

## 2024-07-04 NOTE — ED PROVIDER NOTES
Encounter Date: 7/4/2024    SCRIBE #1 NOTE: I, Amy Mccallum, am scribing for, and in the presence of,  Mykel Del Rio MD. I have scribed the following portions of the note - Other sections scribed: HPI, ROS.       History     Chief Complaint   Patient presents with    Chest Pain     Pt c/o intermittent, non radiating mid sternal chest pain which presented yesterday while pt was at rest at home. Pt states she has experienced such symptoms a few months ago and was hospitalized for stent placement. Pt denies SOB, n/v/d     This is a 78 year old female, with a past medical history of CAD, HLD, HTN, MI w/ three stent placement, who presents to the emergency department complaining of intermittent, non radiating mid sternal chest pain, symptoms onset yesterday morning. Patient states that her chest pain is worsened with deep inhalation and is a dull/aching pain lasting for 2 minutes. Patient endorses use of Plavix. Patient denies hx of blood clots in legs/lungs, hx of cancer, recent trips, or recent surgeries. Patient denies cough, shortness of breath, leg swelling, diaphoresis, fever, chills, abdominal pain, nausea, vomiting, diarrhea, dysuria, headaches, congestion, sore throat, arm or leg trouble, eye pain, ear pain, rash, or other associated symptoms. No other alleviating or exacerbating factors. This is the extent of the patient's complaints in the ED.                The history is provided by the patient. No  was used.     Review of patient's allergies indicates:   Allergen Reactions    Effexor [venlafaxine] Hives, Shortness Of Breath and Other (See Comments)     Dry mouth and chest pain    Bactrim [sulfamethoxazole-trimethoprim]     Codeine Itching and Other (See Comments)     Tightness in throat     Past Medical History:   Diagnosis Date    AC (acromioclavicular) joint bone spurs, unspecified laterality     Coronary artery disease     Depression     Hyperlipidemia     Hypertension      Osteoarthritis     ST elevation (STEMI) myocardial infarction of unspecified site     Vaginal delivery     x2     Past Surgical History:   Procedure Laterality Date    ADENOIDECTOMY      ANGIOGRAM, CORONARY, WITH LEFT HEART CATHETERIZATION N/A 4/3/2024    Procedure: Angiogram, Coronary, with Left Heart Cath;  Surgeon: Cl Rodriguez MD;  Location: [facility] CATH LAB;  Service: Cardiology;  Laterality: N/A;    BREAST SURGERY      biospy    CORONARY STENT PLACEMENT N/A 3/27/2024    Procedure: INSERTION, STENT, CORONARY ARTERY;  Surgeon: Vitaliy Brown MD;  Location: [facility] CATH LAB;  Service: Cardiology;  Laterality: N/A;  PCI circumflex, right common femoral artery access    HYSTERECTOMY      IVUS, CORONARY  3/27/2024    Procedure: IVUS, Coronary;  Surgeon: Vitaliy Brown MD;  Location: [facility] CATH LAB;  Service: Cardiology;;    IVUS, CORONARY  4/3/2024    Procedure: IVUS, Coronary;  Surgeon: Cl Rodriguez MD;  Location: [facility] CATH LAB;  Service: Cardiology;;    JOINT REPLACEMENT Bilateral     knee    LEFT HEART CATHETERIZATION Left 3/25/2024    Procedure: Left heart cath;  Surgeon: Vitaliy Brown MD;  Location: [facility] CATH LAB;  Service: Cardiology;  Laterality: Left;    PERCUTANEOUS TRANSLUMINAL BALLOON ANGIOPLASTY OF CORONARY ARTERY  4/3/2024    Procedure: Angioplasty-coronary;  Surgeon: Cl Rodriguez MD;  Location: [facility] CATH LAB;  Service: Cardiology;;    stent-heart      TONSILLECTOMY      TOTAL KNEE ARTHROPLASTY       Family History   Problem Relation Name Age of Onset    Heart disease Mother      Heart disease Father       Social History     Tobacco Use    Smoking status: Never    Smokeless tobacco: Never   Substance Use Topics    Alcohol use: No    Drug use: No     Review of Systems   Constitutional:  Negative for chills, diaphoresis and fever.   HENT:  Negative for congestion, ear pain and sore throat.    Eyes:  Negative for pain and visual disturbance.   Respiratory:  Negative for cough and  shortness of breath.    Cardiovascular:  Positive for chest pain. Negative for leg swelling.   Gastrointestinal:  Negative for abdominal pain, diarrhea, nausea and vomiting.   Genitourinary:  Negative for dysuria.   Musculoskeletal:  Negative for myalgias.   Skin:  Negative for rash.   Neurological:  Negative for headaches.       Physical Exam     Initial Vitals [07/04/24 0938]   BP Pulse Resp Temp SpO2   120/62 60 19 98.5 °F (36.9 °C) 95 %      MAP       --         Physical Exam  The patient was examined specifically for the following:   General:No significant distress, Good color, Warm and dry. Head and neck:Scalp atraumatic, Neck supple. Neurological:Appropriate conversation, Gross motor deficits. Eyes:Conjugate gaze, Clear corneas. ENT: No epistaxis. Cardiac: Regular rate and rhythm, Grossly normal heart tones. Pulmonary: Wheezing, Rales. Gastrointestinal: Abdominal tenderness, Abdominal distention. Musculoskeletal: Extremity deformity, Apparent pain with range of motion of the joints. Skin: Rash.   The findings on examination were normal except for the following:  The lungs are clear.  The heart tones are normal.  The abdomen is soft.  There is marked point tenderness of the left lower sternal border.  There is no swelling or tenderness of the lower extremities.  There is no tachycardia oxygen saturations are 95%.  ED Course   Procedures  Labs Reviewed   COMPREHENSIVE METABOLIC PANEL - Abnormal; Notable for the following components:       Result Value    CO2 21 (*)     Albumin 3.4 (*)     All other components within normal limits   CBC W/ AUTO DIFFERENTIAL - Abnormal; Notable for the following components:    MCV 79 (*)     All other components within normal limits   TROPONIN I   PROTIME-INR     EKG Readings: (Independently Interpreted)   This patient is in a sinus rhythm with a first-degree AV block.  There are no significant ST segment or T-wave changes.  There is poor R-wave progression across precordium.   There is no evidence of acute myocardial infarction or malignant arrhythmia.       Imaging Results              X-Ray Chest AP Portable (Preliminary result)  Result time 07/04/24 11:09:37      Wet Read by Mykel Del Rio MD (07/04/24 11:09:37, [facility] - Emergency Dept, Emergency Medicine)    Negative for causes of chest pain                                     Medications   ketorolac injection 15 mg (15 mg Intravenous Given 7/4/24 1025)   dexAMETHasone injection 10 mg (10 mg Intravenous Given 7/4/24 1025)     Medical Decision Making  Amount and/or Complexity of Data Reviewed  Labs: ordered.  Radiology: ordered and independent interpretation performed.    Risk  Prescription drug management.    Given the above, this patient has a history of coronary artery disease and a stent.  She has well localized left lower sternal border pain that is sharper worse with deep breathing.  The patient is also tender at this location.  Palpation reproduces the pain of the chief complaint.  The patient has no shortness of breath or tachycardia.  I doubt pulmonary embolus.  Coronary artery disease is considered the patient has had pain for 24 hours troponin is negative.  I do not think this is a myocardial infarction.  Nonetheless I did offer the patient admission to the hospital for observation.  She declines wishing instead to go home.  The pain is well localized, worse with deep breathing and the patient has point tenderness exactly where she has her pain.  It has been present for about 24 hours I do not think a 2nd troponin will add diagnostic value.  Chest x-ray still reveal pneumothorax pneumonia pleural effusion.  This patient is low risk for pulmonary embolus she does not have tachycardia leg swelling or shortness of breath.         Scribe Attestation:   Scribe #1: I performed the above scribed service and the documentation accurately describes the services I performed. I attest to the accuracy of the note.           Please  note that the documentation on this chart was provided by the scribe above on the date of service noted above, and that the documentation in the chart accurately reflects the work and decisions made by me alone.  Signed, Dr. Del Rio                      Clinical Impression:  Final diagnoses:  [R07.9] Chest pain with low risk for cardiac etiology  [R07.89] Left-sided chest wall pain (Primary)          ED Disposition Condition    Discharge Stable          ED Prescriptions       Medication Sig Dispense Start Date End Date Auth. Provider    predniSONE (DELTASONE) 20 MG tablet Take 3 tablets (60 mg total) by mouth once daily. for 3 days 9 tablet 7/5/2024 7/8/2024 Mykel Del Rio MD    HYDROcodone-acetaminophen (NORCO) 5-325 mg per tablet Take 1 tablet by mouth every 6 (six) hours as needed for Pain. 8 tablet 7/4/2024 -- Mykel Del Rio MD          Follow-up Information       Follow up With Specialties Details Why Contact Info    Vitaliy Brown MD Cardiology, Interventional Cardiology In 3 days  [address]  [address]  [facility] [address]  [phone]      Amna Levy MD General Practice In 3 days  [address]  [address]  [facility] [address]  [phone]               Mykel Del Rio MD  07/04/24 1126       Mykel Del Rio MD  07/04/24 0931

## 2024-07-08 PROBLEM — I21.4 NSTEMI (NON-ST ELEVATED MYOCARDIAL INFARCTION): Status: RESOLVED | Noted: 2024-04-03 | Resolved: 2024-07-08

## 2024-07-29 ENCOUNTER — OFFICE VISIT (OUTPATIENT)
Dept: CARDIOLOGY | Facility: CLINIC | Age: 79
End: 2024-07-29
Payer: MEDICARE

## 2024-07-29 VITALS
HEIGHT: 65 IN | DIASTOLIC BLOOD PRESSURE: 72 MMHG | BODY MASS INDEX: 23.11 KG/M2 | OXYGEN SATURATION: 99 % | SYSTOLIC BLOOD PRESSURE: 132 MMHG | HEART RATE: 62 BPM | WEIGHT: 138.69 LBS

## 2024-07-29 DIAGNOSIS — I25.10 CORONARY ARTERY DISEASE INVOLVING NATIVE CORONARY ARTERY OF NATIVE HEART, UNSPECIFIED WHETHER ANGINA PRESENT: Primary | ICD-10-CM

## 2024-07-29 DIAGNOSIS — I25.10 CORONARY ARTERY DISEASE, UNSPECIFIED VESSEL OR LESION TYPE, UNSPECIFIED WHETHER ANGINA PRESENT, UNSPECIFIED WHETHER NATIVE OR TRANSPLANTED HEART: ICD-10-CM

## 2024-07-29 DIAGNOSIS — R94.31 ABNORMAL EKG: ICD-10-CM

## 2024-07-29 DIAGNOSIS — R53.1 WEAKNESS: ICD-10-CM

## 2024-07-29 DIAGNOSIS — I70.0 AORTIC ATHEROSCLEROSIS: ICD-10-CM

## 2024-07-29 PROCEDURE — 3078F DIAST BP <80 MM HG: CPT | Mod: CPTII,S$GLB,, | Performed by: INTERNAL MEDICINE

## 2024-07-29 PROCEDURE — 1126F AMNT PAIN NOTED NONE PRSNT: CPT | Mod: CPTII,S$GLB,, | Performed by: INTERNAL MEDICINE

## 2024-07-29 PROCEDURE — 99214 OFFICE O/P EST MOD 30 MIN: CPT | Mod: S$GLB,,, | Performed by: INTERNAL MEDICINE

## 2024-07-29 PROCEDURE — 3075F SYST BP GE 130 - 139MM HG: CPT | Mod: CPTII,S$GLB,, | Performed by: INTERNAL MEDICINE

## 2024-07-29 PROCEDURE — 3288F FALL RISK ASSESSMENT DOCD: CPT | Mod: CPTII,S$GLB,, | Performed by: INTERNAL MEDICINE

## 2024-07-29 PROCEDURE — 1101F PT FALLS ASSESS-DOCD LE1/YR: CPT | Mod: CPTII,S$GLB,, | Performed by: INTERNAL MEDICINE

## 2024-07-29 PROCEDURE — 1159F MED LIST DOCD IN RCRD: CPT | Mod: CPTII,S$GLB,, | Performed by: INTERNAL MEDICINE

## 2024-07-29 PROCEDURE — 99999 PR PBB SHADOW E&M-EST. PATIENT-LVL IV: CPT | Mod: PBBFAC,,, | Performed by: INTERNAL MEDICINE

## 2024-07-29 RX ORDER — ISOSORBIDE MONONITRATE 60 MG/1
60 TABLET, EXTENDED RELEASE ORAL DAILY
Qty: 90 TABLET | Refills: 3 | Status: SHIPPED | OUTPATIENT
Start: 2024-07-29

## 2024-07-29 RX ORDER — ATENOLOL 25 MG/1
1 TABLET ORAL DAILY
COMMUNITY

## 2024-07-29 NOTE — PROGRESS NOTES
CARDIOVASCULAR CONSULTATION    REASON FOR CONSULT:   Elizabeth Ayala is a 78 y.o. female who presents for   Chief Complaint   Patient presents with    Follow-up          HISTORY OF PRESENT ILLNESS:     Patient is a pleasant 78-year-old lady.  Came in with inferior STEMI.  Underwent PCI of RCA and subsequently PCI of circumflex.  Was not compliant with the dual antiplatelet therapy and did not take her aspirin and presented a week later with inferior STEMI for which she was taken to the cath lab which demonstrated that her RCA stent had thrombosed off.  She underwent revascularization of the RCA again.  This was done via the right radial approach by Dr. Marcum.  Patient states that after that the procedure, she felt some weakness in her right hand and difficulty doing fine movements.  She also had some balance issues which have persisted.  The hand weakness is getting better, although the coordination is not.  Otherwise denies any anginal sounding chest pains, orthopnea, PND    Results for orders placed or performed during the hospital encounter of 07/04/24   EKG 12-lead    Collection Time: 07/04/24  9:33 AM   Result Value Ref Range    QRS Duration 90 ms    [facility] QTC Calculation 403 ms    Narrative    Test Reason : R07.9,    Vent. Rate : 062 BPM     Atrial Rate : 062 BPM     P-R Int : 214 ms          QRS Dur : 090 ms      QT Int : 398 ms       P-R-T Axes : 068 016 090 degrees     QTc Int : 403 ms    Sinus rhythm with 1st degree A-V block  Possible Anterior infarct ,age undetermined  Abnormal ECG  When compared with ECG of 04-APR-2024 06:15,  Significant changes have occurred  Confirmed by Pradeep Coon MD ([id number]) on 7/4/2024 2:38:37 PM    Referred By: [email or web address]   SELF           Confirmed By:Pradeep Coon MD          ECHO    Results for orders placed during the hospital encounter of 04/03/24    Echo    Interpretation Summary    Left Ventricle: Regional wall motion abnormalities present (mild basal inferior  hypokinesis). There is normal systolic function with a visually estimated ejection fraction of 55 - 60%. Grade I diastolic dysfunction.    Right Ventricle: Normal right ventricular cavity size. Systolic function is normal.    Aortic Valve: The aortic valve is a trileaflet valve. There is mild aortic valve sclerosis. There is mild aortic regurgitation.    Pulmonary Artery: The estimated pulmonary artery systolic pressure is 14 mmHg.            CATH    Results for orders placed during the hospital encounter of 04/03/24    Cardiac catheterization    Conclusion  Description of the findings of the procedure: uneventful LHC/cor angio/IVUS guided PCI mid RCA AST (POBA)/R rad vasband.    Findings/Key Components:  LVEDP: 5mmHg  LVEF: echo ordered, prev normal LV fxn    Dominance: Right  LM: normal  LAD: mid diffuse 90% stenosis, small caliber distal vessel  LCx: patent prox-mid stents (placed 3/27/24)  RCA: prox-mid stents thrombotic occlusion (placed 3/25/24), faint antonio to RPDA from LAD.    PCI prox-mid RCA AST:  Preop ASA/Plavix, intra-op heparin  Predil 2.5x20 POBA  IVUS for assessment of stents.  ?underexpansion of distal stent edge.  POBA 2.75x30 NC 20 domingo  Post IVUS with excellent stent expansion and apposition, no dissections  Excellent angiographic result, T3 flow, 0% residual stenosis    Hemostasis:  R Radial band        Successful IVUS guided PCI of prox to mid circ with COLTON x2 with excellent angiographic results.  IVUS post PCI revealed well-opposed and expanded stents.    The Prox Cx lesion was 95% stenosed with 0% stenosis post-intervention.    The Mid Cx lesion was 80% stenosed with 0% stenosis post-intervention.    Prox Cx lesion: A STENT SYNERGY XD [id number] stent was successfully placed at 12 DOMINGO for 15 sec.    Mid Cx lesion: A stent was successfully placed at 12 DOMINGO for 9 sec.    The estimated blood loss was <50 mL.     Coronary angiogram      Left main:  No significant stenosis     Lad:  Mid to distal long  80% stenosis.  Considering the length of this lesion and the diffusely diseased LAD, consider medical management and PCI only if the patient continues to have lifestyle limiting angina despite maximum medical management      Circumflex:  Proximal 95% and mid 80% stenosis.  Successful PCI with COLTON x2      RCA: Previously placed RCA stents are widely patent.        Access:  Right common femoral artery access.  Remove sheath when ACT less than 160        Assessment and plan      Continue aspirin 81 mg daily indefinitely     Plavix 75 mg daily for at least 1 year and longer if no contraindication      Statins and aggressive risk factor modification      There was three vessel coronary artery disease.    The Prox RCA lesion was 99% stenosed with 0% stenosis post-intervention.  This was the culprit vessel for the patient's myocardial infarction    Prox RCA lesion: A STENT SYNERGY XD [id number] stent was successfully placed at 16 ROCÍO for 15 sec.    The estimated blood loss was <50 mL.     Left main:  No significant stenosis     Lad: Mid to distal small diffusely diseased vessel     Circumflex:  Proximal 95%, mid 80% stenosis      RCA:  Culprit vessel:  Proximal heavily calcified subtotal stenosis proximal to previously placed stent.  Successful PCI performed with restoration of DAVID 3 flow and excellent angiographic results.  Resolution of chest pain at end of procedure          Notes from July 24: Patient here for follow-up.  Occasional chest tightness on exertion.  States that the chest tightness did not get better after last PCI of the RCA.  Patient is feels her right hand is slightly weaker than the left hand.  Under the care of Neurology for that.  Continue medical management with aspirin and Plavix        PAST MEDICAL HISTORY:     Past Medical History:   Diagnosis Date    AC (acromioclavicular) joint bone spurs, unspecified laterality     Coronary artery disease     Depression     Hyperlipidemia     Hypertension      Osteoarthritis     ST elevation (STEMI) myocardial infarction of unspecified site     Vaginal delivery     x2       PAST SURGICAL HISTORY:     Past Surgical History:   Procedure Laterality Date    ADENOIDECTOMY      ANGIOGRAM, CORONARY, WITH LEFT HEART CATHETERIZATION N/A 4/3/2024    Procedure: Angiogram, Coronary, with Left Heart Cath;  Surgeon: Cl Rodriguez MD;  Location: [facility] CATH LAB;  Service: Cardiology;  Laterality: N/A;    BREAST SURGERY      biospy    CORONARY STENT PLACEMENT N/A 3/27/2024    Procedure: INSERTION, STENT, CORONARY ARTERY;  Surgeon: Vitaliy Brown MD;  Location: [facility] CATH LAB;  Service: Cardiology;  Laterality: N/A;  PCI circumflex, right common femoral artery access    HYSTERECTOMY      IVUS, CORONARY  3/27/2024    Procedure: IVUS, Coronary;  Surgeon: Vitaliy Brown MD;  Location: [facility] CATH LAB;  Service: Cardiology;;    IVUS, CORONARY  4/3/2024    Procedure: IVUS, Coronary;  Surgeon: Cl Rodriguez MD;  Location: [facility] CATH LAB;  Service: Cardiology;;    JOINT REPLACEMENT Bilateral     knee    LEFT HEART CATHETERIZATION Left 3/25/2024    Procedure: Left heart cath;  Surgeon: Vitaliy Brown MD;  Location: [facility] CATH LAB;  Service: Cardiology;  Laterality: Left;    PERCUTANEOUS TRANSLUMINAL BALLOON ANGIOPLASTY OF CORONARY ARTERY  4/3/2024    Procedure: Angioplasty-coronary;  Surgeon: Cl Rodriguez MD;  Location: [facility] CATH LAB;  Service: Cardiology;;    stent-heart      TONSILLECTOMY      TOTAL KNEE ARTHROPLASTY             SOCIAL HISTORY:     Social History     Socioeconomic History    Marital status:    Tobacco Use    Smoking status: Never    Smokeless tobacco: Never   Substance and Sexual Activity    Alcohol use: No    Drug use: No    Sexual activity: Yes     Partners: Male     Social Determinants of Health     Financial Resource Strain: Medium Risk (7/25/2024)    Overall Financial Resource Strain (CARDIA)     Difficulty of Paying Living Expenses: Somewhat  hard   Food Insecurity: No Food Insecurity (7/25/2024)    Hunger Vital Sign     Worried About Running Out of Food in the Last Year: Never true     Ran Out of Food in the Last Year: Never true   Transportation Needs: No Transportation Needs (4/5/2024)    PRAPARE - Transportation     Lack of Transportation (Medical): No     Lack of Transportation (Non-Medical): No   Recent Concern: Transportation Needs - Unmet Transportation Needs (3/26/2024)    PRAPARE - Transportation     Lack of Transportation (Medical): Yes     Lack of Transportation (Non-Medical): Yes   Physical Activity: Insufficiently Active (7/25/2024)    Exercise Vital Sign     Days of Exercise per Week: 1 day     Minutes of Exercise per Session: 10 min   Stress: Stress Concern Present (7/25/2024)    [employer] - Occupational Stress Questionnaire     Feeling of Stress : Rather much   Housing Stability: Low Risk  (4/5/2024)    Housing Stability Vital Sign     Unable to Pay for Housing in the Last Year: No     Number of Places Lived in the Last Year: 1     Unstable Housing in the Last Year: No   Recent Concern: Housing Stability - High Risk (3/26/2024)    Housing Stability Vital Sign     Unable to Pay for Housing in the Last Year: No     Number of Places Lived in the Last Year: 1     Unstable Housing in the Last Year: Yes       FAMILY HISTORY:     Family History   Problem Relation Name Age of Onset    Heart disease Mother      Heart disease Father         REVIEW OF SYSTEMS:   Review of Systems   Constitutional: Negative.   HENT: Negative.     Eyes: Negative.    Endocrine: Negative.    Hematologic/Lymphatic: Negative.    Skin: Negative.    Musculoskeletal: Negative.    Gastrointestinal: Negative.    Genitourinary: Negative.    Psychiatric/Behavioral: Negative.     Allergic/Immunologic: Negative.        A 10 point review of systems was performed and all the pertinent positives have been mentioned. Rest of review of systems was  "negative.        PHYSICAL EXAM:     Vitals:    07/29/24 0919   BP: 132/72   Pulse: 62    Body mass index is 23.08 kg/m².  Weight: 62.9 kg (138 lb 10.7 oz)   Height: 5' 5" (165.1 cm)     Physical Exam  Constitutional:       Appearance: Normal appearance. She is well-developed.   HENT:      Head: Normocephalic.   Eyes:      Pupils: Pupils are equal, round, and reactive to light.   Cardiovascular:      Rate and Rhythm: Normal rate and regular rhythm.   Pulmonary:      Effort: Pulmonary effort is normal.      Breath sounds: Normal breath sounds.   Abdominal:      General: Bowel sounds are normal.      Palpations: Abdomen is soft.      Tenderness: There is no abdominal tenderness.   Musculoskeletal:         General: Normal range of motion.      Cervical back: Normal range of motion and neck supple.   Skin:     General: Skin is warm.   Neurological:      Mental Status: She is alert and oriented to person, place, and time.           DATA:     Laboratory:  CBC:  Recent Labs   Lab 04/04/24  0423 04/05/24  0419 07/04/24  1024   WBC 7.49 6.78 4.96   Hemoglobin 11.4 L 11.2 L 13.3   Hematocrit 34.8 L 34.0 L 37.3   Platelets 307 290 205       CHEMISTRIES:  Recent Labs   Lab 01/26/23  1413 04/06/23  1021 05/17/23  1258 03/25/24  1109 04/04/24  0423 04/05/24  0419 07/04/24  1024   Glucose  --   --   --    < > 91 93 105   Sodium 136 138 141   < > 141 139 138   Potassium 4.3 3.7 3.9   < > 3.4 L 3.7 3.7   BUN 17.9 16.7 20.1 H   < > 8 8 22   Creatinine 0.77 0.76 0.70   < > 0.6 0.7 0.7   eGFR  78 L 80 L 88 L  --   --   --   --    Calcium 9.6 9.1 8.8   < > 8.0 L 8.3 L 9.2    < > = values in this interval not displayed.       CARDIAC BIOMARKERS:  Recent Labs   Lab 04/03/24 2031 04/03/24  2250 07/04/24  1024   Troponin I 0.161 H 1.996 H <0.006       COAGS:  Recent Labs   Lab 03/26/24  1912 04/03/24 2031 07/04/24  1024   INR 1.0 1.0 1.1       LIPIDS/LFTS:  Recent Labs   Lab 03/26/24  0440 04/03/24 2031 07/04/24  1024 "   AST 14 15 20   ALT 14 10 21       Hemoglobin A1C   Date Value Ref Range Status   12/11/2007 5.5 4.5 - 6.2 % Final     Hemoglobin A1c   Date Value Ref Range Status   03/25/2021 4.9 <5.7 % of total Hgb Final     Comment:     For the purpose of screening for the presence of  diabetes:    <5.7%       Consistent with the absence of diabetes  5.7-6.4%    Consistent with increased risk for diabetes              (prediabetes)  > or =6.5%  Consistent with diabetes    This assay result is consistent with a decreased risk  of diabetes.    Currently, no consensus exists regarding use of  hemoglobin A1c for diagnosis of diabetes in children.    According to American Diabetes Association (ADA)  guidelines, hemoglobin A1c <7.0% represents optimal  control in non-pregnant diabetic patients. Different  metrics may apply to specific patient populations.   Standards of Medical Care in Diabetes(ADA).           TSH        The ASCVD Risk score (Zack VALENCIA, et al., 2019) failed to calculate for the following reasons:    The 2019 ASCVD risk score is only valid for ages 40 to 79    The patient has a prior MI or stroke diagnosis       BNP    Lab Results   Component Value Date/Time    BNP 43 04/03/2024 08:31 PM     (H) 03/25/2024 11:09 AM    BNP 85 04/29/2015 03:31 PM         ASSESSMENT AND PLAN     Patient Active Problem List   Diagnosis    Depression    Essential hypertension, benign    Coronary artery disease involving native coronary artery of native heart with unstable angina pectoris    Hypertension    Mixed hyperlipidemia    Dressler's syndrome    Normocytic anemia         ALLERGIES AND MEDICATION:     Review of patient's allergies indicates:   Allergen Reactions    Effexor [venlafaxine] Hives, Shortness Of Breath and Other (See Comments)     Dry mouth and chest pain    Bactrim [sulfamethoxazole-trimethoprim]     Codeine Itching and Other (See Comments)     Tightness in throat        Medication List            Accurate as of  July 29, 2024 10:17 AM. If you have any questions, ask your nurse or doctor.                CHANGE how you take these medications      isosorbide mononitrate 60 MG 24 hr tablet  Commonly known as: IMDUR  Take 1 tablet (60 mg total) by mouth once daily.  What changed:   medication strength  how much to take  Changed by: Vitaliy Brown MD            CONTINUE taking these medications      amLODIPine 5 MG tablet  Commonly known as: NORVASC  Take 1 tablet (5 mg total) by mouth once daily.     aspirin 81 MG EC tablet  Commonly known as: ECOTRIN     atenoloL 25 MG tablet  Commonly known as: TENORMIN     atorvastatin 80 MG tablet  Commonly known as: LIPITOR     clonazePAM 1 MG tablet  Commonly known as: KlonoPIN  Take 1 tablet (1 mg total) by mouth 2 (two) times daily.     clopidogreL 75 mg tablet  Commonly known as: PLAVIX  Take 1 tablet (75 mg total) by mouth once daily.     colchicine 0.6 mg tablet  Commonly known as: COLCRYS  Take 1 tablet (0.6 mg total) by mouth once daily.     HYDROcodone-acetaminophen 5-325 mg per tablet  Commonly known as: NORCO  Take 1 tablet by mouth every 6 (six) hours as needed for Pain.     metoprolol succinate 50 MG 24 hr tablet  Commonly known as: TOPROL-XL  Take 1 tablet (50 mg total) by mouth once daily.     multivitamin per tablet  Commonly known as: THERAGRAN     nitroGLYCERIN 0.4 MG SL tablet  Commonly known as: NITROSTAT  Place 1 tablet (0.4 mg total) under the tongue every 5 (five) minutes as needed for Chest pain.     traZODone 50 MG tablet  Commonly known as: DESYREL               Where to Get Your Medications        These medications were sent to [facility]/pharmacy #[id number] - [place] - [address]  [address] [address]      Phone: [phone]   isosorbide mononitrate 60 MG 24 hr tablet         Orders Placed This Encounter   Procedures    Nuclear Stress - Cardiology Interpreted       Coronary artery disease status post PCI of RCA and circumflex.  Doing  fine.   Could not afford Brilinta.  On Plavix.  Continue aspirin and Plavix.  Now occasional angina on exertion.  Check stress test.    Aggressive risk factor modification with goal LDL less than 70    Lab Results   Component Value Date    LDLCALC 75.8 06/08/2016     Referral to cardiac rehab has been made    Lab Results   Component Value Date    ALT 21 07/04/2024    AST 20 07/04/2024    ALKPHOS 80 07/04/2024    BILITOT 0.6 07/04/2024         Follow-up in Cardiology Clinic after testing        Thank you very much for involving me in the care of your patient.  Please do not hesitate to contact me if there are any questions.      Vitaliy Brown MD, FACC, [facility]  Interventional Cardiologist, [facility].           This note was dictated with the help of speech recognition software.  There might be un-intended errors and/or substitutions.

## 2024-08-19 ENCOUNTER — HOSPITAL ENCOUNTER (OUTPATIENT)
Dept: CARDIOLOGY | Facility: HOSPITAL | Age: 79
Discharge: HOME | End: 2024-08-19
Attending: INTERNAL MEDICINE
Payer: MEDICARE

## 2024-08-19 ENCOUNTER — HOSPITAL ENCOUNTER (OUTPATIENT)
Dept: RADIOLOGY | Facility: HOSPITAL | Age: 79
Discharge: HOME | End: 2024-08-19
Attending: INTERNAL MEDICINE
Payer: MEDICARE

## 2024-08-19 DIAGNOSIS — I25.10 CORONARY ARTERY DISEASE INVOLVING NATIVE CORONARY ARTERY OF NATIVE HEART, UNSPECIFIED WHETHER ANGINA PRESENT: ICD-10-CM

## 2024-08-19 LAB
CV STRESS BASE HR: 59 BPM
DIASTOLIC BLOOD PRESSURE: 69 MMHG
NUC REST EJECTION FRACTION: 67
OHS CV CPX 85 PERCENT MAX PREDICTED HEART RATE MALE: 119
OHS CV CPX MAX PREDICTED HEART RATE: 140
OHS CV CPX PATIENT IS FEMALE: 1
OHS CV CPX PATIENT IS MALE: 0
OHS CV CPX PEAK DIASTOLIC BLOOD PRESSURE: 63 MMHG
OHS CV CPX PEAK HEAR RATE: 78 BPM
OHS CV CPX PEAK RATE PRESSURE PRODUCT: 9594
OHS CV CPX PEAK SYSTOLIC BLOOD PRESSURE: 123 MMHG
OHS CV CPX PERCENT MAX PREDICTED HEART RATE ACHIEVED: 58
OHS CV CPX RATE PRESSURE PRODUCT PRESENTING: 8319
SYSTOLIC BLOOD PRESSURE: 141 MMHG

## 2024-08-19 PROCEDURE — A9502 TC99M TETROFOSMIN: HCPCS | Performed by: INTERNAL MEDICINE

## 2024-08-19 PROCEDURE — 78452 HT MUSCLE IMAGE SPECT MULT: CPT | Mod: 26,,, | Performed by: INTERNAL MEDICINE

## 2024-08-19 PROCEDURE — 78452 HT MUSCLE IMAGE SPECT MULT: CPT

## 2024-08-19 PROCEDURE — 93017 CV STRESS TEST TRACING ONLY: CPT

## 2024-08-19 PROCEDURE — 93018 CV STRESS TEST I&R ONLY: CPT | Mod: ,,, | Performed by: INTERNAL MEDICINE

## 2024-08-19 PROCEDURE — 63600175 PHARM REV CODE 636 W HCPCS: Performed by: INTERNAL MEDICINE

## 2024-08-19 PROCEDURE — 93016 CV STRESS TEST SUPVJ ONLY: CPT | Mod: ,,, | Performed by: INTERNAL MEDICINE

## 2024-08-19 RX ORDER — REGADENOSON 0.08 MG/ML
0.4 INJECTION, SOLUTION INTRAVENOUS ONCE
Status: COMPLETED | OUTPATIENT
Start: 2024-08-19 | End: 2024-08-19

## 2024-08-19 RX ADMIN — TETROFOSMIN 10.8 MILLICURIE: 1.38 INJECTION, POWDER, LYOPHILIZED, FOR SOLUTION INTRAVENOUS at 07:08

## 2024-08-19 RX ADMIN — REGADENOSON 0.4 MG: 0.08 INJECTION, SOLUTION INTRAVENOUS at 08:08

## 2024-08-19 RX ADMIN — TETROFOSMIN 30.6 MILLICURIE: 1.38 INJECTION, POWDER, LYOPHILIZED, FOR SOLUTION INTRAVENOUS at 08:08

## 2024-08-27 ENCOUNTER — OFFICE VISIT (OUTPATIENT)
Dept: CARDIOLOGY | Facility: CLINIC | Age: 79
End: 2024-08-27
Payer: MEDICARE

## 2024-08-27 VITALS
RESPIRATION RATE: 15 BRPM | WEIGHT: 138.13 LBS | SYSTOLIC BLOOD PRESSURE: 134 MMHG | BODY MASS INDEX: 23.01 KG/M2 | OXYGEN SATURATION: 97 % | DIASTOLIC BLOOD PRESSURE: 66 MMHG | HEIGHT: 65 IN | HEART RATE: 60 BPM

## 2024-08-27 DIAGNOSIS — R94.31 ABNORMAL EKG: ICD-10-CM

## 2024-08-27 DIAGNOSIS — I25.10 CORONARY ARTERY DISEASE INVOLVING NATIVE CORONARY ARTERY OF NATIVE HEART, UNSPECIFIED WHETHER ANGINA PRESENT: Primary | ICD-10-CM

## 2024-08-27 DIAGNOSIS — R53.1 WEAKNESS: ICD-10-CM

## 2024-08-27 DIAGNOSIS — I70.0 AORTIC ATHEROSCLEROSIS: ICD-10-CM

## 2024-08-27 PROCEDURE — 99214 OFFICE O/P EST MOD 30 MIN: CPT | Mod: S$GLB,,, | Performed by: INTERNAL MEDICINE

## 2024-08-27 PROCEDURE — 1126F AMNT PAIN NOTED NONE PRSNT: CPT | Mod: CPTII,S$GLB,, | Performed by: INTERNAL MEDICINE

## 2024-08-27 PROCEDURE — 3078F DIAST BP <80 MM HG: CPT | Mod: CPTII,S$GLB,, | Performed by: INTERNAL MEDICINE

## 2024-08-27 PROCEDURE — 3288F FALL RISK ASSESSMENT DOCD: CPT | Mod: CPTII,S$GLB,, | Performed by: INTERNAL MEDICINE

## 2024-08-27 PROCEDURE — 1101F PT FALLS ASSESS-DOCD LE1/YR: CPT | Mod: CPTII,S$GLB,, | Performed by: INTERNAL MEDICINE

## 2024-08-27 PROCEDURE — 3075F SYST BP GE 130 - 139MM HG: CPT | Mod: CPTII,S$GLB,, | Performed by: INTERNAL MEDICINE

## 2024-08-27 PROCEDURE — 1159F MED LIST DOCD IN RCRD: CPT | Mod: CPTII,S$GLB,, | Performed by: INTERNAL MEDICINE

## 2024-08-27 PROCEDURE — 99999 PR PBB SHADOW E&M-EST. PATIENT-LVL IV: CPT | Mod: PBBFAC,,, | Performed by: INTERNAL MEDICINE

## 2024-08-27 NOTE — PROGRESS NOTES
CARDIOVASCULAR CONSULTATION    REASON FOR CONSULT:   Elizabeth Ayala is a 78 y.o. female who presents for   Chief Complaint   Patient presents with    Results          HISTORY OF PRESENT ILLNESS:     Patient is a pleasant 78-year-old lady.  Came in with inferior STEMI.  Underwent PCI of RCA and subsequently PCI of circumflex.  Was not compliant with the dual antiplatelet therapy and did not take her aspirin and presented a week later with inferior STEMI for which she was taken to the cath lab which demonstrated that her RCA stent had thrombosed off.  She underwent revascularization of the RCA again.  This was done via the right radial approach by Dr. Marcum.  Patient states that after that the procedure, she felt some weakness in her right hand and difficulty doing fine movements.  She also had some balance issues which have persisted.  The hand weakness is getting better, although the coordination is not.  Otherwise denies any anginal sounding chest pains, orthopnea, PND    Results for orders placed or performed during the hospital encounter of 07/04/24   EKG 12-lead    Collection Time: 07/04/24  9:33 AM   Result Value Ref Range    QRS Duration 90 ms    [facility] QTC Calculation 403 ms    Narrative    Test Reason : R07.9,    Vent. Rate : 062 BPM     Atrial Rate : 062 BPM     P-R Int : 214 ms          QRS Dur : 090 ms      QT Int : 398 ms       P-R-T Axes : 068 016 090 degrees     QTc Int : 403 ms    Sinus rhythm with 1st degree A-V block  Possible Anterior infarct ,age undetermined  Abnormal ECG  When compared with ECG of 04-APR-2024 06:15,  Significant changes have occurred  Confirmed by Pradeep Coon MD ([id number]) on 7/4/2024 2:38:37 PM    Referred By: SAMMY   SELF           Confirmed By:Pradeep Coon MD          ECHO    Results for orders placed during the hospital encounter of 04/03/24    Echo    Interpretation Summary    Left Ventricle: Regional wall motion abnormalities present (mild basal inferior  hypokinesis). There is normal systolic function with a visually estimated ejection fraction of 55 - 60%. Grade I diastolic dysfunction.    Right Ventricle: Normal right ventricular cavity size. Systolic function is normal.    Aortic Valve: The aortic valve is a trileaflet valve. There is mild aortic valve sclerosis. There is mild aortic regurgitation.    Pulmonary Artery: The estimated pulmonary artery systolic pressure is 14 mmHg.            CATH    Results for orders placed during the hospital encounter of 04/03/24    Cardiac catheterization    Conclusion  Description of the findings of the procedure: uneventful LHC/cor angio/IVUS guided PCI mid RCA AST (POBA)/R rad vasband.    Findings/Key Components:  LVEDP: 5mmHg  LVEF: echo ordered, prev normal LV fxn    Dominance: Right  LM: normal  LAD: mid diffuse 90% stenosis, small caliber distal vessel  LCx: patent prox-mid stents (placed 3/27/24)  RCA: prox-mid stents thrombotic occlusion (placed 3/25/24), faint antonio to RPDA from LAD.    PCI prox-mid RCA AST:  Preop ASA/Plavix, intra-op heparin  Predil 2.5x20 POBA  IVUS for assessment of stents.  ?underexpansion of distal stent edge.  POBA 2.75x30 NC 20 domingo  Post IVUS with excellent stent expansion and apposition, no dissections  Excellent angiographic result, T3 flow, 0% residual stenosis    Hemostasis:  R Radial band        Successful IVUS guided PCI of prox to mid circ with COLTON x2 with excellent angiographic results.  IVUS post PCI revealed well-opposed and expanded stents.    The Prox Cx lesion was 95% stenosed with 0% stenosis post-intervention.    The Mid Cx lesion was 80% stenosed with 0% stenosis post-intervention.    Prox Cx lesion: A STENT SYNERGY XD [id number] stent was successfully placed at 12 DOMINGO for 15 sec.    Mid Cx lesion: A stent was successfully placed at 12 DOMINGO for 9 sec.    The estimated blood loss was <50 mL.     Coronary angiogram      Left main:  No significant stenosis     Lad:  Mid to distal long  80% stenosis.  Considering the length of this lesion and the diffusely diseased LAD, consider medical management and PCI only if the patient continues to have lifestyle limiting angina despite maximum medical management      Circumflex:  Proximal 95% and mid 80% stenosis.  Successful PCI with COLTON x2      RCA: Previously placed RCA stents are widely patent.        Access:  Right common femoral artery access.  Remove sheath when ACT less than 160        Assessment and plan      Continue aspirin 81 mg daily indefinitely     Plavix 75 mg daily for at least 1 year and longer if no contraindication      Statins and aggressive risk factor modification      There was three vessel coronary artery disease.    The Prox RCA lesion was 99% stenosed with 0% stenosis post-intervention.  This was the culprit vessel for the patient's myocardial infarction    Prox RCA lesion: A STENT SYNERGY XD [id number] stent was successfully placed at 16 ROCÍO for 15 sec.    The estimated blood loss was <50 mL.     Left main:  No significant stenosis     Lad: Mid to distal small diffusely diseased vessel     Circumflex:  Proximal 95%, mid 80% stenosis      RCA:  Culprit vessel:  Proximal heavily calcified subtotal stenosis proximal to previously placed stent.  Successful PCI performed with restoration of DAVID 3 flow and excellent angiographic results.  Resolution of chest pain at end of procedure          Notes from July 24: Patient here for follow-up.  Occasional chest tightness on exertion.  States that the chest tightness did not get better after last PCI of the RCA.  Patient is feels her right hand is slightly weaker than the left hand.  Under the care of Neurology for that.  Continue medical management with aspirin and Plavix    Notes from August 24: Patient here for follow-up.  Doing fine.  Denies chest pains at rest on exertion, orthopnea, PND    Results for orders placed during the hospital encounter of 08/19/24    Nuclear Stress - Cardiology  Interpreted    Interpretation Summary    Normal myocardial perfusion scan. There is no evidence of myocardial ischemia or infarction.    The gated perfusion images showed an ejection fraction of 67% at rest.    There is normal wall motion at rest and post-stress.    The ECG portion of the study is negative for ischemia.    The patient reported chest pain during the stress test.    During stress, occasional PVCs are noted.      PAST MEDICAL HISTORY:     Past Medical History:   Diagnosis Date    AC (acromioclavicular) joint bone spurs, unspecified laterality     Coronary artery disease     Depression     Hyperlipidemia     Hypertension     Osteoarthritis     ST elevation (STEMI) myocardial infarction of unspecified site     Vaginal delivery     x2       PAST SURGICAL HISTORY:     Past Surgical History:   Procedure Laterality Date    ADENOIDECTOMY      ANGIOGRAM, CORONARY, WITH LEFT HEART CATHETERIZATION N/A 4/3/2024    Procedure: Angiogram, Coronary, with Left Heart Cath;  Surgeon: Cl Rodriguez MD;  Location: [facility] CATH LAB;  Service: Cardiology;  Laterality: N/A;    BREAST SURGERY      biospy    CORONARY STENT PLACEMENT N/A 3/27/2024    Procedure: INSERTION, STENT, CORONARY ARTERY;  Surgeon: Vitaliy Brown MD;  Location: [facility] CATH LAB;  Service: Cardiology;  Laterality: N/A;  PCI circumflex, right common femoral artery access    HYSTERECTOMY      IVUS, CORONARY  3/27/2024    Procedure: IVUS, Coronary;  Surgeon: Vitaliy Brown MD;  Location: [facility] CATH LAB;  Service: Cardiology;;    IVUS, CORONARY  4/3/2024    Procedure: IVUS, Coronary;  Surgeon: Cl Rodriguez MD;  Location: [facility] CATH LAB;  Service: Cardiology;;    JOINT REPLACEMENT Bilateral     knee    LEFT HEART CATHETERIZATION Left 3/25/2024    Procedure: Left heart cath;  Surgeon: Vitaliy Brown MD;  Location: [facility] CATH LAB;  Service: Cardiology;  Laterality: Left;    PERCUTANEOUS TRANSLUMINAL BALLOON ANGIOPLASTY OF CORONARY ARTERY  4/3/2024     Procedure: Angioplasty-coronary;  Surgeon: Cl Rodriguez MD;  Location: [facility] CATH LAB;  Service: Cardiology;;    stent-heart      TONSILLECTOMY      TOTAL KNEE ARTHROPLASTY             SOCIAL HISTORY:     Social History     Socioeconomic History    Marital status:    Tobacco Use    Smoking status: Never    Smokeless tobacco: Never   Substance and Sexual Activity    Alcohol use: No    Drug use: No    Sexual activity: Yes     Partners: Male     Social Determinants of Health     Financial Resource Strain: Medium Risk (7/25/2024)    Overall Financial Resource Strain (CARDIA)     Difficulty of Paying Living Expenses: Somewhat hard   Food Insecurity: No Food Insecurity (7/25/2024)    Hunger Vital Sign     Worried About Running Out of Food in the Last Year: Never true     Ran Out of Food in the Last Year: Never true   Transportation Needs: No Transportation Needs (4/5/2024)    PRAPARE - Transportation     Lack of Transportation (Medical): No     Lack of Transportation (Non-Medical): No   Recent Concern: Transportation Needs - Unmet Transportation Needs (3/26/2024)    PRAPARE - Transportation     Lack of Transportation (Medical): Yes     Lack of Transportation (Non-Medical): Yes   Physical Activity: Insufficiently Active (7/25/2024)    Exercise Vital Sign     Days of Exercise per Week: 1 day     Minutes of Exercise per Session: 10 min   Stress: Stress Concern Present (7/25/2024)    [employer] - Occupational Stress Questionnaire     Feeling of Stress : Rather much   Housing Stability: Low Risk  (4/5/2024)    Housing Stability Vital Sign     Unable to Pay for Housing in the Last Year: No     Number of Places Lived in the Last Year: 1     Unstable Housing in the Last Year: No   Recent Concern: Housing Stability - High Risk (3/26/2024)    Housing Stability Vital Sign     Unable to Pay for Housing in the Last Year: No     Number of Places Lived in the Last Year: 1     Unstable Housing  "in the Last Year: Yes       FAMILY HISTORY:     Family History   Problem Relation Name Age of Onset    Heart disease Mother      Heart disease Father         REVIEW OF SYSTEMS:   Review of Systems   Constitutional: Negative.   HENT: Negative.     Eyes: Negative.    Endocrine: Negative.    Hematologic/Lymphatic: Negative.    Skin: Negative.    Musculoskeletal: Negative.    Gastrointestinal: Negative.    Genitourinary: Negative.    Psychiatric/Behavioral: Negative.     Allergic/Immunologic: Negative.        A 10 point review of systems was performed and all the pertinent positives have been mentioned. Rest of review of systems was negative.        PHYSICAL EXAM:     Vitals:    08/27/24 1056   BP: 134/66   Pulse: 60   Resp: 15    Body mass index is 22.98 kg/m².  Weight: 62.6 kg (138 lb 1.9 oz)   Height: 5' 5" (165.1 cm)     Physical Exam  Constitutional:       Appearance: Normal appearance. She is well-developed.   HENT:      Head: Normocephalic.   Eyes:      Pupils: Pupils are equal, round, and reactive to light.   Cardiovascular:      Rate and Rhythm: Normal rate and regular rhythm.   Pulmonary:      Effort: Pulmonary effort is normal.      Breath sounds: Normal breath sounds.   Abdominal:      General: Bowel sounds are normal.      Palpations: Abdomen is soft.      Tenderness: There is no abdominal tenderness.   Musculoskeletal:         General: Normal range of motion.      Cervical back: Normal range of motion and neck supple.   Skin:     General: Skin is warm.   Neurological:      Mental Status: She is alert and oriented to person, place, and time.           DATA:     Laboratory:  CBC:  Recent Labs   Lab 04/04/24  0423 04/05/24  0419 07/04/24  1024   WBC 7.49 6.78 4.96   Hemoglobin 11.4 L 11.2 L 13.3   Hematocrit 34.8 L 34.0 L 37.3   Platelets 307 290 205       CHEMISTRIES:  Recent Labs   Lab 01/26/23  1413 04/06/23  1021 05/17/23  1258 03/25/24  1109 04/04/24  0423 04/05/24  0419 07/04/24  1024   Glucose  --   -- "   --    < > 91 93 105   Sodium 136 138 141   < > 141 139 138   Potassium 4.3 3.7 3.9   < > 3.4 L 3.7 3.7   BUN 17.9 16.7 20.1 H   < > 8 8 22   Creatinine 0.77 0.76 0.70   < > 0.6 0.7 0.7   eGFR  78 L 80 L 88 L  --   --   --   --    Calcium 9.6 9.1 8.8   < > 8.0 L 8.3 L 9.2    < > = values in this interval not displayed.       CARDIAC BIOMARKERS:  Recent Labs   Lab 04/03/24 2031 04/03/24 2250 07/04/24  1024   Troponin I 0.161 H 1.996 H <0.006       COAGS:  Recent Labs   Lab 03/26/24  1912 04/03/24 2031 07/04/24  1024   INR 1.0 1.0 1.1       LIPIDS/LFTS:  Recent Labs   Lab 03/26/24 0440 04/03/24 2031 07/04/24  1024   AST 14 15 20   ALT 14 10 21       Hemoglobin A1C   Date Value Ref Range Status   12/11/2007 5.5 4.5 - 6.2 % Final     Hemoglobin A1c   Date Value Ref Range Status   03/25/2021 4.9 <5.7 % of total Hgb Final     Comment:     For the purpose of screening for the presence of  diabetes:    <5.7%       Consistent with the absence of diabetes  5.7-6.4%    Consistent with increased risk for diabetes              (prediabetes)  > or =6.5%  Consistent with diabetes    This assay result is consistent with a decreased risk  of diabetes.    Currently, no consensus exists regarding use of  hemoglobin A1c for diagnosis of diabetes in children.    According to American Diabetes Association (ADA)  guidelines, hemoglobin A1c <7.0% represents optimal  control in non-pregnant diabetic patients. Different  metrics may apply to specific patient populations.   Standards of Medical Care in Diabetes(ADA).           TSH        The ASCVD Risk score (Zack VALENCIA, et al., 2019) failed to calculate for the following reasons:    The 2019 ASCVD risk score is only valid for ages 40 to 79    The patient has a prior MI or stroke diagnosis       BNP    Lab Results   Component Value Date/Time    BNP 43 04/03/2024 08:31 PM     (H) 03/25/2024 11:09 AM    BNP 85 04/29/2015 03:31 PM     Lab Results   Component Value  Date    LDLCALC 75.8 06/08/2016        ASSESSMENT AND PLAN     Patient Active Problem List   Diagnosis    Depression    Essential hypertension, benign    Coronary artery disease involving native coronary artery of native heart with unstable angina pectoris    Hypertension    Mixed hyperlipidemia    Dressler's syndrome    Normocytic anemia         ALLERGIES AND MEDICATION:     Review of patient's allergies indicates:   Allergen Reactions    Effexor [venlafaxine] Hives, Shortness Of Breath and Other (See Comments)     Dry mouth and chest pain    Bactrim [sulfamethoxazole-trimethoprim]     Codeine Itching and Other (See Comments)     Tightness in throat        Medication List            Accurate as of August 27, 2024 11:23 AM. If you have any questions, ask your nurse or doctor.                CONTINUE taking these medications      amLODIPine 5 MG tablet  Commonly known as: NORVASC  Take 1 tablet (5 mg total) by mouth once daily.     aspirin 81 MG EC tablet  Commonly known as: ECOTRIN     atenoloL 25 MG tablet  Commonly known as: TENORMIN     atorvastatin 80 MG tablet  Commonly known as: LIPITOR     clonazePAM 1 MG tablet  Commonly known as: KlonoPIN  Take 1 tablet (1 mg total) by mouth 2 (two) times daily.     clopidogreL 75 mg tablet  Commonly known as: PLAVIX  Take 1 tablet (75 mg total) by mouth once daily.     colchicine 0.6 mg tablet  Commonly known as: COLCRYS  Take 1 tablet (0.6 mg total) by mouth once daily.     HYDROcodone-acetaminophen 5-325 mg per tablet  Commonly known as: NORCO  Take 1 tablet by mouth every 6 (six) hours as needed for Pain.     isosorbide mononitrate 60 MG 24 hr tablet  Commonly known as: IMDUR  Take 1 tablet (60 mg total) by mouth once daily.     metoprolol succinate 50 MG 24 hr tablet  Commonly known as: TOPROL-XL  Take 1 tablet (50 mg total) by mouth once daily.     multivitamin per tablet  Commonly known as: THERAGRAN     nitroGLYCERIN 0.4 MG SL tablet  Commonly known as:  NITROSTAT  Place 1 tablet (0.4 mg total) under the tongue every 5 (five) minutes as needed for Chest pain.     traZODone 50 MG tablet  Commonly known as: DESYREL              No orders of the defined types were placed in this encounter.      Coronary artery disease status post PCI of RCA and circumflex.  Doing fine.   Could not afford Brilinta.  On Plavix.  Continue aspirin and Plavix.  No more chest pains recent stress test did not show any significant ischemia      Aggressive risk factor modification with goal LDL less than 70    Lab Results   Component Value Date    LDLCALC 75.8 06/08/2016     Referral to cardiac rehab has been made    Lab Results   Component Value Date    ALT 21 07/04/2024    AST 20 07/04/2024    ALKPHOS 80 07/04/2024    BILITOT 0.6 07/04/2024         Follow-up in Cardiology Clinic after 3 m        Thank you very much for involving me in the care of your patient.  Please do not hesitate to contact me if there are any questions.      Vitaliy Brown MD, FACC, [facility]  Interventional Cardiologist, [facility].           This note was dictated with the help of speech recognition software.  There might be un-intended errors and/or substitutions.

## 2025-01-15 ENCOUNTER — TELEPHONE (OUTPATIENT)
Dept: CARDIOLOGY | Facility: CLINIC | Age: 80
End: 2025-01-15
Payer: MEDICARE

## 2025-01-15 NOTE — TELEPHONE ENCOUNTER
----- Message from [employer] sent at 1/15/2025  4:37 PM CST -----  Type : Patient Call      Who Called : Patient      Does the patient know what this is regarding?: Patient is wanting to reschedule upcoming appt scheduled on 1/21; pt says it's due to the weather; attempted to reschedule pt, no availability.         Would the patient rather a call back or a response via [facility]? Call        Best Call Back Number: [phone]        Additional Information:

## 2025-01-26 RX ORDER — COLCHICINE 0.6 MG/1
0.6 TABLET ORAL
Qty: 90 TABLET | Refills: 3 | Status: SHIPPED | OUTPATIENT
Start: 2025-01-26

## 2025-02-04 ENCOUNTER — OFFICE VISIT (OUTPATIENT)
Dept: CARDIOLOGY | Facility: CLINIC | Age: 80
End: 2025-02-04
Payer: MEDICARE

## 2025-02-04 VITALS
BODY MASS INDEX: 26.79 KG/M2 | HEART RATE: 59 BPM | RESPIRATION RATE: 15 BRPM | SYSTOLIC BLOOD PRESSURE: 146 MMHG | DIASTOLIC BLOOD PRESSURE: 72 MMHG | OXYGEN SATURATION: 97 % | HEIGHT: 60 IN | WEIGHT: 136.44 LBS

## 2025-02-04 DIAGNOSIS — I50.32 CHRONIC DIASTOLIC HEART FAILURE: ICD-10-CM

## 2025-02-04 DIAGNOSIS — I70.0 AORTIC ATHEROSCLEROSIS: ICD-10-CM

## 2025-02-04 DIAGNOSIS — I25.10 CORONARY ARTERY DISEASE INVOLVING NATIVE CORONARY ARTERY OF NATIVE HEART, UNSPECIFIED WHETHER ANGINA PRESENT: Primary | ICD-10-CM

## 2025-02-04 DIAGNOSIS — I25.10 CORONARY ARTERY DISEASE, UNSPECIFIED VESSEL OR LESION TYPE, UNSPECIFIED WHETHER ANGINA PRESENT, UNSPECIFIED WHETHER NATIVE OR TRANSPLANTED HEART: ICD-10-CM

## 2025-02-04 DIAGNOSIS — I10 ESSENTIAL HYPERTENSION, BENIGN: ICD-10-CM

## 2025-02-04 PROCEDURE — 93000 ELECTROCARDIOGRAM COMPLETE: CPT | Mod: S$GLB,,, | Performed by: INTERNAL MEDICINE

## 2025-02-04 PROCEDURE — 1126F AMNT PAIN NOTED NONE PRSNT: CPT | Mod: CPTII,S$GLB,, | Performed by: INTERNAL MEDICINE

## 2025-02-04 PROCEDURE — 1159F MED LIST DOCD IN RCRD: CPT | Mod: CPTII,S$GLB,, | Performed by: INTERNAL MEDICINE

## 2025-02-04 PROCEDURE — 99214 OFFICE O/P EST MOD 30 MIN: CPT | Mod: S$GLB,,, | Performed by: INTERNAL MEDICINE

## 2025-02-04 PROCEDURE — 1101F PT FALLS ASSESS-DOCD LE1/YR: CPT | Mod: CPTII,S$GLB,, | Performed by: INTERNAL MEDICINE

## 2025-02-04 PROCEDURE — 3078F DIAST BP <80 MM HG: CPT | Mod: CPTII,S$GLB,, | Performed by: INTERNAL MEDICINE

## 2025-02-04 PROCEDURE — 3288F FALL RISK ASSESSMENT DOCD: CPT | Mod: CPTII,S$GLB,, | Performed by: INTERNAL MEDICINE

## 2025-02-04 PROCEDURE — 99999 PR PBB SHADOW E&M-EST. PATIENT-LVL IV: CPT | Mod: PBBFAC,,, | Performed by: INTERNAL MEDICINE

## 2025-02-04 PROCEDURE — 3077F SYST BP >= 140 MM HG: CPT | Mod: CPTII,S$GLB,, | Performed by: INTERNAL MEDICINE

## 2025-02-04 PROCEDURE — G2211 COMPLEX E/M VISIT ADD ON: HCPCS | Mod: S$GLB,,, | Performed by: INTERNAL MEDICINE

## 2025-02-04 NOTE — PROGRESS NOTES
CARDIOVASCULAR CONSULTATION    REASON FOR CONSULT:   Elizabeth Ayala is a 79 y.o. female who presents for   No chief complaint on file.         HISTORY OF PRESENT ILLNESS:     Patient is a pleasant 78-year-old lady.  Came in with inferior STEMI.  Underwent PCI of RCA and subsequently PCI of circumflex.  Was not compliant with the dual antiplatelet therapy and did not take her aspirin and presented a week later with inferior STEMI for which she was taken to the cath lab which demonstrated that her RCA stent had thrombosed off.  She underwent revascularization of the RCA again.  This was done via the right radial approach by Dr. Marcum.  Patient states that after that the procedure, she felt some weakness in her right hand and difficulty doing fine movements.  She also had some balance issues which have persisted.  The hand weakness is getting better, although the coordination is not.  Otherwise denies any anginal sounding chest pains, orthopnea, PND    Results for orders placed or performed during the hospital encounter of 07/04/24   EKG 12-lead    Collection Time: 07/04/24  9:33 AM   Result Value Ref Range    QRS Duration 90 ms    [facility] QTC Calculation 403 ms    Narrative    Test Reason : R07.9,    Vent. Rate : 062 BPM     Atrial Rate : 062 BPM     P-R Int : 214 ms          QRS Dur : 090 ms      QT Int : 398 ms       P-R-T Axes : 068 016 090 degrees     QTc Int : 403 ms    Sinus rhythm with 1st degree A-V block  Possible Anterior infarct ,age undetermined  Abnormal ECG  When compared with ECG of 04-APR-2024 06:15,  Significant changes have occurred  Confirmed by Pradeep Coon MD ([id number]) on 7/4/2024 2:38:37 PM    Referred By: SAMMY   SELF           Confirmed By:Pradeep Coon MD          ECHO    Results for orders placed during the hospital encounter of 04/03/24    Echo    Interpretation Summary    Left Ventricle: Regional wall motion abnormalities present (mild basal inferior hypokinesis). There is normal  systolic function with a visually estimated ejection fraction of 55 - 60%. Grade I diastolic dysfunction.    Right Ventricle: Normal right ventricular cavity size. Systolic function is normal.    Aortic Valve: The aortic valve is a trileaflet valve. There is mild aortic valve sclerosis. There is mild aortic regurgitation.    Pulmonary Artery: The estimated pulmonary artery systolic pressure is 14 mmHg.            CATH    Results for orders placed during the hospital encounter of 04/03/24    Cardiac catheterization    Conclusion  Description of the findings of the procedure: uneventful LHC/cor angio/IVUS guided PCI mid RCA AST (POBA)/R rad vasband.    Findings/Key Components:  LVEDP: 5mmHg  LVEF: echo ordered, prev normal LV fxn    Dominance: Right  LM: normal  LAD: mid diffuse 90% stenosis, small caliber distal vessel  LCx: patent prox-mid stents (placed 3/27/24)  RCA: prox-mid stents thrombotic occlusion (placed 3/25/24), faint antonio to RPDA from LAD.    PCI prox-mid RCA AST:  Preop ASA/Plavix, intra-op heparin  Predil 2.5x20 POBA  IVUS for assessment of stents.  ?underexpansion of distal stent edge.  POBA 2.75x30 NC 20 domingo  Post IVUS with excellent stent expansion and apposition, no dissections  Excellent angiographic result, T3 flow, 0% residual stenosis    Hemostasis:  R Radial band        Successful IVUS guided PCI of prox to mid circ with COLTON x2 with excellent angiographic results.  IVUS post PCI revealed well-opposed and expanded stents.    The Prox Cx lesion was 95% stenosed with 0% stenosis post-intervention.    The Mid Cx lesion was 80% stenosed with 0% stenosis post-intervention.    Prox Cx lesion: A STENT SYNERGY XD [id number] stent was successfully placed at 12 DOMINGO for 15 sec.    Mid Cx lesion: A stent was successfully placed at 12 DOMINGO for 9 sec.    The estimated blood loss was <50 mL.     Coronary angiogram      Left main:  No significant stenosis     Lad:  Mid to distal long 80% stenosis.  Considering  the length of this lesion and the diffusely diseased LAD, consider medical management and PCI only if the patient continues to have lifestyle limiting angina despite maximum medical management      Circumflex:  Proximal 95% and mid 80% stenosis.  Successful PCI with COLTON x2      RCA: Previously placed RCA stents are widely patent.        Access:  Right common femoral artery access.  Remove sheath when ACT less than 160        Assessment and plan      Continue aspirin 81 mg daily indefinitely     Plavix 75 mg daily for at least 1 year and longer if no contraindication      Statins and aggressive risk factor modification      There was three vessel coronary artery disease.    The Prox RCA lesion was 99% stenosed with 0% stenosis post-intervention.  This was the culprit vessel for the patient's myocardial infarction    Prox RCA lesion: A STENT SYNERGY XD [id number] stent was successfully placed at 16 ROCÍO for 15 sec.    The estimated blood loss was <50 mL.     Left main:  No significant stenosis     Lad: Mid to distal small diffusely diseased vessel     Circumflex:  Proximal 95%, mid 80% stenosis      RCA:  Culprit vessel:  Proximal heavily calcified subtotal stenosis proximal to previously placed stent.  Successful PCI performed with restoration of DAVID 3 flow and excellent angiographic results.  Resolution of chest pain at end of procedure          Notes from July 24: Patient here for follow-up.  Occasional chest tightness on exertion.  States that the chest tightness did not get better after last PCI of the RCA.  Patient is feels her right hand is slightly weaker than the left hand.  Under the care of Neurology for that.  Continue medical management with aspirin and Plavix    Notes from August 24: Patient here for follow-up.  Doing fine.  Denies chest pains at rest on exertion, orthopnea, PND    Results for orders placed during the hospital encounter of 08/19/24    Nuclear Stress - Cardiology  Interpreted    Interpretation Summary    Normal myocardial perfusion scan. There is no evidence of myocardial ischemia or infarction.    The gated perfusion images showed an ejection fraction of 67% at rest.    There is normal wall motion at rest and post-stress.    The ECG portion of the study is negative for ischemia.    The patient reported chest pain during the stress test.    During stress, occasional PVCs are noted.    February 25    History of Present Illness    CHIEF COMPLAINT:  Elizabeth presents today for cardiac follow-up.    CARDIOVASCULAR HISTORY:  She has a history of stent placement in the right coronary artery (RCA) and circumflex artery. A stress test from August last year was normal, showing no evidence of blockages. Her blood pressure is well-controlled at home, though slightly elevated in clinic settings. She denies chest pain, tightness, shortness of breath, palpitations, or sensation of heart beating fast or slow. She denies any swelling of feet and has not required recent use of nitroglycerin.    WEIGHT:  She reports unintentional weight loss of over 50 lbs. She maintains her usual food choices but consumes smaller portions, noting decreased appetite.    CURRENT MEDICATIONS:  She continues amlodipine, aspirin, atenolol, atorvastatin, plavix, Imdur, metoprolol, and nitroglycerin as needed.           PAST MEDICAL HISTORY:     Past Medical History:   Diagnosis Date    AC (acromioclavicular) joint bone spurs, unspecified laterality     Coronary artery disease     Depression     Hyperlipidemia     Hypertension     Osteoarthritis     ST elevation (STEMI) myocardial infarction of unspecified site     Vaginal delivery     x2       PAST SURGICAL HISTORY:     Past Surgical History:   Procedure Laterality Date    ADENOIDECTOMY      ANGIOGRAM, CORONARY, WITH LEFT HEART CATHETERIZATION N/A 4/3/2024    Procedure: Angiogram, Coronary, with Left Heart Cath;  Surgeon: Cl Rodriguez MD;  Location: [facility] CATH  LAB;  Service: Cardiology;  Laterality: N/A;    BREAST SURGERY      biospy    CORONARY STENT PLACEMENT N/A 3/27/2024    Procedure: INSERTION, STENT, CORONARY ARTERY;  Surgeon: Vitaliy Brown MD;  Location: [facility] CATH LAB;  Service: Cardiology;  Laterality: N/A;  PCI circumflex, right common femoral artery access    HYSTERECTOMY      IVUS, CORONARY  3/27/2024    Procedure: IVUS, Coronary;  Surgeon: Vitaliy Brown MD;  Location: [facility] CATH LAB;  Service: Cardiology;;    IVUS, CORONARY  4/3/2024    Procedure: IVUS, Coronary;  Surgeon: Cl Rodriguez MD;  Location: [facility] CATH LAB;  Service: Cardiology;;    JOINT REPLACEMENT Bilateral     knee    LEFT HEART CATHETERIZATION Left 3/25/2024    Procedure: Left heart cath;  Surgeon: Vitaliy Brown MD;  Location: [facility] CATH LAB;  Service: Cardiology;  Laterality: Left;    PERCUTANEOUS TRANSLUMINAL BALLOON ANGIOPLASTY OF CORONARY ARTERY  4/3/2024    Procedure: Angioplasty-coronary;  Surgeon: Cl Rodriguez MD;  Location: [facility] CATH LAB;  Service: Cardiology;;    stent-heart      TONSILLECTOMY      TOTAL KNEE ARTHROPLASTY             SOCIAL HISTORY:     Social History     Socioeconomic History    Marital status:    Tobacco Use    Smoking status: Never    Smokeless tobacco: Never   Substance and Sexual Activity    Alcohol use: No    Drug use: No    Sexual activity: Yes     Partners: Male     Social Drivers of Health     Financial Resource Strain: Medium Risk (7/25/2024)    Overall Financial Resource Strain (CARDIA)     Difficulty of Paying Living Expenses: Somewhat hard   Food Insecurity: No Food Insecurity (7/25/2024)    Hunger Vital Sign     Worried About Running Out of Food in the Last Year: Never true     Ran Out of Food in the Last Year: Never true   Transportation Needs: No Transportation Needs (4/5/2024)    PRAPARE - Transportation     Lack of Transportation (Medical): No     Lack of Transportation (Non-Medical): No   Recent Concern: Transportation  Needs - Unmet Transportation Needs (3/26/2024)    PRAPARE - Transportation     Lack of Transportation (Medical): Yes     Lack of Transportation (Non-Medical): Yes   Physical Activity: Insufficiently Active (7/25/2024)    Exercise Vital Sign     Days of Exercise per Week: 1 day     Minutes of Exercise per Session: 10 min   Stress: Stress Concern Present (7/25/2024)    [facility] [place] of Occupational Health - Occupational Stress Questionnaire     Feeling of Stress : Rather much   Housing Stability: Low Risk  (4/5/2024)    Housing Stability Vital Sign     Unable to Pay for Housing in the Last Year: No     Number of Places Lived in the Last Year: 1     Unstable Housing in the Last Year: No   Recent Concern: Housing Stability - High Risk (3/26/2024)    Housing Stability Vital Sign     Unable to Pay for Housing in the Last Year: No     Number of Places Lived in the Last Year: 1     Unstable Housing in the Last Year: Yes       FAMILY HISTORY:     Family History   Problem Relation Name Age of Onset    Heart disease Mother      Heart disease Father         REVIEW OF SYSTEMS:   Review of Systems   Constitutional: Negative.   HENT: Negative.     Eyes: Negative.    Endocrine: Negative.    Hematologic/Lymphatic: Negative.    Skin: Negative.    Musculoskeletal: Negative.    Gastrointestinal: Negative.    Genitourinary: Negative.    Psychiatric/Behavioral: Negative.     Allergic/Immunologic: Negative.        A 10 point review of systems was performed and all the pertinent positives have been mentioned. Rest of review of systems was negative.        PHYSICAL EXAM:     Vitals:    02/04/25 0957   BP: (!) 146/72   Pulse: (!) 59   Resp: 15    Body mass index is 26.65 kg/m².  Weight: 61.9 kg (136 lb 7.4 oz)   Height: 5' (152.4 cm)     Physical Exam  Constitutional:       Appearance: Normal appearance. She is well-developed.   HENT:      Head: Normocephalic.   Eyes:      Pupils: Pupils are equal, round, and reactive to light.    Cardiovascular:      Rate and Rhythm: Normal rate and regular rhythm.   Pulmonary:      Effort: Pulmonary effort is normal.      Breath sounds: Normal breath sounds.   Abdominal:      General: Bowel sounds are normal.      Palpations: Abdomen is soft.      Tenderness: There is no abdominal tenderness.   Musculoskeletal:         General: Normal range of motion.      Cervical back: Normal range of motion and neck supple.   Skin:     General: Skin is warm.   Neurological:      Mental Status: She is alert and oriented to person, place, and time.         DATA:     Laboratory:  CBC:  Recent Labs   Lab 04/04/24  0423 04/05/24  0419 07/04/24  1024   WBC 7.49 6.78 4.96   Hemoglobin 11.4 L 11.2 L 13.3   Hematocrit 34.8 L 34.0 L 37.3   Platelets 307 290 205       CHEMISTRIES:  Recent Labs   Lab 01/26/23  1413 04/06/23  1021 05/17/23  1258 03/25/24  1109 07/04/24  1024 09/26/24  1140 01/16/25  0820   Glucose  --   --   --    < > 105 103 H 106 H   Sodium 136 138 141   < > 138 139 141   Potassium 4.3 3.7 3.9   < > 3.7 4.0 4.2   BUN 17.9 16.7 20.1 H   < > 22  --   --    Blood Urea Nitrogen  --   --   --   --   --  18 19   Creatinine 0.77 0.76 0.70   < > 0.7 0.73 0.64   eGFR  78 L 80 L 88 L  --   --   --   --    Calcium 9.6 9.1 8.8   < > 9.2 9.8 9.5    < > = values in this interval not displayed.       CARDIAC BIOMARKERS:  Recent Labs   Lab 04/03/24 2031 04/03/24  2250 07/04/24  1024   Troponin I 0.161 H 1.996 H <0.006       COAGS:  Recent Labs   Lab 03/26/24  1912 04/03/24 2031 07/04/24  1024   INR 1.0 1.0 1.1       LIPIDS/LFTS:  Recent Labs   Lab 07/04/24  1024 09/26/24  1140 01/16/25  0820   AST 20 29 22   ALT 21 24 22       Hemoglobin A1C   Date Value Ref Range Status   12/11/2007 5.5 4.5 - 6.2 % Final     Hemoglobin A1c   Date Value Ref Range Status   03/25/2021 4.9 <5.7 % of total Hgb Final     Comment:     For the purpose of screening for the presence of  diabetes:    <5.7%       Consistent with the  absence of diabetes  5.7-6.4%    Consistent with increased risk for diabetes              (prediabetes)  > or =6.5%  Consistent with diabetes    This assay result is consistent with a decreased risk  of diabetes.    Currently, no consensus exists regarding use of  hemoglobin A1c for diagnosis of diabetes in children.    According to American Diabetes Association (ADA)  guidelines, hemoglobin A1c <7.0% represents optimal  control in non-pregnant diabetic patients. Different  metrics may apply to specific patient populations.   Standards of Medical Care in Diabetes(ADA).         % HEMOGLOBIN A1C   Date Value Ref Range Status   11/08/2022 4.9 <5.7 % of total Hgb Final     Comment:     For the purpose of screening for the presence of  diabetes:    <5.7%       Consistent with the absence of diabetes  5.7-6.4%    Consistent with increased risk for diabetes              (prediabetes)  > or =6.5%  Consistent with diabetes    This assay result is consistent with a decreased risk  of diabetes.    Currently, no consensus exists regarding use of  hemoglobin A1c for diagnosis of diabetes in children.    According to American Diabetes Association (ADA)  guidelines, hemoglobin A1c <7.0% represents optimal  control in non-pregnant diabetic patients. Different  metrics may apply to specific patient populations.   Standards of Medical Care in Diabetes(ADA).           TSH        The ASCVD Risk score (Zack VALENCIA, et al., 2019) failed to calculate for the following reasons:    The 2019 ASCVD risk score is only valid for ages 40 to 79    Risk score cannot be calculated because patient has a medical history suggesting prior/existing ASCVD       BNP    Lab Results   Component Value Date/Time    BNP 43 04/03/2024 08:31 PM     (H) 03/25/2024 11:09 AM    BNP 85 04/29/2015 03:31 PM     Lab Results   Component Value Date    LDLCALC 75.8 06/08/2016        ASSESSMENT AND PLAN     Patient Active Problem List   Diagnosis    Depression     Essential hypertension, benign    Coronary artery disease involving native coronary artery of native heart with unstable angina pectoris    Hypertension    Mixed hyperlipidemia    Dressler's syndrome    Normocytic anemia    Chronic diastolic heart failure         ALLERGIES AND MEDICATION:     Review of patient's allergies indicates:   Allergen Reactions    Effexor [venlafaxine] Hives, Shortness Of Breath and Other (See Comments)     Dry mouth and chest pain    Bactrim [sulfamethoxazole-trimethoprim]     Codeine Itching and Other (See Comments)     Tightness in throat        Medication List            Accurate as of February 4, 2025 10:15 AM. If you have any questions, ask your nurse or doctor.                CONTINUE taking these medications      amLODIPine 5 MG tablet  Commonly known as: NORVASC  Take 1 tablet (5 mg total) by mouth once daily.     aspirin 81 MG EC tablet  Commonly known as: ECOTRIN     atenoloL 25 MG tablet  Commonly known as: TENORMIN     atorvastatin 80 MG tablet  Commonly known as: LIPITOR     clonazePAM 1 MG tablet  Commonly known as: KlonoPIN  Take 1 tablet (1 mg total) by mouth 2 (two) times daily.     clopidogreL 75 mg tablet  Commonly known as: PLAVIX  Take 1 tablet (75 mg total) by mouth once daily.     colchicine 0.6 mg tablet  Commonly known as: COLCRYS  TAKE 1 TABLET BY MOUTH EVERY DAY     HYDROcodone-acetaminophen 5-325 mg per tablet  Commonly known as: NORCO  Take 1 tablet by mouth every 6 (six) hours as needed for Pain.     isosorbide mononitrate 60 MG 24 hr tablet  Commonly known as: IMDUR  Take 1 tablet (60 mg total) by mouth once daily.     metoprolol succinate 50 MG 24 hr tablet  Commonly known as: TOPROL-XL  Take 1 tablet (50 mg total) by mouth once daily.     multivitamin per tablet  Commonly known as: THERAGRAN     nitroGLYCERIN 0.4 MG SL tablet  Commonly known as: NITROSTAT  Place 1 tablet (0.4 mg total) under the tongue every 5 (five) minutes as needed for Chest pain.      traZODone 50 MG tablet  Commonly known as: DESYREL              No orders of the defined types were placed in this encounter.    Assessment & Plan    IMPRESSION:  Cardiac s/p stent placement in RCA and circumflex is stable  August stress test results were normal with no ischemia noted  August lipid panel: total cholesterol 148, HDL 63, triglycerides 90, LDL 68 at outside hospital in Care everywhere  Home blood pressure readings are well-controlled    PLAN SUMMARY:  - Continue Metropolol at current dose  - Continue Imdor at current dose  - Continue amlodipine and atenolol at current doses  - Continue Nitroglycerin as needed  - Continue atorvastatin at current dose  - Continue aspirin and flavix at current doses  - EKG performed  - Follow up in 6 months  - Contact office if symptoms occur before next visit    EKG personally reviewed:  - EKG performed, which showed sinus bradycardia with HR of 55 BPM.  - Continued Metropolol at current dose.    HYPERTENSION:  - Continued amlodipine and atenolol at current doses.    CORONARY ARTERY DISEASE:  - status post PCI of RCA and circumflex.  Angina free  - Continued aspirin and flavix at current doses.  - Continued Nitroglycerin as needed.  - Continued Imdur at current dose.    HYPERLIPIDEMIA:  - Continued atorvastatin at current dose.    FOLLOW-UP:  - Follow up in 6 months.  - Contact the office if any symptoms occur before the next visit.         This note was generated with the assistance of ambient listening technology. Verbal consent was obtained by the patient and accompanying visitor(s) for the recording of patient appointment to facilitate this note. I attest to having reviewed and edited the generated note for accuracy, though some syntax or spelling errors may persist. Please contact the author of this note for any clarification.          Thank you very much for involving me in the care of your patient.  Please do not hesitate to contact me if there are any  questions.      Vitaliy Brown MD, FAC, [facility]  Interventional Cardiologist, [facility].     Visit today included increased complexity associated with the care of the episodic problem coronary artery disease status post PCI, dyslipidemia and hypertension addressed and managing the longitudinal care of the patient due to the serious and/or complex managed problem(s) coronary artery disease status post PCI, dyslipidemia, hypertension.      This note was dictated with the help of speech recognition software.  There might be un-intended errors and/or substitutions.

## 2025-02-05 LAB
OHS QRS DURATION: 84 MS
OHS QTC CALCULATION: 396 MS

## 2025-04-24 ENCOUNTER — NURSE TRIAGE (OUTPATIENT)
Dept: ADMINISTRATIVE | Facility: CLINIC | Age: 80
End: 2025-04-24
Payer: MEDICARE

## 2025-04-24 ENCOUNTER — HOSPITAL ENCOUNTER (OUTPATIENT)
Facility: HOSPITAL | Age: 80
Discharge: HOME | End: 2025-04-25
Attending: EMERGENCY MEDICINE
Payer: MEDICARE

## 2025-04-24 DIAGNOSIS — R07.9 CHEST PAIN: ICD-10-CM

## 2025-04-24 LAB
ABSOLUTE EOSINOPHIL (OHS): 0.19 K/UL
ABSOLUTE MONOCYTE (OHS): 0.61 K/UL (ref 0.3–1)
ABSOLUTE NEUTROPHIL COUNT (OHS): 3.42 K/UL (ref 1.8–7.7)
ALBUMIN SERPL BCP-MCNC: 3.5 G/DL (ref 3.5–5.2)
ALP SERPL-CCNC: 100 UNIT/L (ref 40–150)
ALT SERPL W/O P-5'-P-CCNC: 21 UNIT/L (ref 10–44)
ANION GAP (OHS): 8 MMOL/L (ref 8–16)
AST SERPL-CCNC: 22 UNIT/L (ref 11–45)
BASOPHILS # BLD AUTO: 0.02 K/UL
BASOPHILS NFR BLD AUTO: 0.4 %
BILIRUB SERPL-MCNC: 0.3 MG/DL (ref 0.1–1)
BNP SERPL-MCNC: 61 PG/ML (ref 0–99)
BUN SERPL-MCNC: 21 MG/DL (ref 8–23)
CALCIUM SERPL-MCNC: 9.2 MG/DL (ref 8.7–10.5)
CHLORIDE SERPL-SCNC: 110 MMOL/L (ref 95–110)
CO2 SERPL-SCNC: 23 MMOL/L (ref 23–29)
CREAT SERPL-MCNC: 0.7 MG/DL (ref 0.5–1.4)
ERYTHROCYTE [DISTWIDTH] IN BLOOD BY AUTOMATED COUNT: 13.5 % (ref 11.5–14.5)
GFR SERPLBLD CREATININE-BSD FMLA CKD-EPI: >60 ML/MIN/1.73/M2
GLUCOSE SERPL-MCNC: 119 MG/DL (ref 70–110)
HCT VFR BLD AUTO: 38.6 % (ref 37–48.5)
HGB BLD-MCNC: 12.6 GM/DL (ref 12–16)
IMM GRANULOCYTES # BLD AUTO: 0.01 K/UL (ref 0–0.04)
IMM GRANULOCYTES NFR BLD AUTO: 0.2 % (ref 0–0.5)
LYMPHOCYTES # BLD AUTO: 1.35 K/UL (ref 1–4.8)
MCH RBC QN AUTO: 26.9 PG (ref 27–31)
MCHC RBC AUTO-ENTMCNC: 32.6 G/DL (ref 32–36)
MCV RBC AUTO: 83 FL (ref 82–98)
NUCLEATED RBC (/100WBC) (OHS): 0 /100 WBC
PLATELET # BLD AUTO: 229 K/UL (ref 150–450)
PMV BLD AUTO: 10 FL (ref 9.2–12.9)
POTASSIUM SERPL-SCNC: 4.1 MMOL/L (ref 3.5–5.1)
PROT SERPL-MCNC: 6.4 GM/DL (ref 6–8.4)
RBC # BLD AUTO: 4.68 M/UL (ref 4–5.4)
RELATIVE EOSINOPHIL (OHS): 3.4 %
RELATIVE LYMPHOCYTE (OHS): 24.1 % (ref 18–48)
RELATIVE MONOCYTE (OHS): 10.9 % (ref 4–15)
RELATIVE NEUTROPHIL (OHS): 61 % (ref 38–73)
SODIUM SERPL-SCNC: 141 MMOL/L (ref 136–145)
TROPONIN I SERPL DL<=0.01 NG/ML-MCNC: <0.006 NG/ML
TROPONIN I SERPL DL<=0.01 NG/ML-MCNC: <0.006 NG/ML
TSH SERPL-ACNC: 2.33 UIU/ML (ref 0.4–4)
WBC # BLD AUTO: 5.6 K/UL (ref 3.9–12.7)

## 2025-04-24 PROCEDURE — 83880 ASSAY OF NATRIURETIC PEPTIDE: CPT

## 2025-04-24 PROCEDURE — 99285 EMERGENCY DEPT VISIT HI MDM: CPT | Mod: 25

## 2025-04-24 PROCEDURE — 63600175 PHARM REV CODE 636 W HCPCS

## 2025-04-24 PROCEDURE — 85025 COMPLETE CBC W/AUTO DIFF WBC: CPT

## 2025-04-24 PROCEDURE — 93005 ELECTROCARDIOGRAM TRACING: CPT

## 2025-04-24 PROCEDURE — G0378 HOSPITAL OBSERVATION PER HR: HCPCS

## 2025-04-24 PROCEDURE — 84484 ASSAY OF TROPONIN QUANT: CPT | Mod: 91

## 2025-04-24 PROCEDURE — 84484 ASSAY OF TROPONIN QUANT: CPT

## 2025-04-24 PROCEDURE — 96372 THER/PROPH/DIAG INJ SC/IM: CPT

## 2025-04-24 PROCEDURE — 36415 COLL VENOUS BLD VENIPUNCTURE: CPT

## 2025-04-24 PROCEDURE — 93010 ELECTROCARDIOGRAM REPORT: CPT | Mod: ,,, | Performed by: INTERNAL MEDICINE

## 2025-04-24 PROCEDURE — 25000003 PHARM REV CODE 250

## 2025-04-24 PROCEDURE — 80053 COMPREHEN METABOLIC PANEL: CPT

## 2025-04-24 PROCEDURE — 84443 ASSAY THYROID STIM HORMONE: CPT

## 2025-04-24 RX ORDER — ASPIRIN 81 MG/1
81 TABLET ORAL DAILY
Status: DISCONTINUED | OUTPATIENT
Start: 2025-04-24 | End: 2025-04-24

## 2025-04-24 RX ORDER — ENOXAPARIN SODIUM 100 MG/ML
40 INJECTION SUBCUTANEOUS EVERY 24 HOURS
Status: DISCONTINUED | OUTPATIENT
Start: 2025-04-24 | End: 2025-04-25 | Stop reason: HOSPADM

## 2025-04-24 RX ORDER — METOPROLOL SUCCINATE 50 MG/1
50 TABLET, EXTENDED RELEASE ORAL DAILY
Status: DISCONTINUED | OUTPATIENT
Start: 2025-04-25 | End: 2025-04-25 | Stop reason: HOSPADM

## 2025-04-24 RX ORDER — POLYETHYLENE GLYCOL 3350 17 G/17G
17 POWDER, FOR SOLUTION ORAL DAILY
Status: DISCONTINUED | OUTPATIENT
Start: 2025-04-25 | End: 2025-04-25 | Stop reason: HOSPADM

## 2025-04-24 RX ORDER — CLOPIDOGREL BISULFATE 75 MG/1
75 TABLET ORAL DAILY
Status: DISCONTINUED | OUTPATIENT
Start: 2025-04-25 | End: 2025-04-25 | Stop reason: HOSPADM

## 2025-04-24 RX ORDER — ATENOLOL 25 MG/1
25 TABLET ORAL DAILY
Status: DISCONTINUED | OUTPATIENT
Start: 2025-04-25 | End: 2025-04-24

## 2025-04-24 RX ORDER — ACETAMINOPHEN 325 MG/1
650 TABLET ORAL EVERY 4 HOURS PRN
Status: DISCONTINUED | OUTPATIENT
Start: 2025-04-24 | End: 2025-04-25 | Stop reason: HOSPADM

## 2025-04-24 RX ORDER — NITROGLYCERIN 0.4 MG/1
0.4 TABLET SUBLINGUAL EVERY 5 MIN PRN
Status: DISCONTINUED | OUTPATIENT
Start: 2025-04-24 | End: 2025-04-25 | Stop reason: HOSPADM

## 2025-04-24 RX ORDER — ASPIRIN 81 MG/1
81 TABLET ORAL DAILY
Status: DISCONTINUED | OUTPATIENT
Start: 2025-04-25 | End: 2025-04-25 | Stop reason: HOSPADM

## 2025-04-24 RX ORDER — IBUPROFEN 200 MG
24 TABLET ORAL
Status: DISCONTINUED | OUTPATIENT
Start: 2025-04-24 | End: 2025-04-25 | Stop reason: HOSPADM

## 2025-04-24 RX ORDER — GLUCAGON 1 MG
1 KIT INJECTION
Status: DISCONTINUED | OUTPATIENT
Start: 2025-04-24 | End: 2025-04-25 | Stop reason: HOSPADM

## 2025-04-24 RX ORDER — IBUPROFEN 200 MG
16 TABLET ORAL
Status: DISCONTINUED | OUTPATIENT
Start: 2025-04-24 | End: 2025-04-25 | Stop reason: HOSPADM

## 2025-04-24 RX ORDER — SODIUM CHLORIDE 0.9 % (FLUSH) 0.9 %
10 SYRINGE (ML) INJECTION EVERY 12 HOURS PRN
Status: DISCONTINUED | OUTPATIENT
Start: 2025-04-24 | End: 2025-04-25 | Stop reason: HOSPADM

## 2025-04-24 RX ORDER — TALC
6 POWDER (GRAM) TOPICAL NIGHTLY PRN
Status: DISCONTINUED | OUTPATIENT
Start: 2025-04-24 | End: 2025-04-25 | Stop reason: HOSPADM

## 2025-04-24 RX ORDER — NALOXONE HCL 0.4 MG/ML
0.02 VIAL (ML) INJECTION
Status: DISCONTINUED | OUTPATIENT
Start: 2025-04-24 | End: 2025-04-25 | Stop reason: HOSPADM

## 2025-04-24 RX ORDER — ATORVASTATIN CALCIUM 40 MG/1
80 TABLET, FILM COATED ORAL DAILY
Status: DISCONTINUED | OUTPATIENT
Start: 2025-04-25 | End: 2025-04-25 | Stop reason: HOSPADM

## 2025-04-24 RX ORDER — ONDANSETRON HYDROCHLORIDE 2 MG/ML
4 INJECTION, SOLUTION INTRAVENOUS EVERY 8 HOURS PRN
Status: DISCONTINUED | OUTPATIENT
Start: 2025-04-24 | End: 2025-04-25 | Stop reason: HOSPADM

## 2025-04-24 RX ORDER — ISOSORBIDE MONONITRATE 30 MG/1
60 TABLET, EXTENDED RELEASE ORAL DAILY
Status: DISCONTINUED | OUTPATIENT
Start: 2025-04-25 | End: 2025-04-25 | Stop reason: HOSPADM

## 2025-04-24 RX ORDER — TRAZODONE HYDROCHLORIDE 50 MG/1
50 TABLET ORAL NIGHTLY
Status: DISCONTINUED | OUTPATIENT
Start: 2025-04-24 | End: 2025-04-25 | Stop reason: HOSPADM

## 2025-04-24 RX ORDER — COLCHICINE 0.6 MG/1
0.6 TABLET, FILM COATED ORAL DAILY
Status: DISCONTINUED | OUTPATIENT
Start: 2025-04-25 | End: 2025-04-25 | Stop reason: HOSPADM

## 2025-04-24 RX ADMIN — TRAZODONE HYDROCHLORIDE 50 MG: 50 TABLET ORAL at 10:04

## 2025-04-24 RX ADMIN — ENOXAPARIN SODIUM 40 MG: 40 INJECTION SUBCUTANEOUS at 08:04

## 2025-04-24 NOTE — ED PROVIDER NOTES
Encounter Date: 4/24/2025       History     Chief Complaint   Patient presents with    Chest Pain     Pt arrived via ems, pt chief complaint is Chest pain. Pt states has been having cp since 0900 this morning that radiates to left arm. Pt received 324mg asa and 1 spray of nitro, pt states has no pain at this time.      79-year-old female with a past medical history of CAD/STEMI s/p PCI (3/25, 3/27), hypertension, hyperlipidemia, depression, anxiety, brought in by EMS for evaluation of chest pain.  Patient reports that she has been having intermittent chest heaviness for the past week.  Her episodes last proximally 20 minutes before resolving spontaneously.  Today however, patient noted left arm heaviness that was constant as well as intermittent chest pressure.  She also noted shortness of breath.  Patient called her cardiologist given her cardiac history and was told to come to ED for further evaluation.  Patient received 325 mg aspirin and nitroglycerin from EMS with resolution of her chest pain.  She denies nausea/vomiting, abdominal pain, leg swelling, fever/chills, any other symptoms.    The history is provided by the patient and medical records. No  was used.     Review of patient's allergies indicates:   Allergen Reactions    Effexor [venlafaxine] Hives, Shortness Of Breath and Other (See Comments)     Dry mouth and chest pain    Bactrim [sulfamethoxazole-trimethoprim]     Codeine Itching and Other (See Comments)     Tightness in throat     Past Medical History:   Diagnosis Date    AC (acromioclavicular) joint bone spurs, unspecified laterality     Coronary artery disease     Depression     Hyperlipidemia     Hypertension     Osteoarthritis     ST elevation (STEMI) myocardial infarction of unspecified site     Vaginal delivery     x2     Past Surgical History:   Procedure Laterality Date    ADENOIDECTOMY      ANGIOGRAM, CORONARY, WITH LEFT HEART CATHETERIZATION N/A 4/3/2024    Procedure:  Angiogram, Coronary, with Left Heart Cath;  Surgeon: Cl Rodriguez MD;  Location: [facility] CATH LAB;  Service: Cardiology;  Laterality: N/A;    BREAST SURGERY      biospy    CORONARY STENT PLACEMENT N/A 3/27/2024    Procedure: INSERTION, STENT, CORONARY ARTERY;  Surgeon: Vitaliy Brown MD;  Location: [facility] CATH LAB;  Service: Cardiology;  Laterality: N/A;  PCI circumflex, right common femoral artery access    HYSTERECTOMY      IVUS, CORONARY  3/27/2024    Procedure: IVUS, Coronary;  Surgeon: Vitaliy Brown MD;  Location: [facility] CATH LAB;  Service: Cardiology;;    IVUS, CORONARY  4/3/2024    Procedure: IVUS, Coronary;  Surgeon: Cl Rodriguez MD;  Location: [facility] CATH LAB;  Service: Cardiology;;    JOINT REPLACEMENT Bilateral     knee    LEFT HEART CATHETERIZATION Left 3/25/2024    Procedure: Left heart cath;  Surgeon: Vitaliy Brown MD;  Location: [facility] CATH LAB;  Service: Cardiology;  Laterality: Left;    PERCUTANEOUS TRANSLUMINAL BALLOON ANGIOPLASTY OF CORONARY ARTERY  4/3/2024    Procedure: Angioplasty-coronary;  Surgeon: Cl Rodriguez MD;  Location: [facility] CATH LAB;  Service: Cardiology;;    stent-heart      TONSILLECTOMY      TOTAL KNEE ARTHROPLASTY       Family History   Problem Relation Name Age of Onset    Heart disease Mother      Heart disease Father       Social History[1]      Physical Exam     Initial Vitals [04/24/25 1529]   BP Pulse Resp Temp SpO2   (!) 177/73 60 16 98.2 °F (36.8 °C) 97 %      MAP       --         Physical Exam    Nursing note and vitals reviewed.  Constitutional: She appears well-developed and well-nourished.   HENT:   Head: Normocephalic and atraumatic.   Eyes: Conjunctivae and EOM are normal. Pupils are equal, round, and reactive to light.   Cardiovascular:  Regular rhythm and normal heart sounds.   Bradycardia present.         Pulses:       Radial pulses are 2+ on the right side and 2+ on the left side.        Dorsalis pedis pulses are 2+ on the right side and 2+ on  the left side.   Pulmonary/Chest: Breath sounds normal. No respiratory distress. She has no wheezes. She has no rhonchi. She has no rales.   Abdominal: Abdomen is soft. There is no abdominal tenderness.   Musculoskeletal:      Comments: No lower extremity edema     Neurological: She is alert and oriented to person, place, and time.         ED Course   Procedures  Labs Reviewed   COMPREHENSIVE METABOLIC PANEL - Abnormal       Result Value    Sodium 141      Potassium 4.1      Chloride 110      CO2 23      Glucose 119 (*)     BUN 21      Creatinine 0.7      Calcium 9.2      Protein Total 6.4      Albumin 3.5      Bilirubin Total 0.3            AST 22      ALT 21      Anion Gap 8      eGFR >60     CBC WITH DIFFERENTIAL - Abnormal    WBC 5.60      RBC 4.68      HGB 12.6      HCT 38.6      MCV 83      MCH 26.9 (*)     MCHC 32.6      RDW 13.5      Platelet Count 229      MPV 10.0      Nucleated RBC 0      Neut % 61.0      Lymph % 24.1      Mono % 10.9      Eos % 3.4      Basophil % 0.4      Imm Grans % 0.2      Neut # 3.42      Lymph # 1.35      Mono # 0.61      Eos # 0.19      Baso # 0.02      Imm Grans # 0.01     TROPONIN I - Normal    Troponin-I <0.006     B-TYPE NATRIURETIC PEPTIDE - Normal    BNP 61     CBC W/ AUTO DIFFERENTIAL    Narrative:     The following orders were created for panel order CBC auto differential.  Procedure                               Abnormality         Status                     ---------                               -----------         ------                     CBC with Differential[[id number]]       Abnormal            Final result                 Please view results for these tests on the individual orders.     EKG Readings: (Independently Interpreted)   Initial Reading: No STEMI. Previous EKG: Compared with most recent EKG Previous EKG Date: 02/04/2025. Rhythm: Sinus Bradycardia. Heart Rate: 58. Conduction: 1st Degree AV Block. Axis: Normal. Other Impression: QRS/QTC within  normal limits     ECG Results              EKG 12-lead (Final result)        Collection Time Result Time QRS Duration OHS QTC Calculation    04/24/25 15:47:56 04/25/25 16:53:00 84 386                     Final result by Interface, Lab In [facility] (04/25/25 16:53:05)                   Narrative:    Test Reason : R07.9,    Vent. Rate :  58 BPM     Atrial Rate :  58 BPM     P-R Int : 218 ms          QRS Dur :  84 ms      QT Int : 394 ms       P-R-T Axes :  71  12  80 degrees    QTcB Int : 386 ms    Sinus bradycardia with 1st degree A-V block  Anterior infarct (cited on or before 25-Mar-2024)  Abnormal ECG  When compared with ECG of 04-Feb-2025 10:47,  No significant change was found  Confirmed by Vitaliy Brown ([id number]) on 4/25/2025 4:52:56 PM    Referred By: AAAREFERRAL SELF           Confirmed By: Vitaliy Brown                                  Imaging Results              X-Ray Chest AP Portable (Final result)  Result time 04/24/25 16:45:27      Final result by Reinier Gruber MD (04/24/25 16:45:27)                   Impression:      No acute cardiopulmonary disease.  No detrimental interval change.      Electronically signed by: Reinier Gruber MD  Date:    04/24/2025  Time:    16:45               Narrative:    EXAMINATION:  XR CHEST AP PORTABLE    CLINICAL HISTORY:  Chest Pain;    TECHNIQUE:  Single frontal view of the chest was performed.    COMPARISON:  07/04/2024    FINDINGS:  Heart size and pulmonary vascularity are within normal limits.  Mild tortuosity of the thoracic aorta with atherosclerotic calcification in the wall of the aortic arch.  Lungs are satisfactorily expanded.  Slight elevation of the right hemidiaphragm.  No airspace consolidation or pleural effusion.  No pneumothorax.  Skeletal structures appear intact.                                       Medications   kit prep of Tc-99m-tetrofosmin 1.38 mg SolR 10.4 millicurie (10.4 millicuries Intravenous Given 4/25/25 1044)   kit prep of Tc-99m-tetrofosmin 1.38 mg SolR  30.3 millicurie (30.3 millicuries Intravenous Given 4/25/25 1151)   regadenoson injection 0.4 mg (0.4 mg Intravenous Given 4/25/25 1201)     Medical Decision Making  79-year-old female with a past medical history of CAD/STEMI s/p PCI (3/25, 3/27), hypertension, hyperlipidemia, depression, anxiety, brought in by EMS for evaluation of chest pain.    Differential diagnosis includes, but is not limited to, ACS, PE, pneumothorax, pneumonia, CHF, dissection, pericardial effusion.    In emergency department, patient hemodynamically stable, no acute distress.  No evidence of pneumothorax, pneumonia, pleural effusion on chest x-ray.  Patient is not tachypneic, has only mild pain that does not radiate to her back, low concern for dissection in his patient.  No muffled heart sounds and EKG not showing low voltage, low concern for an order pericardial effusion.  Initial troponin and BNP within normal limits, lowering concern for ACS or CHF.  EKG without new ST depressions or elevations.  However, given patient's history and risk factors, still concern for high-risk chest pain.  Will admit patient for cardiology consult nuclear medicine stress test.  Discussed patient with Hospital Medicine, who evaluated patient and agrees to admit to their service.    Amount and/or Complexity of Data Reviewed  External Data Reviewed: radiology.     Details: === Results for orders placed during the hospital encounter of 04/03/24 ===    Echo    - Interpretation Summary -  ·  Left Ventricle: Regional wall motion abnormalities present (mild basal inferior hypokinesis). There is normal systolic function with a visually estimated ejection fraction of 55 - 60%. Grade I diastolic dysfunction.  ·  Right Ventricle: Normal right ventricular cavity size. Systolic function is normal.  ·  Aortic Valve: The aortic valve is a trileaflet valve. There is mild aortic valve sclerosis. There is mild aortic regurgitation.  ·  Pulmonary Artery: The estimated  pulmonary artery systolic pressure is 14 mmHg.      Labs: ordered. Decision-making details documented in ED Course.  Radiology: ordered and independent interpretation performed. Decision-making details documented in ED Course.  ECG/medicine tests: ordered and independent interpretation performed. Decision-making details documented in ED Course.      Additional MDM:   Heart Score:    History:          Moderately suspicious.  ECG:             Normal  Age:               >65 years  Risk factors: >= 3 risk factors or history of atherosclerotic disease  Troponin:       Less than or equal to normal limit  Heart Score = 5               Attending Attestation:   Physician Attestation Statement for Resident:  As the supervising MD   Physician Attestation Statement: I have personally seen and examined this patient.   I agree with the above history.  -:   As the supervising MD I agree with the above PE.     As the supervising MD I agree with the above treatment, course, plan, and disposition.   -: Chest pain.  Moderate concern for cardiac chest pain.  Heart score of 5.  Pain was relieved with nitroglycerin.  Chest pain while in the emergency room.  Will place in observation for rule out ACS.   I have reviewed and agree with the residents interpretation of the following: lab data, x-rays and EKG.  I have reviewed the following: old records at this facility.                ED Course as of 04/26/25 0649   Thu Apr 24, 2025   1626 CBC auto differential(!)  No anemia or leukocytosis []   1626 Comprehensive metabolic panel(!)  No concerning electrolyte abnormalities []   1627 X-Ray Chest AP Portable  Independent interpretation of this chest x-ray: No large effusion, consolidation, or pneumothorax []   1657 Troponin I #1  WNL, lowering concern for ACS []      ED Course User Index  [] Jordan Goel MD                           Clinical Impression:  Final diagnoses:  [R07.9] Chest pain          ED Disposition Condition     Observation                   Jordan Goel MD  Resident  04/24/25 2028         [1]   Social History  Tobacco Use    Smoking status: Never    Smokeless tobacco: Never   Substance Use Topics    Alcohol use: No    Drug use: No        Salvador Villa MD  04/26/25 0644

## 2025-04-24 NOTE — NURSING
[facility], [facility]  Nurses Note -- 4 Eyes        Small bruise noted to L upper thigh     4/24/2025       Skin assessed on: Admit      [x] No Pressure Injuries Present    []Prevention Measures Documented    [] Yes LDA  for Pressure Injury Previously documented     [] Yes New Pressure Injury Discovered   [] LDA for New Pressure Injury Added      Attending RN:  Jasmyn Roldan LPN     Second RN:  KIRAN Fajardo

## 2025-04-24 NOTE — TELEPHONE ENCOUNTER
Pt c/o chest heaviness and left arm pain. Chest pain has been off and on for several days per pt. Denies CP now. Left shoulder pain now 7-8/10. Denies injury to arm. Pt states that pain is similar to pain she had with previous heart attack. States that she also has chest heaviness. Advised to call 911 now per protocol. VU. Encounter routed to provider.   Reason for Disposition   Similar pain previously and it was from 'heart attack'    Additional Information   Negative: Shock suspected (e.g., cold/pale/clammy skin, too weak to stand, low BP, rapid pulse)    Protocols used: Shoulder Pain-A-OH

## 2025-04-24 NOTE — NURSING
Pt has arrived to the unit on RA via stretcher with daughter Deanne at bedside, NAD noted. Bed in lowest position, call light in reach, pt refused bed alarm, will continue to monitor

## 2025-04-25 VITALS
SYSTOLIC BLOOD PRESSURE: 154 MMHG | HEIGHT: 60 IN | RESPIRATION RATE: 18 BRPM | WEIGHT: 138.25 LBS | TEMPERATURE: 98 F | DIASTOLIC BLOOD PRESSURE: 68 MMHG | HEART RATE: 63 BPM | BODY MASS INDEX: 27.14 KG/M2 | OXYGEN SATURATION: 98 %

## 2025-04-25 LAB
ABSOLUTE EOSINOPHIL (OHS): 0.23 K/UL
ABSOLUTE MONOCYTE (OHS): 0.52 K/UL (ref 0.3–1)
ABSOLUTE NEUTROPHIL COUNT (OHS): 2.78 K/UL (ref 1.8–7.7)
ALBUMIN SERPL BCP-MCNC: 3.3 G/DL (ref 3.5–5.2)
ALP SERPL-CCNC: 93 UNIT/L (ref 40–150)
ALT SERPL W/O P-5'-P-CCNC: 21 UNIT/L (ref 10–44)
ANION GAP (OHS): 8 MMOL/L (ref 8–16)
ASCENDING AORTA: 3 CM
AST SERPL-CCNC: 19 UNIT/L (ref 11–45)
AV INDEX (PROSTH): 0.71
AV MEAN GRADIENT: 3 MMHG
AV PEAK GRADIENT: 7 MMHG
AV REGURGITATION PRESSURE HALF TIME: 705 MS
AV VALVE AREA BY VELOCITY RATIO: 1.7 CM²
AV VALVE AREA: 2 CM²
AV VELOCITY RATIO: 0.62
BASOPHILS # BLD AUTO: 0.02 K/UL
BASOPHILS NFR BLD AUTO: 0.4 %
BILIRUB SERPL-MCNC: 0.4 MG/DL (ref 0.1–1)
BSA FOR ECHO PROCEDURE: 1.63 M2
BUN SERPL-MCNC: 20 MG/DL (ref 8–23)
CALCIUM SERPL-MCNC: 8.7 MG/DL (ref 8.7–10.5)
CHLORIDE SERPL-SCNC: 107 MMOL/L (ref 95–110)
CHOLEST SERPL-MCNC: 128 MG/DL (ref 120–199)
CHOLEST/HDLC SERPL: 2.5 {RATIO} (ref 2–5)
CO2 SERPL-SCNC: 26 MMOL/L (ref 23–29)
CREAT SERPL-MCNC: 0.7 MG/DL (ref 0.5–1.4)
CV ECHO LV RWT: 0.6 CM
CV STRESS BASE HR: 58 BPM
DIASTOLIC BLOOD PRESSURE: 75 MMHG
DOP CALC AO PEAK VEL: 1.3 M/S
DOP CALC AO VTI: 27.3 CM
DOP CALC LVOT AREA: 2.8 CM2
DOP CALC LVOT DIAMETER: 1.9 CM
DOP CALC LVOT PEAK VEL: 0.8 M/S
DOP CALC LVOT STROKE VOLUME: 55.3 CM3
DOP CALCLVOT PEAK VEL VTI: 19.5 CM
E WAVE DECELERATION TIME: 214 MSEC
E/A RATIO: 0.71
E/E' RATIO: 11 M/S
ECHO LV POSTERIOR WALL: 1.3 CM (ref 0.6–1.1)
ERYTHROCYTE [DISTWIDTH] IN BLOOD BY AUTOMATED COUNT: 13.5 % (ref 11.5–14.5)
FRACTIONAL SHORTENING: 30.2 % (ref 28–44)
GFR SERPLBLD CREATININE-BSD FMLA CKD-EPI: >60 ML/MIN/1.73/M2
GLUCOSE SERPL-MCNC: 91 MG/DL (ref 70–110)
HCT VFR BLD AUTO: 39.4 % (ref 37–48.5)
HDLC SERPL-MCNC: 52 MG/DL (ref 40–75)
HDLC SERPL: 40.6 % (ref 20–50)
HGB BLD-MCNC: 12.7 GM/DL (ref 12–16)
IMM GRANULOCYTES # BLD AUTO: 0.02 K/UL (ref 0–0.04)
IMM GRANULOCYTES NFR BLD AUTO: 0.4 % (ref 0–0.5)
INTERVENTRICULAR SEPTUM: 1 CM (ref 0.6–1.1)
IVC DIAMETER: 1.53 CM
LA MAJOR: 3.5 CM
LA MINOR: 4.3 CM
LA WIDTH: 3.4 CM
LDLC SERPL CALC-MCNC: 58.6 MG/DL (ref 63–159)
LEFT ATRIUM SIZE: 3.5 CM
LEFT ATRIUM VOLUME INDEX: 24 ML/M2
LEFT ATRIUM VOLUME: 39 CM3
LEFT INTERNAL DIMENSION IN SYSTOLE: 3 CM (ref 2.1–4)
LEFT VENTRICLE DIASTOLIC VOLUME INDEX: 51.88 ML/M2
LEFT VENTRICLE DIASTOLIC VOLUME: 83 ML
LEFT VENTRICLE MASS INDEX: 108.5 G/M2
LEFT VENTRICLE SYSTOLIC VOLUME INDEX: 21.9 ML/M2
LEFT VENTRICLE SYSTOLIC VOLUME: 35 ML
LEFT VENTRICULAR INTERNAL DIMENSION IN DIASTOLE: 4.3 CM (ref 3.5–6)
LEFT VENTRICULAR MASS: 173.6 G
LV LATERAL E/E' RATIO: 9.5 M/S
LV SEPTAL E/E' RATIO: 14.3 M/S
LVED V (TEICH): 82.53 ML
LVES V (TEICH): 34.81 ML
LVOT MG: 1.42 MMHG
LVOT MV: 0.57 CM/S
LYMPHOCYTES # BLD AUTO: 1.66 K/UL (ref 1–4.8)
MAGNESIUM SERPL-MCNC: 2 MG/DL (ref 1.6–2.6)
MCH RBC QN AUTO: 27.1 PG (ref 27–31)
MCHC RBC AUTO-ENTMCNC: 32.2 G/DL (ref 32–36)
MCV RBC AUTO: 84 FL (ref 82–98)
MV PEAK A VEL: 0.8 M/S
MV PEAK E VEL: 0.57 M/S
MV STENOSIS PRESSURE HALF TIME: 62.14 MS
MV VALVE AREA P 1/2 METHOD: 3.54 CM2
NONHDLC SERPL-MCNC: 76 MG/DL
NUC REST EJECTION FRACTION: 70
NUCLEATED RBC (/100WBC) (OHS): 0 /100 WBC
OHS CV CPX 85 PERCENT MAX PREDICTED HEART RATE MALE: 118
OHS CV CPX MAX PREDICTED HEART RATE: 139
OHS CV CPX PATIENT IS FEMALE: 1
OHS CV CPX PATIENT IS MALE: 0
OHS CV CPX PEAK DIASTOLIC BLOOD PRESSURE: 68 MMHG
OHS CV CPX PEAK HEAR RATE: 82 BPM
OHS CV CPX PEAK RATE PRESSURE PRODUCT: NORMAL
OHS CV CPX PEAK SYSTOLIC BLOOD PRESSURE: 123 MMHG
OHS CV CPX PERCENT MAX PREDICTED HEART RATE ACHIEVED: 61
OHS CV CPX RATE PRESSURE PRODUCT PRESENTING: NORMAL
OHS CV INITIAL DOSE: 10.4 MCG/KG/MIN
OHS CV PEAK DOSE: 30.3 MCG/KG/MIN
OHS CV RV/LV RATIO: 0.65 CM
OHS QRS DURATION: 84 MS
OHS QTC CALCULATION: 386 MS
PHOSPHATE SERPL-MCNC: 3.8 MG/DL (ref 2.7–4.5)
PISA AR MAX VEL: 5.05 M/S
PISA TR MAX VEL: 2.2 M/S
PLATELET # BLD AUTO: 209 K/UL (ref 150–450)
PMV BLD AUTO: 10.4 FL (ref 9.2–12.9)
POTASSIUM SERPL-SCNC: 4 MMOL/L (ref 3.5–5.1)
PROT SERPL-MCNC: 6.2 GM/DL (ref 6–8.4)
PULM VEIN S/D RATIO: 1.55
PV PEAK D VEL: 0.47 M/S
PV PEAK GRADIENT: 2 MMHG
PV PEAK S VEL: 0.73 M/S
PV PEAK VELOCITY: 0.65 M/S
RA MAJOR: 3.25 CM
RA PRESSURE ESTIMATED: 3 MMHG
RA WIDTH: 2.7 CM
RBC # BLD AUTO: 4.69 M/UL (ref 4–5.4)
RELATIVE EOSINOPHIL (OHS): 4.4 %
RELATIVE LYMPHOCYTE (OHS): 31.7 % (ref 18–48)
RELATIVE MONOCYTE (OHS): 9.9 % (ref 4–15)
RELATIVE NEUTROPHIL (OHS): 53.2 % (ref 38–73)
RIGHT VENTRICLE DIASTOLIC BASEL DIMENSION: 2.8 CM
RIGHT VENTRICULAR END-DIASTOLIC DIMENSION: 2.82 CM
RV TB RVSP: 5 MMHG
RV TISSUE DOPPLER FREE WALL SYSTOLIC VELOCITY 1 (APICAL 4 CHAMBER VIEW): 10.45 CM/S
SINUS: 3.31 CM
SODIUM SERPL-SCNC: 141 MMOL/L (ref 136–145)
STJ: 2.7 CM
SYSTOLIC BLOOD PRESSURE: 181 MMHG
TDI LATERAL: 0.06 M/S
TDI SEPTAL: 0.04 M/S
TDI: 0.05 M/S
TR MAX PG: 20 MMHG
TRICUSPID ANNULAR PLANE SYSTOLIC EXCURSION: 2 CM
TRIGL SERPL-MCNC: 87 MG/DL (ref 30–150)
TROPONIN I SERPL DL<=0.01 NG/ML-MCNC: <0.006 NG/ML
TV REST PULMONARY ARTERY PRESSURE: 22 MMHG
WBC # BLD AUTO: 5.23 K/UL (ref 3.9–12.7)
Z-SCORE OF LEFT VENTRICULAR DIMENSION IN END DIASTOLE: -0.57
Z-SCORE OF LEFT VENTRICULAR DIMENSION IN END SYSTOLE: 0.48

## 2025-04-25 PROCEDURE — 63600175 PHARM REV CODE 636 W HCPCS

## 2025-04-25 PROCEDURE — 99499 UNLISTED E&M SERVICE: CPT | Mod: ,,, | Performed by: INTERNAL MEDICINE

## 2025-04-25 PROCEDURE — 80053 COMPREHEN METABOLIC PANEL: CPT

## 2025-04-25 PROCEDURE — G0378 HOSPITAL OBSERVATION PER HR: HCPCS

## 2025-04-25 PROCEDURE — 25000003 PHARM REV CODE 250: Performed by: NURSE PRACTITIONER

## 2025-04-25 PROCEDURE — 96372 THER/PROPH/DIAG INJ SC/IM: CPT

## 2025-04-25 PROCEDURE — 80061 LIPID PANEL: CPT

## 2025-04-25 PROCEDURE — 96374 THER/PROPH/DIAG INJ IV PUSH: CPT

## 2025-04-25 PROCEDURE — A9502 TC99M TETROFOSMIN: HCPCS

## 2025-04-25 PROCEDURE — 85025 COMPLETE CBC W/AUTO DIFF WBC: CPT

## 2025-04-25 PROCEDURE — 83735 ASSAY OF MAGNESIUM: CPT | Performed by: NURSE PRACTITIONER

## 2025-04-25 PROCEDURE — 63600175 PHARM REV CODE 636 W HCPCS: Performed by: INTERNAL MEDICINE

## 2025-04-25 PROCEDURE — 36415 COLL VENOUS BLD VENIPUNCTURE: CPT

## 2025-04-25 PROCEDURE — 84100 ASSAY OF PHOSPHORUS: CPT

## 2025-04-25 PROCEDURE — 25000003 PHARM REV CODE 250

## 2025-04-25 RX ORDER — REGADENOSON 0.08 MG/ML
0.4 INJECTION, SOLUTION INTRAVENOUS ONCE
Status: COMPLETED | OUTPATIENT
Start: 2025-04-25 | End: 2025-04-25

## 2025-04-25 RX ORDER — CLOPIDOGREL BISULFATE 75 MG/1
75 TABLET ORAL DAILY
Qty: 30 TABLET | Refills: 11 | Status: SHIPPED | OUTPATIENT
Start: 2025-04-26 | End: 2026-04-26

## 2025-04-25 RX ORDER — ASPIRIN 81 MG/1
81 TABLET ORAL DAILY
Qty: 30 TABLET | Refills: 0 | Status: SHIPPED | OUTPATIENT
Start: 2025-04-26 | End: 2026-04-26

## 2025-04-25 RX ORDER — TRAZODONE HYDROCHLORIDE 50 MG/1
50 TABLET ORAL NIGHTLY
Start: 2025-04-25 | End: 2026-04-25

## 2025-04-25 RX ADMIN — METOPROLOL SUCCINATE 50 MG: 50 TABLET, EXTENDED RELEASE ORAL at 08:04

## 2025-04-25 RX ADMIN — ENOXAPARIN SODIUM 40 MG: 40 INJECTION SUBCUTANEOUS at 04:04

## 2025-04-25 RX ADMIN — ATORVASTATIN CALCIUM 80 MG: 40 TABLET, FILM COATED ORAL at 08:04

## 2025-04-25 RX ADMIN — ISOSORBIDE MONONITRATE 60 MG: 30 TABLET, EXTENDED RELEASE ORAL at 08:04

## 2025-04-25 RX ADMIN — TETROFOSMIN 30.3 MILLICURIE: 1.38 INJECTION, POWDER, LYOPHILIZED, FOR SOLUTION INTRAVENOUS at 11:04

## 2025-04-25 RX ADMIN — REGADENOSON 0.4 MG: 0.08 INJECTION, SOLUTION INTRAVENOUS at 12:04

## 2025-04-25 RX ADMIN — CLOPIDOGREL BISULFATE 75 MG: 75 TABLET, FILM COATED ORAL at 08:04

## 2025-04-25 RX ADMIN — ASPIRIN 81 MG: 81 TABLET, COATED ORAL at 08:04

## 2025-04-25 RX ADMIN — COLCHICINE 0.6 MG: 0.6 TABLET ORAL at 08:04

## 2025-04-25 RX ADMIN — THERA TABS 1 TABLET: TAB at 08:04

## 2025-04-25 RX ADMIN — TETROFOSMIN 10.4 MILLICURIE: 1.38 INJECTION, POWDER, LYOPHILIZED, FOR SOLUTION INTRAVENOUS at 10:04

## 2025-04-25 NOTE — H&P
[facility] Medicine  History & Physical    Patient Name: Elizabeth Ayala  MRN: [id number]  Patient Class: OP- Observation  Admission Date: 4/24/2025  Attending Physician: Baldemar Lewis MD   Primary Care Provider: Amna Levy MD         Patient information was obtained from patient, past medical records, and ER records.     Subjective:     Principal Problem:Chest pain    Chief Complaint:   Chief Complaint   Patient presents with    Chest Pain     Pt arrived via ems, pt chief complaint is Chest pain. Pt states has been having cp since 0900 this morning that radiates to left arm. Pt received 324mg asa and 1 spray of nitro, pt states has no pain at this time.         HPI: Elizabeth Ayala is a 79 y.o. with a pmh of hypertension, hyperlipidemia, anxiety/depression, CAD/NSTEMI s/p PCI of RCA and circumflex (03/25/2024, 03/27/2024) presents to the hospital with complaints of substernal pressure-like chest pain with radiation to left arm which lasted for about 20 minutes.  Patient states her left arm felt really heavy during her chest pain episode. Patient was given aspirin 325 mg along with nitro with the alleviation of chest pain.  Patient follows with Dr. Brown (cardiology) and called his office who recommended calling 911 to come to the ED. patient denies any other alleviating exacerbating factors.  Patient denies headache, blurry vision, fever/chills, nausea, vomiting, diarrhea, abdominal pain, hematemesis, hematochezia, hematuria, or any other associated symptoms.    In the ED: Patient afebrile without leukocytosis, CBC and CMP unremarkable, BNP normal, initial troponin normal, chest x-ray shows no acute cardiopulmonary process, EKG shows sinus bradycardia with a first-degree AV block without ischemic changes.  Case discussed with ED provider and patient placed under observation for further medical management.    Past Medical History:   Diagnosis Date    AC (acromioclavicular) joint bone  spurs, unspecified laterality     Coronary artery disease     Depression     Hyperlipidemia     Hypertension     Osteoarthritis     ST elevation (STEMI) myocardial infarction of unspecified site     Vaginal delivery     x2       Past Surgical History:   Procedure Laterality Date    ADENOIDECTOMY      ANGIOGRAM, CORONARY, WITH LEFT HEART CATHETERIZATION N/A 4/3/2024    Procedure: Angiogram, Coronary, with Left Heart Cath;  Surgeon: Cl Rodriguez MD;  Location: [facility] CATH LAB;  Service: Cardiology;  Laterality: N/A;    BREAST SURGERY      biospy    CORONARY STENT PLACEMENT N/A 3/27/2024    Procedure: INSERTION, STENT, CORONARY ARTERY;  Surgeon: Vitaliy Brown MD;  Location: [facility] CATH LAB;  Service: Cardiology;  Laterality: N/A;  PCI circumflex, right common femoral artery access    HYSTERECTOMY      IVUS, CORONARY  3/27/2024    Procedure: IVUS, Coronary;  Surgeon: Vitaliy Brown MD;  Location: [facility] CATH LAB;  Service: Cardiology;;    IVUS, CORONARY  4/3/2024    Procedure: IVUS, Coronary;  Surgeon: Cl Rodriguez MD;  Location: [facility] CATH LAB;  Service: Cardiology;;    JOINT REPLACEMENT Bilateral     knee    LEFT HEART CATHETERIZATION Left 3/25/2024    Procedure: Left heart cath;  Surgeon: Vitaliy Brown MD;  Location: [facility] CATH LAB;  Service: Cardiology;  Laterality: Left;    PERCUTANEOUS TRANSLUMINAL BALLOON ANGIOPLASTY OF CORONARY ARTERY  4/3/2024    Procedure: Angioplasty-coronary;  Surgeon: Cl Rodriguez MD;  Location: [facility] CATH LAB;  Service: Cardiology;;    stent-heart      TONSILLECTOMY      TOTAL KNEE ARTHROPLASTY         Review of patient's allergies indicates:   Allergen Reactions    Effexor [venlafaxine] Hives, Shortness Of Breath and Other (See Comments)     Dry mouth and chest pain    Bactrim [sulfamethoxazole-trimethoprim]     Codeine Itching and Other (See Comments)     Tightness in throat       No current facility-administered medications on file prior to encounter.     Current  Outpatient Medications on File Prior to Encounter   Medication Sig    atenoloL (TENORMIN) 25 MG tablet Take 1 tablet by mouth once daily.    atorvastatin (LIPITOR) 80 MG tablet     colchicine (COLCRYS) 0.6 mg tablet TAKE 1 TABLET BY MOUTH EVERY DAY    isosorbide mononitrate (IMDUR) 60 MG 24 hr tablet Take 1 tablet (60 mg total) by mouth once daily.    metoprolol succinate (TOPROL-XL) 50 MG 24 hr tablet Take 1 tablet (50 mg total) by mouth once daily.    [DISCONTINUED] aspirin (ECOTRIN) 81 MG EC tablet Take 81 mg by mouth once daily.    [DISCONTINUED] clopidogreL (PLAVIX) 75 mg tablet Take 1 tablet (75 mg total) by mouth once daily.    [DISCONTINUED] traZODone (DESYREL) 50 MG tablet Take 50 mg by mouth nightly as needed for Insomnia (1-2 tabs at night as needed).    multivitamin (THERAGRAN) per tablet Take 1 tablet by mouth once daily.    [DISCONTINUED] amLODIPine (NORVASC) 5 MG tablet Take 1 tablet (5 mg total) by mouth once daily. (Patient not taking: Reported on 7/29/2024)    [DISCONTINUED] clonazePAM (KLONOPIN) 1 MG tablet Take 1 tablet (1 mg total) by mouth 2 (two) times daily. (Patient not taking: Reported on 7/29/2024)    [DISCONTINUED] HYDROcodone-acetaminophen (NORCO) 5-325 mg per tablet Take 1 tablet by mouth every 6 (six) hours as needed for Pain. (Patient not taking: Reported on 7/29/2024)    [DISCONTINUED] nitroGLYCERIN (NITROSTAT) 0.4 MG SL tablet Place 1 tablet (0.4 mg total) under the tongue every 5 (five) minutes as needed for Chest pain. (Patient not taking: Reported on 7/29/2024)     Family History       Problem Relation (Age of Onset)    Heart disease Mother, Father          Tobacco Use    Smoking status: Never    Smokeless tobacco: Never   Substance and Sexual Activity    Alcohol use: No    Drug use: No    Sexual activity: Yes     Partners: Male     Review of Systems   Constitutional: Negative.    HENT: Negative.     Respiratory:  Positive for chest tightness.    Cardiovascular:  Positive for  chest pain.   Gastrointestinal: Negative.    Genitourinary: Negative.    Skin: Negative.    Neurological: Negative.    Psychiatric/Behavioral: Negative.       Objective:     Vital Signs (Most Recent):  Temp: 98 °F (36.7 °C) (04/24/25 1841)  Pulse: (!) 56 (04/24/25 1841)  Resp: 19 (04/24/25 1841)  BP: (!) 150/73 (04/24/25 1841)  SpO2: 100 % (04/24/25 1841) Vital Signs (24h Range):  Temp:  [98 °F (36.7 °C)-98.2 °F (36.8 °C)] 98 °F (36.7 °C)  Pulse:  [56-62] 56  Resp:  [16-20] 19  SpO2:  [97 %-100 %] 100 %  BP: (138-177)/(63-73) 150/73     Weight: 62.7 kg (138 lb 3.7 oz)  Body mass index is 27 kg/m².     Physical Exam  Constitutional:       General: She is not in acute distress.     Appearance: Normal appearance. She is not ill-appearing.   HENT:      Head: Normocephalic and atraumatic.      Mouth/Throat:      Mouth: Mucous membranes are moist.   Eyes:      Extraocular Movements: Extraocular movements intact.   Cardiovascular:      Rate and Rhythm: Regular rhythm. Bradycardia present.      Pulses: Normal pulses.   Pulmonary:      Effort: Pulmonary effort is normal. No respiratory distress.      Comments: Speaking in full sentences on RA  Abdominal:      General: Abdomen is flat.      Palpations: Abdomen is soft.   Musculoskeletal:      Right lower leg: No edema.      Left lower leg: No edema.   Skin:     General: Skin is warm.   Neurological:      General: No focal deficit present.      Mental Status: She is alert and oriented to person, place, and time. Mental status is at baseline.   Psychiatric:         Mood and Affect: Mood normal.         Behavior: Behavior normal.         Thought Content: Thought content normal.                Significant Labs: All pertinent labs within the past 24 hours have been reviewed.    Significant Imaging: I have reviewed all pertinent imaging results/findings within the past 24 hours.  Imaging Results              X-Ray Chest AP Portable (Final result)  Result time 04/24/25 16:45:27       Final result by Reinier Gruber MD (04/24/25 16:45:27)                   Impression:      No acute cardiopulmonary disease.  No detrimental interval change.      Electronically signed by: Reinier Gruber MD  Date:    04/24/2025  Time:    16:45               Narrative:    EXAMINATION:  XR CHEST AP PORTABLE    CLINICAL HISTORY:  Chest Pain;    TECHNIQUE:  Single frontal view of the chest was performed.    COMPARISON:  07/04/2024    FINDINGS:  Heart size and pulmonary vascularity are within normal limits.  Mild tortuosity of the thoracic aorta with atherosclerotic calcification in the wall of the aortic arch.  Lungs are satisfactorily expanded.  Slight elevation of the right hemidiaphragm.  No airspace consolidation or pleural effusion.  No pneumothorax.  Skeletal structures appear intact.                                      Assessment/Plan:     Assessment & Plan  Chest pain  Patient presents with substernal select chest pain with radiation to the left arm which lasted about 20 minutes earlier this morning.  Patient was recommended to come to the ED and EN route was given nitroglycerin and aspirin with resolution and pain.  Given patient's concerning history of CAD s/p PCI with stents and history of STEMI, patient placed under observation for further medical management and cardiology consultation.  BNP normal, and initial troponin normal.    Plan:  Continuous cardiac monitoring  Trend troponin   Continue aspirin/plavix/statin  Lipid panel ordered  NPO after midnight  Echocardiogram ordered  Cardiology consulted and would appreciate recommendations  Essential hypertension, benign  Patient's blood pressure range in the last 24 hours was: BP  Min: 138/65  Max: 177/73.The patient's inpatient anti-hypertensive regimen is listed below:  Current Antihypertensives  nitroGLYCERIN SL tablet 0.4 mg, Every 5 min PRN, Sublingual  atenoloL tablet 25 mg, Daily, Oral  isosorbide mononitrate 24 hr tablet 60 mg, Daily, Oral  metoprolol  succinate (TOPROL-XL) 24 hr tablet 50 mg, Daily, Oral    Plan  - BP is controlled, no changes needed to their regimen with parameters    Coronary artery disease involving native coronary artery of native heart with unstable angina pectoris  Patient with known CAD s/p stent placement, which is controlled Will continue ASA, Plavix, and Statin and monitor for S/Sx of angina/ACS. Continue to monitor on telemetry.   Mixed hyperlipidemia  Continue statin  Dressler's syndrome  History noted of pericarditis post MI  Continue colchicine  VTE Risk Mitigation (From admission, onward)           Ordered     enoxaparin injection 40 mg  Daily         04/24/25 1750     IP VTE HIGH RISK PATIENT  Once         04/24/25 1750     Place sequential compression device  Until discontinued         04/24/25 1750                         On 04/24/2025, patient should be placed in hospital observation services under my care in collaboration with Baldemar Lewis MD.           Yissel Lewis PA-C  Department of Hospital Medicine  [facility] - Telemetry

## 2025-04-25 NOTE — SUBJECTIVE & OBJECTIVE
Past Medical History:   Diagnosis Date    AC (acromioclavicular) joint bone spurs, unspecified laterality     Coronary artery disease     Depression     Hyperlipidemia     Hypertension     Osteoarthritis     ST elevation (STEMI) myocardial infarction of unspecified site     Vaginal delivery     x2       Past Surgical History:   Procedure Laterality Date    ADENOIDECTOMY      ANGIOGRAM, CORONARY, WITH LEFT HEART CATHETERIZATION N/A 4/3/2024    Procedure: Angiogram, Coronary, with Left Heart Cath;  Surgeon: Cl Rodriguez MD;  Location: [facility] CATH LAB;  Service: Cardiology;  Laterality: N/A;    BREAST SURGERY      biospy    CORONARY STENT PLACEMENT N/A 3/27/2024    Procedure: INSERTION, STENT, CORONARY ARTERY;  Surgeon: Vitaliy Brown MD;  Location: [facility] CATH LAB;  Service: Cardiology;  Laterality: N/A;  PCI circumflex, right common femoral artery access    HYSTERECTOMY      IVUS, CORONARY  3/27/2024    Procedure: IVUS, Coronary;  Surgeon: Vitaliy Brown MD;  Location: [facility] CATH LAB;  Service: Cardiology;;    IVUS, CORONARY  4/3/2024    Procedure: IVUS, Coronary;  Surgeon: Cl Rodriguez MD;  Location: [facility] CATH LAB;  Service: Cardiology;;    JOINT REPLACEMENT Bilateral     knee    LEFT HEART CATHETERIZATION Left 3/25/2024    Procedure: Left heart cath;  Surgeon: Vitaliy Brown MD;  Location: [facility] CATH LAB;  Service: Cardiology;  Laterality: Left;    PERCUTANEOUS TRANSLUMINAL BALLOON ANGIOPLASTY OF CORONARY ARTERY  4/3/2024    Procedure: Angioplasty-coronary;  Surgeon: Cl Rodriguez MD;  Location: [facility] CATH LAB;  Service: Cardiology;;    stent-heart      TONSILLECTOMY      TOTAL KNEE ARTHROPLASTY         Review of patient's allergies indicates:   Allergen Reactions    Effexor [venlafaxine] Hives, Shortness Of Breath and Other (See Comments)     Dry mouth and chest pain    Bactrim [sulfamethoxazole-trimethoprim]     Codeine Itching and Other (See Comments)     Tightness in throat       No  current facility-administered medications on file prior to encounter.     Current Outpatient Medications on File Prior to Encounter   Medication Sig    atenoloL (TENORMIN) 25 MG tablet Take 1 tablet by mouth once daily.    atorvastatin (LIPITOR) 80 MG tablet     colchicine (COLCRYS) 0.6 mg tablet TAKE 1 TABLET BY MOUTH EVERY DAY    isosorbide mononitrate (IMDUR) 60 MG 24 hr tablet Take 1 tablet (60 mg total) by mouth once daily.    metoprolol succinate (TOPROL-XL) 50 MG 24 hr tablet Take 1 tablet (50 mg total) by mouth once daily.    [DISCONTINUED] aspirin (ECOTRIN) 81 MG EC tablet Take 81 mg by mouth once daily.    [DISCONTINUED] clopidogreL (PLAVIX) 75 mg tablet Take 1 tablet (75 mg total) by mouth once daily.    [DISCONTINUED] traZODone (DESYREL) 50 MG tablet Take 50 mg by mouth nightly as needed for Insomnia (1-2 tabs at night as needed).    multivitamin (THERAGRAN) per tablet Take 1 tablet by mouth once daily.    [DISCONTINUED] amLODIPine (NORVASC) 5 MG tablet Take 1 tablet (5 mg total) by mouth once daily. (Patient not taking: Reported on 7/29/2024)    [DISCONTINUED] clonazePAM (KLONOPIN) 1 MG tablet Take 1 tablet (1 mg total) by mouth 2 (two) times daily. (Patient not taking: Reported on 7/29/2024)    [DISCONTINUED] HYDROcodone-acetaminophen (NORCO) 5-325 mg per tablet Take 1 tablet by mouth every 6 (six) hours as needed for Pain. (Patient not taking: Reported on 7/29/2024)    [DISCONTINUED] nitroGLYCERIN (NITROSTAT) 0.4 MG SL tablet Place 1 tablet (0.4 mg total) under the tongue every 5 (five) minutes as needed for Chest pain. (Patient not taking: Reported on 7/29/2024)     Family History       Problem Relation (Age of Onset)    Heart disease Mother, Father          Tobacco Use    Smoking status: Never    Smokeless tobacco: Never   Substance and Sexual Activity    Alcohol use: No    Drug use: No    Sexual activity: Yes     Partners: Male     Review of Systems   Constitutional: Negative.    HENT: Negative.      Respiratory:  Positive for chest tightness.    Cardiovascular:  Positive for chest pain.   Gastrointestinal: Negative.    Genitourinary: Negative.    Skin: Negative.    Neurological: Negative.    Psychiatric/Behavioral: Negative.       Objective:     Vital Signs (Most Recent):  Temp: 98 °F (36.7 °C) (04/24/25 1841)  Pulse: (!) 56 (04/24/25 1841)  Resp: 19 (04/24/25 1841)  BP: (!) 150/73 (04/24/25 1841)  SpO2: 100 % (04/24/25 1841) Vital Signs (24h Range):  Temp:  [98 °F (36.7 °C)-98.2 °F (36.8 °C)] 98 °F (36.7 °C)  Pulse:  [56-62] 56  Resp:  [16-20] 19  SpO2:  [97 %-100 %] 100 %  BP: (138-177)/(63-73) 150/73     Weight: 62.7 kg (138 lb 3.7 oz)  Body mass index is 27 kg/m².     Physical Exam  Constitutional:       General: She is not in acute distress.     Appearance: Normal appearance. She is not ill-appearing.   HENT:      Head: Normocephalic and atraumatic.      Mouth/Throat:      Mouth: Mucous membranes are moist.   Eyes:      Extraocular Movements: Extraocular movements intact.   Cardiovascular:      Rate and Rhythm: Regular rhythm. Bradycardia present.      Pulses: Normal pulses.   Pulmonary:      Effort: Pulmonary effort is normal. No respiratory distress.      Comments: Speaking in full sentences on RA  Abdominal:      General: Abdomen is flat.      Palpations: Abdomen is soft.   Musculoskeletal:      Right lower leg: No edema.      Left lower leg: No edema.   Skin:     General: Skin is warm.   Neurological:      General: No focal deficit present.      Mental Status: She is alert and oriented to person, place, and time. Mental status is at baseline.   Psychiatric:         Mood and Affect: Mood normal.         Behavior: Behavior normal.         Thought Content: Thought content normal.                Significant Labs: All pertinent labs within the past 24 hours have been reviewed.    Significant Imaging: I have reviewed all pertinent imaging results/findings within the past 24 hours.  Imaging Results               X-Ray Chest AP Portable (Final result)  Result time 04/24/25 16:45:27      Final result by Reniier Gruber MD (04/24/25 16:45:27)                   Impression:      No acute cardiopulmonary disease.  No detrimental interval change.      Electronically signed by: Reinier Gruber MD  Date:    04/24/2025  Time:    16:45               Narrative:    EXAMINATION:  XR CHEST AP PORTABLE    CLINICAL HISTORY:  Chest Pain;    TECHNIQUE:  Single frontal view of the chest was performed.    COMPARISON:  07/04/2024    FINDINGS:  Heart size and pulmonary vascularity are within normal limits.  Mild tortuosity of the thoracic aorta with atherosclerotic calcification in the wall of the aortic arch.  Lungs are satisfactorily expanded.  Slight elevation of the right hemidiaphragm.  No airspace consolidation or pleural effusion.  No pneumothorax.  Skeletal structures appear intact.

## 2025-04-25 NOTE — ASSESSMENT & PLAN NOTE
Patient's blood pressure range in the last 24 hours was: BP  Min: 143/59  Max: 176/72.The patient's inpatient anti-hypertensive regimen is listed below:  Current Antihypertensives  nitroGLYCERIN SL tablet 0.4 mg, Every 5 min PRN, Sublingual  isosorbide mononitrate 24 hr tablet 60 mg, Daily, Oral  metoprolol succinate (TOPROL-XL) 24 hr tablet 50 mg, Daily, Oral    Plan  - BP is controlled, no changes needed to their regimen with parameters

## 2025-04-25 NOTE — PLAN OF CARE
04/25/25 1032   Final Note   Assessment Type Final Discharge Note   Anticipated Discharge Disposition Home   Hospital Resources/Appts/Education Provided Appointments scheduled and added to AVS   Post-Acute Status   Post-Acute Authorization Other   Other Status No Post-Acute Service Needs     Pts nurse Jasmyn notified that the pt can d/c from CM standpoint

## 2025-04-25 NOTE — ASSESSMENT & PLAN NOTE
Patient presents with substernal select chest pain with radiation to the left arm which lasted about 20 minutes earlier this morning.  Patient was recommended to come to the ED and EN route was given nitroglycerin and aspirin with resolution and pain.  Given patient's concerning history of CAD s/p PCI with stents and history of STEMI, patient placed under observation for further medical management and cardiology consultation.  BNP normal, and initial troponin normal.    Plan:  Continuous cardiac monitoring  Trend troponin   Continue aspirin/plavix/statin  Lipid panel ordered  NPO after midnight  Echocardiogram ordered  Cardiology consulted and would appreciate recommendations

## 2025-04-25 NOTE — HOSPITAL COURSE
Admission for chest pain similar to previous MI. EKG and troponin negative. 2 D echo normal EF, grade I DD, normal wall motion. NST no evidence of ischemia. No chest pain during observation stay.   Continue ASA, plavix, statin. Follow up with Dr. Brown

## 2025-04-25 NOTE — PLAN OF CARE
04/25/25 1015   Discharge Planning   Assessment Type Discharge Planning Brief Assessment   Resource/Environmental Concerns none   Support Systems Family members;Children   Equipment Currently Used at Home walker, standard;cane, straight   Current Living Arrangements home   Patient/Family Anticipates Transition to home with family   Patient/Family Anticipated Services at Transition none   DME Needed Upon Discharge  none   Discharge Plan A Home with family  (with instructions to follow up)       [id number] - [place] - [id number]  [address] [address]  Phone: [phone] Fax: [phone]

## 2025-04-25 NOTE — DISCHARGE SUMMARY
[facility] Medicine  Discharge Summary      Patient Name: Elizabeth Ayala  MRN: [id number]  SADI: [id number]  Patient Class: OP- Observation  Admission Date: 4/24/2025  Hospital Length of Stay: 0 days  Discharge Date and Time: No discharge date for patient encounter.  Attending Physician: Baldemar Lewis MD   Discharging Provider: Ayleen Braga NP  Primary Care Provider: Amna Levy MD    Primary Care Team: AYLEEN BRAGA    HPI:   Elizabeth Ayala is a 79 y.o. with a pmh of hypertension, hyperlipidemia, anxiety/depression, CAD/NSTEMI s/p PCI of RCA and circumflex (03/25/2024, 03/27/2024) presents to the hospital with complaints of substernal pressure-like chest pain with radiation to left arm which lasted for about 20 minutes.  Patient states her left arm felt really heavy during her chest pain episode. Patient was given aspirin 325 mg along with nitro with the alleviation of chest pain.  Patient follows with Dr. Brown (cardiology) and called his office who recommended calling 911 to come to the ED. patient denies any other alleviating exacerbating factors.  Patient denies headache, blurry vision, fever/chills, nausea, vomiting, diarrhea, abdominal pain, hematemesis, hematochezia, hematuria, or any other associated symptoms.    In the ED: Patient afebrile without leukocytosis, CBC and CMP unremarkable, BNP normal, initial troponin normal, chest x-ray shows no acute cardiopulmonary process, EKG shows sinus bradycardia with a first-degree AV block without ischemic changes.  Case discussed with ED provider and patient placed under observation for further medical management.    * No surgery found *      Hospital Course:   Admission for chest pain similar to previous MI. EKG and troponin negative. 2 D echo normal EF, grade I DD, normal wall motion. NST no evidence of ischemia. No chest pain during observation stay.   Continue ASA, plavix, statin. Follow up with Dr. Brown      Goals of Care  Treatment Preferences:  Code Status: Full Code      SDOH Screening:  The patient was screened for utility difficulties, food insecurity, transport difficulties, housing insecurity, and interpersonal safety and there were no concerns identified this admission.     Consults:   Consults (From admission, onward)          Status Ordering Provider     Inpatient consult to Cardiology  Once        Provider:  Rony Rubin MD    Acknowledged MATTHEW OWEN            Assessment & Plan  Chest pain  Patient presents with substernal select chest pain with radiation to the left arm which lasted about 20 minutes earlier this morning.  Patient was recommended to come to the ED and EN route was given nitroglycerin and aspirin with resolution and pain.  Given patient's concerning history of CAD s/p PCI with stents and history of STEMI, patient placed under observation for further medical management and cardiology consultation.  BNP normal, and initial troponin normal.    Plan:  Continuous cardiac monitoring  Trend troponin   Continue aspirin/plavix/statin  Lipid panel ordered  NPO after midnight  Echocardiogram ordered  Cardiology consulted and would appreciate recommendations  Essential hypertension, benign  Patient's blood pressure range in the last 24 hours was: BP  Min: 143/59  Max: 176/72.The patient's inpatient anti-hypertensive regimen is listed below:  Current Antihypertensives  nitroGLYCERIN SL tablet 0.4 mg, Every 5 min PRN, Sublingual  isosorbide mononitrate 24 hr tablet 60 mg, Daily, Oral  metoprolol succinate (TOPROL-XL) 24 hr tablet 50 mg, Daily, Oral    Plan  - BP is controlled, no changes needed to their regimen with parameters    Coronary artery disease involving native coronary artery of native heart with unstable angina pectoris  Patient with known CAD s/p stent placement, which is controlled Will continue ASA, Plavix, and Statin and monitor for S/Sx of angina/ACS. Continue to monitor on telemetry.   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    Specialties: General Practice, Internal Medicine  Contact information:  [address] [place] [address]  [address]  [facility] [id number] [phone]               Vitaliy Brown MD Follow up.    Specialties: Cardiology, Interventional Cardiology  Contact information:  [address]  [address]  [place] [id number] [phone]                           Patient Instructions:      Diet Cardiac     Notify your health care provider if you experience any of the following:  temperature >100.4     Notify your health care provider if you experience any of the following:  difficulty breathing or increased cough     Notify your health care provider if you experience any of the following:  increased confusion or weakness     Activity as tolerated       Significant Diagnostic Studies: N/A    Pending Diagnostic Studies:       None           Medications:  Reconciled Home Medications:      Medication List        START taking these medications      aspirin 81 MG EC tablet  Commonly known as: ECOTRIN  Take 1 tablet (81 mg total) by mouth once daily.  Start taking on: April 26, 2025     clopidogreL 75 mg tablet  Commonly known as: PLAVIX  Take 1 tablet (75 mg total) by mouth once daily.  Start taking on: April 26, 2025     traZODone 50 MG  tablet  Commonly known as: DESYREL  Take 1 tablet (50 mg total) by mouth every evening.            CONTINUE taking these medications      atorvastatin 80 MG tablet  Commonly known as: LIPITOR     colchicine 0.6 mg tablet  Commonly known as: COLCRYS  TAKE 1 TABLET BY MOUTH EVERY DAY     isosorbide mononitrate 60 MG 24 hr tablet  Commonly known as: IMDUR  Take 1 tablet (60 mg total) by mouth once daily.     metoprolol succinate 50 MG 24 hr tablet  Commonly known as: TOPROL-XL  Take 1 tablet (50 mg total) by mouth once daily.     multivitamin per tablet  Commonly known as: THERAGRAN  Take 1 tablet by mouth once daily.            STOP taking these medications      atenoloL 25 MG tablet  Commonly known as: TENORMIN              Indwelling Lines/Drains at time of discharge:   Lines/Drains/Airways       None                   Time spent on the discharge of patient: 35 minutes         Ayleen Rodas NP  Department of Hospital Medicine  [facility] [place] [facility]

## 2025-04-25 NOTE — ASSESSMENT & PLAN NOTE
Patient's blood pressure range in the last 24 hours was: BP  Min: 138/65  Max: 177/73.The patient's inpatient anti-hypertensive regimen is listed below:  Current Antihypertensives  nitroGLYCERIN SL tablet 0.4 mg, Every 5 min PRN, Sublingual  atenoloL tablet 25 mg, Daily, Oral  isosorbide mononitrate 24 hr tablet 60 mg, Daily, Oral  metoprolol succinate (TOPROL-XL) 24 hr tablet 50 mg, Daily, Oral    Plan  - BP is controlled, no changes needed to their regimen with parameters

## 2025-04-25 NOTE — HPI
Elizabeth Ayala is a 79 y.o. with a pmh of hypertension, hyperlipidemia, anxiety/depression, CAD/NSTEMI s/p PCI of RCA and circumflex (03/25/2024, 03/27/2024) presents to the hospital with complaints of substernal pressure-like chest pain with radiation to left arm which lasted for about 20 minutes.  Patient states her left arm felt really heavy during her chest pain episode. Patient was given aspirin 325 mg along with nitro with the alleviation of chest pain.  Patient follows with Dr. Brown (cardiology) and called his office who recommended calling 911 to come to the ED. patient denies any other alleviating exacerbating factors.  Patient denies headache, blurry vision, fever/chills, nausea, vomiting, diarrhea, abdominal pain, hematemesis, hematochezia, hematuria, or any other associated symptoms.    In the ED: Patient afebrile without leukocytosis, CBC and CMP unremarkable, BNP normal, initial troponin normal, chest x-ray shows no acute cardiopulmonary process, EKG shows sinus bradycardia with a first-degree AV block without ischemic changes.  Case discussed with ED provider and patient placed under observation for further medical management.

## 2025-04-26 NOTE — HPI
Patient well known to me from Cardiology Clinic.  Came in with chest pain.  Stated mostly left arm pain with some radiation to the chest.  Not as bad as the pain she had prior to her PCIs.  Currently chest pain-free.  Troponins negative.  Discussed various options with the patient including repeating a stress test versus doing coronary angiography for further evaluation.  After detailed discussion, patient stated that she is currently chest pain-free and would prefer a stress test for further evaluation.  Stress test done which did not show any significant ischemia.

## 2025-04-26 NOTE — CONSULTS
[facility] - Telemetry  Cardiology  Consult Note    Patient Name: Elizabeth Ayala  MRN: [id number]  Admission Date: 4/24/2025  Hospital Length of Stay: 0 days  Code Status: Prior   Attending Provider: No att. providers found   Consulting Provider: Vitaliy Brown MD  Primary Care Physician: Amna Levy MD  Principal Problem:Chest pain    Patient information was obtained from patient and ER records.     Consults  Subjective:     Chief Complaint:  cp     HPI:   Patient well known to me from Cardiology Clinic.  Came in with chest pain.  Stated mostly left arm pain with some radiation to the chest.  Not as bad as the pain she had prior to her PCIs.  Currently chest pain-free.  Troponins negative.  Discussed various options with the patient including repeating a stress test versus doing coronary angiography for further evaluation.  After detailed discussion, patient stated that she is currently chest pain-free and would prefer a stress test for further evaluation.  Stress test done which did not show any significant ischemia.    Past Medical History:   Diagnosis Date    AC (acromioclavicular) joint bone spurs, unspecified laterality     Coronary artery disease     Depression     Hyperlipidemia     Hypertension     Osteoarthritis     ST elevation (STEMI) myocardial infarction of unspecified site     Vaginal delivery     x2       Past Surgical History:   Procedure Laterality Date    ADENOIDECTOMY      ANGIOGRAM, CORONARY, WITH LEFT HEART CATHETERIZATION N/A 4/3/2024    Procedure: Angiogram, Coronary, with Left Heart Cath;  Surgeon: Cl Rodriguez MD;  Location: [facility] CATH LAB;  Service: Cardiology;  Laterality: N/A;    BREAST SURGERY      biospy    CORONARY STENT PLACEMENT N/A 3/27/2024    Procedure: INSERTION, STENT, CORONARY ARTERY;  Surgeon: Vitaliy Brown MD;  Location: [facility] CATH LAB;  Service: Cardiology;  Laterality: N/A;  PCI circumflex, right common femoral artery access    HYSTERECTOMY      IVUS, CORONARY   3/27/2024    Procedure: IVUS, Coronary;  Surgeon: Vitaliy Brown MD;  Location: [facility] CATH LAB;  Service: Cardiology;;    IVUS, CORONARY  4/3/2024    Procedure: IVUS, Coronary;  Surgeon: Cl Rodriguez MD;  Location: [facility] CATH LAB;  Service: Cardiology;;    JOINT REPLACEMENT Bilateral     knee    LEFT HEART CATHETERIZATION Left 3/25/2024    Procedure: Left heart cath;  Surgeon: Vitaliy Brown MD;  Location: [facility] CATH LAB;  Service: Cardiology;  Laterality: Left;    PERCUTANEOUS TRANSLUMINAL BALLOON ANGIOPLASTY OF CORONARY ARTERY  4/3/2024    Procedure: Angioplasty-coronary;  Surgeon: Cl Rodriguez MD;  Location: [facility] CATH LAB;  Service: Cardiology;;    stent-heart      TONSILLECTOMY      TOTAL KNEE ARTHROPLASTY         Review of patient's allergies indicates:   Allergen Reactions    Effexor [venlafaxine] Hives, Shortness Of Breath and Other (See Comments)     Dry mouth and chest pain    Bactrim [sulfamethoxazole-trimethoprim]     Codeine Itching and Other (See Comments)     Tightness in throat       No current facility-administered medications on file prior to encounter.     Current Outpatient Medications on File Prior to Encounter   Medication Sig    atorvastatin (LIPITOR) 80 MG tablet     colchicine (COLCRYS) 0.6 mg tablet TAKE 1 TABLET BY MOUTH EVERY DAY    isosorbide mononitrate (IMDUR) 60 MG 24 hr tablet Take 1 tablet (60 mg total) by mouth once daily.    metoprolol succinate (TOPROL-XL) 50 MG 24 hr tablet Take 1 tablet (50 mg total) by mouth once daily.    multivitamin (THERAGRAN) per tablet Take 1 tablet by mouth once daily.     Family History       Problem Relation (Age of Onset)    Heart disease Mother, Father          Tobacco Use    Smoking status: Never    Smokeless tobacco: Never   Substance and Sexual Activity    Alcohol use: No    Drug use: No    Sexual activity: Yes     Partners: Male     Review of Systems   Constitutional: Negative.   HENT: Negative.     Eyes: Negative.     Cardiovascular:  Positive for chest pain.   Respiratory: Negative.     Endocrine: Negative.    Hematologic/Lymphatic: Negative.    Skin: Negative.    Musculoskeletal: Negative.    Gastrointestinal: Negative.    Genitourinary: Negative.    Neurological: Negative.    Psychiatric/Behavioral: Negative.     Allergic/Immunologic: Negative.      Objective:     Vital Signs (Most Recent):  Temp: 98.1 °F (36.7 °C) (04/25/25 1526)  Pulse: 63 (04/25/25 1526)  Resp: 18 (04/25/25 1526)  BP: (!) 154/68 (04/25/25 1526)  SpO2: 98 % (04/25/25 1526) Vital Signs (24h Range):  Temp:  [97.6 °F (36.4 °C)-98.1 °F (36.7 °C)] 98.1 °F (36.7 °C)  Pulse:  [51-63] 63  Resp:  [17-18] 18  SpO2:  [97 %-99 %] 98 %  BP: (143-176)/(59-72) 154/68     Weight: 62.7 kg (138 lb 3.7 oz)  Body mass index is 27 kg/m².    SpO2: 98 %         Intake/Output Summary (Last 24 hours) at 4/26/2025 0027  Last data filed at 4/25/2025 0945  Gross per 24 hour   Intake 120 ml   Output --   Net 120 ml       Lines/Drains/Airways       None                    Physical Exam  Constitutional:       Appearance: Normal appearance. She is well-developed.   HENT:      Head: Normocephalic.   Eyes:      Pupils: Pupils are equal, round, and reactive to light.   Cardiovascular:      Rate and Rhythm: Normal rate and regular rhythm.   Pulmonary:      Effort: Pulmonary effort is normal.      Breath sounds: Normal breath sounds.   Abdominal:      General: Bowel sounds are normal.      Palpations: Abdomen is soft.      Tenderness: There is no abdominal tenderness.   Musculoskeletal:         General: Normal range of motion.      Cervical back: Normal range of motion and neck supple.   Skin:     General: Skin is warm.   Neurological:      Mental Status: She is alert and oriented to person, place, and time.          Significant Labs:     DATA:     Laboratory:  CBC:  Recent Labs   Lab 07/04/24  1024 04/24/25  1552 04/25/25  0421   WBC 4.96 5.60 5.23   Hemoglobin 13.3  --   --    HGB  --  12.6  12.7   Hematocrit 37.3  --   --    HCT  --  38.6 39.4   Platelet Count  --  229 209   Platelets 205  --   --        CHEMISTRIES:  Recent Labs   Lab 07/04/24  1024 09/26/24  1140 01/16/25 0820 04/24/25 1552 04/25/25  0421   Glucose 105 103 H 106 H  --   --    Sodium 138 139 141 141 141   Potassium 3.7 4.0 4.2 4.1 4.0   BUN 22  --   --  21 20   Blood Urea Nitrogen  --  18 19  --   --    Creatinine 0.7 0.73 0.64 0.7 0.7   Calcium 9.2 9.8 9.5 9.2 8.7   Magnesium   --   --   --   --  2.0       CARDIAC BIOMARKERS:  Recent Labs   Lab 04/24/25  1552 04/24/25 2000 04/24/25  2336   Troponin-I <0.006 <0.006 <0.006       COAGS:  Recent Labs   Lab 03/26/24  1912 04/03/24 2031 07/04/24  1024   INR 1.0 1.0 1.1       LIPIDS/LFTS:  Recent Labs   Lab 01/16/25  0820 04/24/25  1552 04/25/25  0421   Cholesterol Total  --   --  128   Triglyceride  --   --  87   HDL Cholesterol  --   --  52   Non HDL Cholesterol  --   --  76   AST 22 22 19   ALT 22 21 21       Hemoglobin A1C   Date Value Ref Range Status   12/11/2007 5.5 4.5 - 6.2 % Final     Hemoglobin A1c   Date Value Ref Range Status   03/25/2021 4.9 <5.7 % of total Hgb Final     Comment:     For the purpose of screening for the presence of  diabetes:    <5.7%       Consistent with the absence of diabetes  5.7-6.4%    Consistent with increased risk for diabetes              (prediabetes)  > or =6.5%  Consistent with diabetes    This assay result is consistent with a decreased risk  of diabetes.    Currently, no consensus exists regarding use of  hemoglobin A1c for diagnosis of diabetes in children.    According to American Diabetes Association (ADA)  guidelines, hemoglobin A1c <7.0% represents optimal  control in non-pregnant diabetic patients. Different  metrics may apply to specific patient populations.   Standards of Medical Care in Diabetes(ADA).         % HEMOGLOBIN A1C   Date Value Ref Range Status   11/08/2022 4.9 <5.7 % of total Hgb Final     Comment:     For the purpose of  screening for the presence of  diabetes:    <5.7%       Consistent with the absence of diabetes  5.7-6.4%    Consistent with increased risk for diabetes              (prediabetes)  > or =6.5%  Consistent with diabetes    This assay result is consistent with a decreased risk  of diabetes.    Currently, no consensus exists regarding use of  hemoglobin A1c for diagnosis of diabetes in children.    According to American Diabetes Association (ADA)  guidelines, hemoglobin A1c <7.0% represents optimal  control in non-pregnant diabetic patients. Different  metrics may apply to specific patient populations.   Standards of Medical Care in Diabetes(ADA).           TSH  Recent Labs   Lab 04/24/25  1552   TSH 2.333       The ASCVD Risk score (Zack VALENCIA, et al., 2019) failed to calculate for the following reasons:    The 2019 ASCVD risk score is only valid for ages 40 to 79    Risk score cannot be calculated because patient has a medical history suggesting prior/existing ASCVD       BNP    Lab Results   Component Value Date/Time    BNP 61 04/24/2025 03:52 PM    BNP 43 04/03/2024 08:31 PM     (H) 03/25/2024 11:09 AM    BNP 85 04/29/2015 03:31 PM            ECHO    Results for orders placed during the hospital encounter of 04/24/25    Echo    Interpretation Summary    Left Ventricle: The left ventricle is normal in size. Mildly increased wall thickness. There is concentric hypertrophy. There is normal systolic function. Grade I diastolic dysfunction.    Right Ventricle: The right ventricle is normal in size. Systolic function is normal.    Left Atrium: Mildly dilated    Aortic Valve: There is mild aortic regurgitation.    Mitral Valve: There is mild regurgitation.    Pulmonary Artery: The estimated pulmonary artery systolic pressure is 22 mmHg.    IVC/SVC: Normal venous pressure at 3 mmHg.      STRESS TEST    Results for orders placed during the hospital encounter of 04/24/25    Nuclear Stress - Cardiology  Interpreted    Interpretation Summary    Normal myocardial perfusion scan. There is no evidence of myocardial ischemia or infarction.    There is a mild intensity perfusion abnormality in the anteroapical wall of the left ventricle, secondary to breast attenuation.    The gated perfusion images showed an ejection fraction of 70% at rest.    There is normal wall motion at rest and post-stress.    The ECG portion of the study is negative for ischemia.    The patient reported no chest pain during the stress test.    There were no arrhythmias during stress.        CATH    Results for orders placed during the hospital encounter of 04/03/24    Cardiac catheterization    Conclusion  Description of the findings of the procedure: uneventful LHC/cor angio/IVUS guided PCI mid RCA AST (POBA)/R rad vasband.    Findings/Key Components:  LVEDP: 5mmHg  LVEF: echo ordered, prev normal LV fxn    Dominance: Right  LM: normal  LAD: mid diffuse 90% stenosis, small caliber distal vessel  LCx: patent prox-mid stents (placed 3/27/24)  RCA: prox-mid stents thrombotic occlusion (placed 3/25/24), faint antonio to RPDA from LAD.    PCI prox-mid RCA AST:  Preop ASA/Plavix, intra-op heparin  Predil 2.5x20 POBA  IVUS for assessment of stents.  ?underexpansion of distal stent edge.  POBA 2.75x30 NC 20 domingo  Post IVUS with excellent stent expansion and apposition, no dissections  Excellent angiographic result, T3 flow, 0% residual stenosis    Hemostasis:  R Radial band      Assessment and Plan:     * Chest pain  Discussed various options including repeat angiography versus stress testing with the patient.  Patient chose stress testing and would like to avoid angiography is possible.  Stress test did not show any significant ischemia.  Maximize medical management.  Follow-up in Cardiology Clinic.    Mixed hyperlipidemia               VTE Risk Mitigation (From admission, onward)           Ordered     IP VTE HIGH RISK PATIENT  Once         04/24/25 1750                     Thank you for your consult. I will follow-up with patient. Please contact us if you have any additional questions.    Vitaliy Brown MD  Cardiology   [facility] [place] [facility] [place] [facility]

## 2025-04-26 NOTE — SUBJECTIVE & OBJECTIVE
Past Medical History:   Diagnosis Date    AC (acromioclavicular) joint bone spurs, unspecified laterality     Coronary artery disease     Depression     Hyperlipidemia     Hypertension     Osteoarthritis     ST elevation (STEMI) myocardial infarction of unspecified site     Vaginal delivery     x2       Past Surgical History:   Procedure Laterality Date    ADENOIDECTOMY      ANGIOGRAM, CORONARY, WITH LEFT HEART CATHETERIZATION N/A 4/3/2024    Procedure: Angiogram, Coronary, with Left Heart Cath;  Surgeon: Cl Rodriguez MD;  Location: [facility] CATH LAB;  Service: Cardiology;  Laterality: N/A;    BREAST SURGERY      biospy    CORONARY STENT PLACEMENT N/A 3/27/2024    Procedure: INSERTION, STENT, CORONARY ARTERY;  Surgeon: Vitaliy Brown MD;  Location: [facility] CATH LAB;  Service: Cardiology;  Laterality: N/A;  PCI circumflex, right common femoral artery access    HYSTERECTOMY      IVUS, CORONARY  3/27/2024    Procedure: IVUS, Coronary;  Surgeon: Vitaliy Brown MD;  Location: [facility] CATH LAB;  Service: Cardiology;;    IVUS, CORONARY  4/3/2024    Procedure: IVUS, Coronary;  Surgeon: Cl Rodriguez MD;  Location: [facility] CATH LAB;  Service: Cardiology;;    JOINT REPLACEMENT Bilateral     knee    LEFT HEART CATHETERIZATION Left 3/25/2024    Procedure: Left heart cath;  Surgeon: Vitaliy Brown MD;  Location: [facility] CATH LAB;  Service: Cardiology;  Laterality: Left;    PERCUTANEOUS TRANSLUMINAL BALLOON ANGIOPLASTY OF CORONARY ARTERY  4/3/2024    Procedure: Angioplasty-coronary;  Surgeon: Cl Rodriguez MD;  Location: [facility] CATH LAB;  Service: Cardiology;;    stent-heart      TONSILLECTOMY      TOTAL KNEE ARTHROPLASTY         Review of patient's allergies indicates:   Allergen Reactions    Effexor [venlafaxine] Hives, Shortness Of Breath and Other (See Comments)     Dry mouth and chest pain    Bactrim [sulfamethoxazole-trimethoprim]     Codeine Itching and Other (See Comments)     Tightness in throat       No  current facility-administered medications on file prior to encounter.     Current Outpatient Medications on File Prior to Encounter   Medication Sig    atorvastatin (LIPITOR) 80 MG tablet     colchicine (COLCRYS) 0.6 mg tablet TAKE 1 TABLET BY MOUTH EVERY DAY    isosorbide mononitrate (IMDUR) 60 MG 24 hr tablet Take 1 tablet (60 mg total) by mouth once daily.    metoprolol succinate (TOPROL-XL) 50 MG 24 hr tablet Take 1 tablet (50 mg total) by mouth once daily.    multivitamin (THERAGRAN) per tablet Take 1 tablet by mouth once daily.     Family History       Problem Relation (Age of Onset)    Heart disease Mother, Father          Tobacco Use    Smoking status: Never    Smokeless tobacco: Never   Substance and Sexual Activity    Alcohol use: No    Drug use: No    Sexual activity: Yes     Partners: Male     Review of Systems   Constitutional: Negative.   HENT: Negative.     Eyes: Negative.    Cardiovascular:  Positive for chest pain.   Respiratory: Negative.     Endocrine: Negative.    Hematologic/Lymphatic: Negative.    Skin: Negative.    Musculoskeletal: Negative.    Gastrointestinal: Negative.    Genitourinary: Negative.    Neurological: Negative.    Psychiatric/Behavioral: Negative.     Allergic/Immunologic: Negative.      Objective:     Vital Signs (Most Recent):  Temp: 98.1 °F (36.7 °C) (04/25/25 1526)  Pulse: 63 (04/25/25 1526)  Resp: 18 (04/25/25 1526)  BP: (!) 154/68 (04/25/25 1526)  SpO2: 98 % (04/25/25 1526) Vital Signs (24h Range):  Temp:  [97.6 °F (36.4 °C)-98.1 °F (36.7 °C)] 98.1 °F (36.7 °C)  Pulse:  [51-63] 63  Resp:  [17-18] 18  SpO2:  [97 %-99 %] 98 %  BP: (143-176)/(59-72) 154/68     Weight: 62.7 kg (138 lb 3.7 oz)  Body mass index is 27 kg/m².    SpO2: 98 %         Intake/Output Summary (Last 24 hours) at 4/26/2025 0027  Last data filed at 4/25/2025 0945  Gross per 24 hour   Intake 120 ml   Output --   Net 120 ml       Lines/Drains/Airways       None                    Physical  Exam  Constitutional:       Appearance: Normal appearance. She is well-developed.   HENT:      Head: Normocephalic.   Eyes:      Pupils: Pupils are equal, round, and reactive to light.   Cardiovascular:      Rate and Rhythm: Normal rate and regular rhythm.   Pulmonary:      Effort: Pulmonary effort is normal.      Breath sounds: Normal breath sounds.   Abdominal:      General: Bowel sounds are normal.      Palpations: Abdomen is soft.      Tenderness: There is no abdominal tenderness.   Musculoskeletal:         General: Normal range of motion.      Cervical back: Normal range of motion and neck supple.   Skin:     General: Skin is warm.   Neurological:      Mental Status: She is alert and oriented to person, place, and time.          Significant Labs:     DATA:     Laboratory:  CBC:  Recent Labs   Lab 07/04/24  1024 04/24/25  1552 04/25/25  0421   WBC 4.96 5.60 5.23   Hemoglobin 13.3  --   --    HGB  --  12.6 12.7   Hematocrit 37.3  --   --    HCT  --  38.6 39.4   Platelet Count  --  229 209   Platelets 205  --   --        CHEMISTRIES:  Recent Labs   Lab 07/04/24  1024 09/26/24  1140 01/16/25  0820 04/24/25 1552 04/25/25  0421   Glucose 105 103 H 106 H  --   --    Sodium 138 139 141 141 141   Potassium 3.7 4.0 4.2 4.1 4.0   BUN 22  --   --  21 20   Blood Urea Nitrogen  --  18 19  --   --    Creatinine 0.7 0.73 0.64 0.7 0.7   Calcium 9.2 9.8 9.5 9.2 8.7   Magnesium   --   --   --   --  2.0       CARDIAC BIOMARKERS:  Recent Labs   Lab 04/24/25  1552 04/24/25 2000 04/24/25  2336   Troponin-I <0.006 <0.006 <0.006       COAGS:  Recent Labs   Lab 03/26/24  1912 04/03/24 2031 07/04/24  1024   INR 1.0 1.0 1.1       LIPIDS/LFTS:  Recent Labs   Lab 01/16/25  0820 04/24/25  1552 04/25/25  0421   Cholesterol Total  --   --  128   Triglyceride  --   --  87   HDL Cholesterol  --   --  52   Non HDL Cholesterol  --   --  76   AST 22 22 19   ALT 22 21 21       Hemoglobin A1C   Date Value Ref Range Status   12/11/2007 5.5 4.5 -  6.2 % Final     Hemoglobin A1c   Date Value Ref Range Status   03/25/2021 4.9 <5.7 % of total Hgb Final     Comment:     For the purpose of screening for the presence of  diabetes:    <5.7%       Consistent with the absence of diabetes  5.7-6.4%    Consistent with increased risk for diabetes              (prediabetes)  > or =6.5%  Consistent with diabetes    This assay result is consistent with a decreased risk  of diabetes.    Currently, no consensus exists regarding use of  hemoglobin A1c for diagnosis of diabetes in children.    According to American Diabetes Association (ADA)  guidelines, hemoglobin A1c <7.0% represents optimal  control in non-pregnant diabetic patients. Different  metrics may apply to specific patient populations.   Standards of Medical Care in Diabetes(ADA).         % HEMOGLOBIN A1C   Date Value Ref Range Status   11/08/2022 4.9 <5.7 % of total Hgb Final     Comment:     For the purpose of screening for the presence of  diabetes:    <5.7%       Consistent with the absence of diabetes  5.7-6.4%    Consistent with increased risk for diabetes              (prediabetes)  > or =6.5%  Consistent with diabetes    This assay result is consistent with a decreased risk  of diabetes.    Currently, no consensus exists regarding use of  hemoglobin A1c for diagnosis of diabetes in children.    According to American Diabetes Association (ADA)  guidelines, hemoglobin A1c <7.0% represents optimal  control in non-pregnant diabetic patients. Different  metrics may apply to specific patient populations.   Standards of Medical Care in Diabetes(ADA).           TSH  Recent Labs   Lab 04/24/25  1552   TSH 2.333       The ASCVD Risk score (Zack VALENCIA, et al., 2019) failed to calculate for the following reasons:    The 2019 ASCVD risk score is only valid for ages 40 to 79    Risk score cannot be calculated because patient has a medical history suggesting prior/existing ASCVD       BNP    Lab Results   Component Value  Date/Time    BNP 61 04/24/2025 03:52 PM    BNP 43 04/03/2024 08:31 PM     (H) 03/25/2024 11:09 AM    BNP 85 04/29/2015 03:31 PM            ECHO    Results for orders placed during the hospital encounter of 04/24/25    Echo    Interpretation Summary    Left Ventricle: The left ventricle is normal in size. Mildly increased wall thickness. There is concentric hypertrophy. There is normal systolic function. Grade I diastolic dysfunction.    Right Ventricle: The right ventricle is normal in size. Systolic function is normal.    Left Atrium: Mildly dilated    Aortic Valve: There is mild aortic regurgitation.    Mitral Valve: There is mild regurgitation.    Pulmonary Artery: The estimated pulmonary artery systolic pressure is 22 mmHg.    IVC/SVC: Normal venous pressure at 3 mmHg.      STRESS TEST    Results for orders placed during the hospital encounter of 04/24/25    Nuclear Stress - Cardiology Interpreted    Interpretation Summary    Normal myocardial perfusion scan. There is no evidence of myocardial ischemia or infarction.    There is a mild intensity perfusion abnormality in the anteroapical wall of the left ventricle, secondary to breast attenuation.    The gated perfusion images showed an ejection fraction of 70% at rest.    There is normal wall motion at rest and post-stress.    The ECG portion of the study is negative for ischemia.    The patient reported no chest pain during the stress test.    There were no arrhythmias during stress.        CATH    Results for orders placed during the hospital encounter of 04/03/24    Cardiac catheterization    Conclusion  Description of the findings of the procedure: uneventful LHC/cor angio/IVUS guided PCI mid RCA AST (POBA)/R rad vasband.    Findings/Key Components:  LVEDP: 5mmHg  LVEF: echo ordered, prev normal LV fxn    Dominance: Right  LM: normal  LAD: mid diffuse 90% stenosis, small caliber distal vessel  LCx: patent prox-mid stents (placed 3/27/24)  RCA:  prox-mid stents thrombotic occlusion (placed 3/25/24), faint antonio to RPDA from LAD.    PCI prox-mid RCA AST:  Preop ASA/Plavix, intra-op heparin  Predil 2.5x20 POBA  IVUS for assessment of stents.  ?underexpansion of distal stent edge.  POBA 2.75x30 NC 20 domingo  Post IVUS with excellent stent expansion and apposition, no dissections  Excellent angiographic result, T3 flow, 0% residual stenosis    Hemostasis:  R Radial band

## 2025-04-26 NOTE — ASSESSMENT & PLAN NOTE
Discussed various options including repeat angiography versus stress testing with the patient.  Patient chose stress testing and would like to avoid angiography is possible.  Stress test did not show any significant ischemia.  Maximize medical management.  Follow-up in Cardiology Clinic.

## 2025-04-28 RX ORDER — METOPROLOL SUCCINATE 50 MG/1
50 TABLET, EXTENDED RELEASE ORAL DAILY
Qty: 30 TABLET | Refills: 11 | Status: SHIPPED | OUTPATIENT
Start: 2025-04-28 | End: 2026-04-28

## 2025-04-28 NOTE — TELEPHONE ENCOUNTER
----- Message from Med Assistant Jin sent at 4/28/2025  8:41 AM CDT -----  Type: RX Refill RequestWho Called:Pt Have you contacted your pharmacy:RefillRX Name and Strength:metoprolol succinate (TOPROL-XL) 50 MG 24 hr tabletPreferred Pharmacy with phone number:[facility] #[id number] - [place], [place] - [id number] [id number]: [phone] Fax: [phone] Local or Mail Order:Local Would the patient rather a call back or a response via My [facility]?Callback Best Call Back Number:Telephone Information:Mobile          [phone] Additional Information:Thank you.

## 2025-05-15 ENCOUNTER — OFFICE VISIT (OUTPATIENT)
Dept: CARDIOLOGY | Facility: CLINIC | Age: 80
End: 2025-05-15
Payer: MEDICARE

## 2025-05-15 VITALS
RESPIRATION RATE: 15 BRPM | SYSTOLIC BLOOD PRESSURE: 136 MMHG | BODY MASS INDEX: 26.7 KG/M2 | HEIGHT: 60 IN | WEIGHT: 136 LBS | OXYGEN SATURATION: 97 % | HEART RATE: 75 BPM | DIASTOLIC BLOOD PRESSURE: 72 MMHG

## 2025-05-15 DIAGNOSIS — I10 ESSENTIAL HYPERTENSION, BENIGN: ICD-10-CM

## 2025-05-15 DIAGNOSIS — I25.10 CORONARY ARTERY DISEASE, UNSPECIFIED VESSEL OR LESION TYPE, UNSPECIFIED WHETHER ANGINA PRESENT, UNSPECIFIED WHETHER NATIVE OR TRANSPLANTED HEART: ICD-10-CM

## 2025-05-15 DIAGNOSIS — I25.10 CORONARY ARTERY DISEASE INVOLVING NATIVE CORONARY ARTERY OF NATIVE HEART, UNSPECIFIED WHETHER ANGINA PRESENT: Primary | ICD-10-CM

## 2025-05-15 DIAGNOSIS — R07.9 CHEST PAIN, UNSPECIFIED TYPE: ICD-10-CM

## 2025-05-15 DIAGNOSIS — R94.31 ABNORMAL EKG: ICD-10-CM

## 2025-05-15 DIAGNOSIS — I70.0 AORTIC ATHEROSCLEROSIS: ICD-10-CM

## 2025-05-15 LAB
OHS QRS DURATION: 86 MS
OHS QTC CALCULATION: 435 MS

## 2025-05-15 PROCEDURE — 3075F SYST BP GE 130 - 139MM HG: CPT | Mod: CPTII,S$GLB,, | Performed by: INTERNAL MEDICINE

## 2025-05-15 PROCEDURE — 3288F FALL RISK ASSESSMENT DOCD: CPT | Mod: CPTII,S$GLB,, | Performed by: INTERNAL MEDICINE

## 2025-05-15 PROCEDURE — 1101F PT FALLS ASSESS-DOCD LE1/YR: CPT | Mod: CPTII,S$GLB,, | Performed by: INTERNAL MEDICINE

## 2025-05-15 PROCEDURE — 3078F DIAST BP <80 MM HG: CPT | Mod: CPTII,S$GLB,, | Performed by: INTERNAL MEDICINE

## 2025-05-15 PROCEDURE — G2211 COMPLEX E/M VISIT ADD ON: HCPCS | Mod: S$GLB,,, | Performed by: INTERNAL MEDICINE

## 2025-05-15 PROCEDURE — 99999 PR PBB SHADOW E&M-EST. PATIENT-LVL III: CPT | Mod: PBBFAC,,, | Performed by: INTERNAL MEDICINE

## 2025-05-15 PROCEDURE — 99214 OFFICE O/P EST MOD 30 MIN: CPT | Mod: S$GLB,,, | Performed by: INTERNAL MEDICINE

## 2025-05-15 PROCEDURE — 93000 ELECTROCARDIOGRAM COMPLETE: CPT | Mod: S$GLB,,, | Performed by: INTERNAL MEDICINE

## 2025-05-15 PROCEDURE — 1126F AMNT PAIN NOTED NONE PRSNT: CPT | Mod: CPTII,S$GLB,, | Performed by: INTERNAL MEDICINE

## 2025-05-15 RX ORDER — ISOSORBIDE MONONITRATE 120 MG/1
120 TABLET, EXTENDED RELEASE ORAL DAILY
Qty: 90 TABLET | Refills: 3 | Status: SHIPPED | OUTPATIENT
Start: 2025-05-15 | End: 2025-05-15

## 2025-05-15 RX ORDER — LOSARTAN POTASSIUM 25 MG/1
25 TABLET ORAL DAILY
COMMUNITY

## 2025-05-15 RX ORDER — ISOSORBIDE MONONITRATE 120 MG/1
120 TABLET, EXTENDED RELEASE ORAL DAILY
Qty: 90 TABLET | Refills: 3 | Status: SHIPPED | OUTPATIENT
Start: 2025-05-15

## 2025-05-15 NOTE — PROGRESS NOTES
CARDIOVASCULAR CONSULTATION    REASON FOR CONSULT:   Elizabeth Ayala is a 79 y.o. female who presents for   Chief Complaint   Patient presents with    Follow-up          HISTORY OF PRESENT ILLNESS:     Patient is a pleasant 78-year-old lady.  Came in with inferior STEMI.  Underwent PCI of RCA and subsequently PCI of circumflex.  Was not compliant with the dual antiplatelet therapy and did not take her aspirin and presented a week later with inferior STEMI for which she was taken to the cath lab which demonstrated that her RCA stent had thrombosed off.  She underwent revascularization of the RCA again.  This was done via the right radial approach by Dr. Marcum.  Patient states that after that the procedure, she felt some weakness in her right hand and difficulty doing fine movements.  She also had some balance issues which have persisted.  The hand weakness is getting better, although the coordination is not.  Otherwise denies any anginal sounding chest pains, orthopnea, PND    Results for orders placed or performed during the hospital encounter of 04/24/25   EKG 12-lead    Collection Time: 04/24/25  3:47 PM   Result Value Ref Range    QRS Duration 84 ms    OHS QTC Calculation 386 ms    Narrative    Test Reason : R07.9,    Vent. Rate :  58 BPM     Atrial Rate :  58 BPM     P-R Int : 218 ms          QRS Dur :  84 ms      QT Int : 394 ms       P-R-T Axes :  71  12  80 degrees    QTcB Int : 386 ms    Sinus bradycardia with 1st degree A-V block  Anterior infarct (cited on or before 25-Mar-2024)  Abnormal ECG  When compared with ECG of 04-Feb-2025 10:47,  No significant change was found  Confirmed by Vitaliy Brown ([id number]) on 4/25/2025 4:52:56 PM    Referred By: AAAREFERRAL SELF           Confirmed By: Vitaliy Brown          ECHO    Results for orders placed during the hospital encounter of 04/03/24    Echo    Interpretation Summary    Left Ventricle: Regional wall motion abnormalities present (mild basal inferior  hypokinesis). There is normal systolic function with a visually estimated ejection fraction of 55 - 60%. Grade I diastolic dysfunction.    Right Ventricle: Normal right ventricular cavity size. Systolic function is normal.    Aortic Valve: The aortic valve is a trileaflet valve. There is mild aortic valve sclerosis. There is mild aortic regurgitation.    Pulmonary Artery: The estimated pulmonary artery systolic pressure is 14 mmHg.            CATH    Results for orders placed during the hospital encounter of 04/03/24    Cardiac catheterization    Conclusion  Description of the findings of the procedure: uneventful LHC/cor angio/IVUS guided PCI mid RCA AST (POBA)/R rad vasband.    Findings/Key Components:  LVEDP: 5mmHg  LVEF: echo ordered, prev normal LV fxn    Dominance: Right  LM: normal  LAD: mid diffuse 90% stenosis, small caliber distal vessel  LCx: patent prox-mid stents (placed 3/27/24)  RCA: prox-mid stents thrombotic occlusion (placed 3/25/24), faint antonio to RPDA from LAD.    PCI prox-mid RCA AST:  Preop ASA/Plavix, intra-op heparin  Predil 2.5x20 POBA  IVUS for assessment of stents.  ?underexpansion of distal stent edge.  POBA 2.75x30 NC 20 domingo  Post IVUS with excellent stent expansion and apposition, no dissections  Excellent angiographic result, T3 flow, 0% residual stenosis    Hemostasis:  R Radial band        Successful IVUS guided PCI of prox to mid circ with COLTON x2 with excellent angiographic results.  IVUS post PCI revealed well-opposed and expanded stents.    The Prox Cx lesion was 95% stenosed with 0% stenosis post-intervention.    The Mid Cx lesion was 80% stenosed with 0% stenosis post-intervention.    Prox Cx lesion: A STENT SYNERGY XD [id number] stent was successfully placed at 12 DOMINGO for 15 sec.    Mid Cx lesion: A stent was successfully placed at 12 DOMINGO for 9 sec.    The estimated blood loss was <50 mL.     Coronary angiogram      Left main:  No significant stenosis     Lad:  Mid to distal long  80% stenosis.  Considering the length of this lesion and the diffusely diseased LAD, consider medical management and PCI only if the patient continues to have lifestyle limiting angina despite maximum medical management      Circumflex:  Proximal 95% and mid 80% stenosis.  Successful PCI with COLTON x2      RCA: Previously placed RCA stents are widely patent.        Access:  Right common femoral artery access.  Remove sheath when ACT less than 160        Assessment and plan      Continue aspirin 81 mg daily indefinitely     Plavix 75 mg daily for at least 1 year and longer if no contraindication      Statins and aggressive risk factor modification      There was three vessel coronary artery disease.    The Prox RCA lesion was 99% stenosed with 0% stenosis post-intervention.  This was the culprit vessel for the patient's myocardial infarction    Prox RCA lesion: A STENT SYNERGY XD [id number] stent was successfully placed at 16 ROCÍO for 15 sec.    The estimated blood loss was <50 mL.     Left main:  No significant stenosis     Lad: Mid to distal small diffusely diseased vessel     Circumflex:  Proximal 95%, mid 80% stenosis      RCA:  Culprit vessel:  Proximal heavily calcified subtotal stenosis proximal to previously placed stent.  Successful PCI performed with restoration of DAVID 3 flow and excellent angiographic results.  Resolution of chest pain at end of procedure          Notes from July 24: Patient here for follow-up.  Occasional chest tightness on exertion.  States that the chest tightness did not get better after last PCI of the RCA.  Patient is feels her right hand is slightly weaker than the left hand.  Under the care of Neurology for that.  Continue medical management with aspirin and Plavix    Notes from August 24: Patient here for follow-up.  Doing fine.  Denies chest pains at rest on exertion, orthopnea, PND    Results for orders placed during the hospital encounter of 08/19/24    Nuclear Stress - Cardiology  Interpreted    Interpretation Summary    Normal myocardial perfusion scan. There is no evidence of myocardial ischemia or infarction.    The gated perfusion images showed an ejection fraction of 67% at rest.    There is normal wall motion at rest and post-stress.    The ECG portion of the study is negative for ischemia.    The patient reported chest pain during the stress test.    During stress, occasional PVCs are noted.    February 25    History of Present Illness    CHIEF COMPLAINT:  Elizabeth presents today for cardiac follow-up.    CARDIOVASCULAR HISTORY:  She has a history of stent placement in the right coronary artery (RCA) and circumflex artery. A stress test from August last year was normal, showing no evidence of blockages. Her blood pressure is well-controlled at home, though slightly elevated in clinic settings. She denies chest pain, tightness, shortness of breath, palpitations, or sensation of heart beating fast or slow. She denies any swelling of feet and has not required recent use of nitroglycerin.    WEIGHT:  She reports unintentional weight loss of over 50 lbs. She maintains her usual food choices but consumes smaller portions, noting decreased appetite.    CURRENT MEDICATIONS:  She continues amlodipine, aspirin, atenolol, atorvastatin, plavix, Imdur, metoprolol, and nitroglycerin as needed.         May 25    History of Present Illness    CHIEF COMPLAINT:  Elizabeth presents today for cardiac follow up.    CARDIAC SYMPTOMS:  She experiences intermittent chest heaviness similar to when she was hospitalized, but notes it differs from her previous cardiac symptoms. She believes the discomfort may be stress-related rather than due to cardiac stenosis. Recent stress test was normal, showing no evidence of cardiac stenosis.    CURRENT MEDICATIONS:  Her cardiac medications include ASA, atorvastatin, clopidogrel, isosorbide mononitrate, and metoprolol. She was recently restarted on losartan 25 mg by her primary care  physician.       Results for orders placed or performed during the hospital encounter of 04/24/25   EKG 12-lead    Collection Time: 04/24/25  3:47 PM   Result Value Ref Range    QRS Duration 84 ms    OHS QTC Calculation 386 ms    Narrative    Test Reason : R07.9,    Vent. Rate :  58 BPM     Atrial Rate :  58 BPM     P-R Int : 218 ms          QRS Dur :  84 ms      QT Int : 394 ms       P-R-T Axes :  71  12  80 degrees    QTcB Int : 386 ms    Sinus bradycardia with 1st degree A-V block  Anterior infarct (cited on or before 25-Mar-2024)  Abnormal ECG  When compared with ECG of 04-Feb-2025 10:47,  No significant change was found  Confirmed by Vitaliy Brown ([id number]) on 4/25/2025 4:52:56 PM    Referred By: AAAREFERRAL SELF           Confirmed By: Vitaliy Brown       Results for orders placed during the hospital encounter of 04/24/25    Echo    Interpretation Summary    Left Ventricle: The left ventricle is normal in size. Mildly increased wall thickness. There is concentric hypertrophy. There is normal systolic function. Grade I diastolic dysfunction.    Right Ventricle: The right ventricle is normal in size. Systolic function is normal.    Left Atrium: Mildly dilated    Aortic Valve: There is mild aortic regurgitation.    Mitral Valve: There is mild regurgitation.    Pulmonary Artery: The estimated pulmonary artery systolic pressure is 22 mmHg.    IVC/SVC: Normal venous pressure at 3 mmHg.      Results for orders placed during the hospital encounter of 04/24/25    Nuclear Stress - Cardiology Interpreted    Interpretation Summary    Normal myocardial perfusion scan. There is no evidence of myocardial ischemia or infarction.    There is a mild intensity perfusion abnormality in the anteroapical wall of the left ventricle, secondary to breast attenuation.    The gated perfusion images showed an ejection fraction of 70% at rest.    There is normal wall motion at rest and post-stress.    The ECG portion of the study is negative for  ischemia.    The patient reported no chest pain during the stress test.    There were no arrhythmias during stress.        PAST MEDICAL HISTORY:     Past Medical History:   Diagnosis Date    AC (acromioclavicular) joint bone spurs, unspecified laterality     Coronary artery disease     Depression     Hyperlipidemia     Hypertension     Osteoarthritis     ST elevation (STEMI) myocardial infarction of unspecified site     Vaginal delivery     x2       PAST SURGICAL HISTORY:     Past Surgical History:   Procedure Laterality Date    ADENOIDECTOMY      ANGIOGRAM, CORONARY, WITH LEFT HEART CATHETERIZATION N/A 4/3/2024    Procedure: Angiogram, Coronary, with Left Heart Cath;  Surgeon: Cl Rodriguez MD;  Location: [facility] CATH LAB;  Service: Cardiology;  Laterality: N/A;    BREAST SURGERY      biospy    CORONARY STENT PLACEMENT N/A 3/27/2024    Procedure: INSERTION, STENT, CORONARY ARTERY;  Surgeon: Vitaliy Brown MD;  Location: [facility] CATH LAB;  Service: Cardiology;  Laterality: N/A;  PCI circumflex, right common femoral artery access    HYSTERECTOMY      IVUS, CORONARY  3/27/2024    Procedure: IVUS, Coronary;  Surgeon: Vitaliy Brown MD;  Location: [facility] CATH LAB;  Service: Cardiology;;    IVUS, CORONARY  4/3/2024    Procedure: IVUS, Coronary;  Surgeon: Cl Rodriguez MD;  Location: [facility] CATH LAB;  Service: Cardiology;;    JOINT REPLACEMENT Bilateral     knee    LEFT HEART CATHETERIZATION Left 3/25/2024    Procedure: Left heart cath;  Surgeon: Vitaliy Brown MD;  Location: [facility] CATH LAB;  Service: Cardiology;  Laterality: Left;    PERCUTANEOUS TRANSLUMINAL BALLOON ANGIOPLASTY OF CORONARY ARTERY  4/3/2024    Procedure: Angioplasty-coronary;  Surgeon: Cl Rodriguez MD;  Location: [facility] CATH LAB;  Service: Cardiology;;    stent-heart      TONSILLECTOMY      TOTAL KNEE ARTHROPLASTY             SOCIAL HISTORY:     Social History     Socioeconomic History    Marital status:    Tobacco Use    Smoking  status: Never    Smokeless tobacco: Never   Substance and Sexual Activity    Alcohol use: No    Drug use: No    Sexual activity: Yes     Partners: Male     Social Drivers of Health     Financial Resource Strain: Low Risk  (4/24/2025)    Overall Financial Resource Strain (CARDIA)     Difficulty of Paying Living Expenses: Not hard at all   Food Insecurity: No Food Insecurity (4/24/2025)    Hunger Vital Sign     Worried About Running Out of Food in the Last Year: Never true     Ran Out of Food in the Last Year: Never true   Transportation Needs: No Transportation Needs (4/24/2025)    PRAPARE - Transportation     Lack of Transportation (Medical): No     Lack of Transportation (Non-Medical): No   Physical Activity: Insufficiently Active (7/25/2024)    Exercise Vital Sign     Days of Exercise per Week: 1 day     Minutes of Exercise per Session: 10 min   Stress: No Stress Concern Present (4/24/2025)    [employer] [place] [employer] - Occupational Stress Questionnaire     Feeling of Stress : Not at all   Housing Stability: Low Risk  (4/24/2025)    Housing Stability Vital Sign     Unable to Pay for Housing in the Last Year: No     Homeless in the Last Year: No       FAMILY HISTORY:     Family History   Problem Relation Name Age of Onset    Heart disease Mother      Heart disease Father         REVIEW OF SYSTEMS:   Review of Systems   Constitutional: Negative.   HENT: Negative.     Eyes: Negative.    Endocrine: Negative.    Hematologic/Lymphatic: Negative.    Skin: Negative.    Musculoskeletal: Negative.    Gastrointestinal: Negative.    Genitourinary: Negative.    Psychiatric/Behavioral: Negative.     Allergic/Immunologic: Negative.        A 10 point review of systems was performed and all the pertinent positives have been mentioned. Rest of review of systems was negative.        PHYSICAL EXAM:     Vitals:    05/15/25 1319   BP: 136/72   Pulse: 75   Resp: 15    Body mass index is 26.57 kg/m².  Weight: 61.7 kg (136  lb 0.4 oz)   Height: 5' (152.4 cm)     Physical Exam  Constitutional:       Appearance: Normal appearance. She is well-developed.   HENT:      Head: Normocephalic.   Eyes:      Pupils: Pupils are equal, round, and reactive to light.   Cardiovascular:      Rate and Rhythm: Normal rate and regular rhythm.   Pulmonary:      Effort: Pulmonary effort is normal.      Breath sounds: Normal breath sounds.   Abdominal:      General: Bowel sounds are normal.      Palpations: Abdomen is soft.      Tenderness: There is no abdominal tenderness.   Musculoskeletal:         General: Normal range of motion.      Cervical back: Normal range of motion and neck supple.   Skin:     General: Skin is warm.   Neurological:      Mental Status: She is alert and oriented to person, place, and time.           DATA:     Laboratory:  CBC:  Recent Labs   Lab 07/04/24  1024 04/24/25  1552 04/25/25  0421   WBC 4.96 5.60 5.23   Hemoglobin 13.3  --   --    HGB  --  12.6 12.7   Hematocrit 37.3  --   --    HCT  --  38.6 39.4   Platelet Count  --  229 209   Platelets 205  --   --        CHEMISTRIES:  Recent Labs   Lab 01/16/25  0820 04/24/25  1552 04/25/25  0421   Glucose 106 H 119 H 91   Sodium 141 141 141   Potassium 4.2 4.1 4.0   BUN  --  21 20   Blood Urea Nitrogen 19  --   --    Creatinine 0.64 0.7 0.7   Calcium 9.5 9.2 8.7   Magnesium   --   --  2.0       CARDIAC BIOMARKERS:  Recent Labs   Lab 04/24/25  1552 04/24/25 2000 04/24/25  2336   Troponin-I <0.006 <0.006 <0.006       COAGS:  Recent Labs   Lab 03/26/24  1912 04/03/24 2031 07/04/24  1024   INR 1.0 1.0 1.1       LIPIDS/LFTS:  Recent Labs   Lab 01/16/25  0820 04/24/25  1552 04/25/25  0421   Cholesterol Total  --   --  128   Triglyceride  --   --  87   HDL Cholesterol  --   --  52   LDL Cholesterol  --   --  58.6 L   Non HDL Cholesterol  --   --  76   AST 22 22 19   ALT 22 21 21       Hemoglobin A1C   Date Value Ref Range Status   12/11/2007 5.5 4.5 - 6.2 % Final     Hemoglobin A1c   Date  Value Ref Range Status   03/25/2021 4.9 <5.7 % of total Hgb Final     Comment:     For the purpose of screening for the presence of  diabetes:    <5.7%       Consistent with the absence of diabetes  5.7-6.4%    Consistent with increased risk for diabetes              (prediabetes)  > or =6.5%  Consistent with diabetes    This assay result is consistent with a decreased risk  of diabetes.    Currently, no consensus exists regarding use of  hemoglobin A1c for diagnosis of diabetes in children.    According to American Diabetes Association (ADA)  guidelines, hemoglobin A1c <7.0% represents optimal  control in non-pregnant diabetic patients. Different  metrics may apply to specific patient populations.   Standards of Medical Care in Diabetes(ADA).         % HEMOGLOBIN A1C   Date Value Ref Range Status   11/08/2022 4.9 <5.7 % of total Hgb Final     Comment:     For the purpose of screening for the presence of  diabetes:    <5.7%       Consistent with the absence of diabetes  5.7-6.4%    Consistent with increased risk for diabetes              (prediabetes)  > or =6.5%  Consistent with diabetes    This assay result is consistent with a decreased risk  of diabetes.    Currently, no consensus exists regarding use of  hemoglobin A1c for diagnosis of diabetes in children.    According to American Diabetes Association (ADA)  guidelines, hemoglobin A1c <7.0% represents optimal  control in non-pregnant diabetic patients. Different  metrics may apply to specific patient populations.   Standards of Medical Care in Diabetes(ADA).           TSH  Recent Labs   Lab 04/24/25  1552   TSH 2.333       The ASCVD Risk score (Zack VALENCIA, et al., 2019) failed to calculate for the following reasons:    The 2019 ASCVD risk score is only valid for ages 40 to 79    Risk score cannot be calculated because patient has a medical history suggesting prior/existing ASCVD       BNP    Lab Results   Component Value Date/Time    BNP 61 04/24/2025 03:52 PM     BNP 43 04/03/2024 08:31 PM     (H) 03/25/2024 11:09 AM    BNP 85 04/29/2015 03:31 PM     Lab Results   Component Value Date    LDLCALC 58.6 (L) 04/25/2025        ASSESSMENT AND PLAN     Patient Active Problem List   Diagnosis    Depression    Essential hypertension, benign    Coronary artery disease involving native coronary artery of native heart with unstable angina pectoris    Chest pain    Hypertension    Mixed hyperlipidemia    Dressler's syndrome    Normocytic anemia    Chronic diastolic heart failure         ALLERGIES AND MEDICATION:     Review of patient's allergies indicates:   Allergen Reactions    Effexor [venlafaxine] Hives, Shortness Of Breath and Other (See Comments)     Dry mouth and chest pain    Bactrim [sulfamethoxazole-trimethoprim]     Codeine Itching and Other (See Comments)     Tightness in throat        Medication List            Accurate as of May 15, 2025  2:15 PM. If you have any questions, ask your nurse or doctor.                CHANGE how you take these medications      isosorbide mononitrate 120 MG 24 hr tablet  Commonly known as: IMDUR  Take 1 tablet (120 mg total) by mouth once daily.  What changed:   medication strength  how much to take  Changed by: Vitaliy Brown MD            CONTINUE taking these medications      aspirin 81 MG EC tablet  Commonly known as: ECOTRIN  Take 1 tablet (81 mg total) by mouth once daily.     atorvastatin 80 MG tablet  Commonly known as: LIPITOR     clopidogreL 75 mg tablet  Commonly known as: PLAVIX  Take 1 tablet (75 mg total) by mouth once daily.     colchicine 0.6 mg tablet  Commonly known as: COLCRYS  TAKE 1 TABLET BY MOUTH EVERY DAY     losartan 25 MG tablet  Commonly known as: COZAAR     metoprolol succinate 50 MG 24 hr tablet  Commonly known as: TOPROL-XL  Take 1 tablet (50 mg total) by mouth once daily.     multivitamin per tablet  Commonly known as: THERAGRAN     traZODone 50 MG tablet  Commonly known as: DESYREL  Take 1 tablet (50 mg  total) by mouth every evening.               Where to Get Your Medications        Information about where to get these medications is not yet available    Ask your nurse or doctor about these medications  isosorbide mononitrate 120 MG 24 hr tablet         Orders Placed This Encounter   Procedures    IN OFFICE EKG 12-LEAD (to Muse)     Assessment & Plan    IMPRESSION:  Cardiac s/p stent placement in RCA and circumflex is stable  Recent stress test results were normal with no ischemia noted  Home blood pressure readings are well-controlled  Increased Imdur to 120 mg daily    PLAN SUMMARY:  - Continue Metropolol at current dose  - Continue amlodipine and atenolol at current doses  - Continue Nitroglycerin as needed  - Continue atorvastatin at current dose  - Continue aspirin and flavix at current doses  - EKG performed  - Follow up in 6 months  - Contact office if symptoms occur before next visit    EKG personally reviewed:  - Continued Metropolol at current dose.    HYPERTENSION:  - Continued amlodipine and atenolol at current doses.    CORONARY ARTERY DISEASE:  - status post PCI of RCA and circumflex.  Angina free  - Continued aspirin and flavix at current doses.  - Continued Nitroglycerin as needed.  - Continued Imdur at current dose.    HYPERLIPIDEMIA:  - Continued atorvastatin at current dose.    FOLLOW-UP:  - Follow up in 3 months.  - Contact the office if any symptoms occur before the next visit.         This note was generated with the assistance of ambient listening technology. Verbal consent was obtained by the patient and accompanying visitor(s) for the recording of patient appointment to facilitate this note. I attest to having reviewed and edited the generated note for accuracy, though some syntax or spelling errors may persist. Please contact the author of this note for any clarification.          Thank you very much for involving me in the care of your patient.  Please do not hesitate to contact me if  there are any questions.      Vitaliy Brown MD, FACC, [facility]  Interventional Cardiologist, [facility].     Visit today included increased complexity associated with the care of the episodic problem coronary artery disease status post PCI, dyslipidemia and hypertension addressed and managing the longitudinal care of the patient due to the serious and/or complex managed problem(s) coronary artery disease status post PCI, dyslipidemia, hypertension.      This note was dictated with the help of speech recognition software.  There might be un-intended errors and/or substitutions.

## 2025-05-30 ENCOUNTER — TELEPHONE (OUTPATIENT)
Dept: CARDIOLOGY | Facility: CLINIC | Age: 80
End: 2025-05-30
Payer: MEDICARE

## 2025-05-30 RX ORDER — AMLODIPINE BESYLATE 5 MG/1
5 TABLET ORAL
Qty: 90 TABLET | Refills: 3 | Status: SHIPPED | OUTPATIENT
Start: 2025-05-30 | End: 2025-05-30 | Stop reason: CLARIF

## 2025-05-30 RX ORDER — CLOPIDOGREL BISULFATE 75 MG/1
75 TABLET ORAL
Qty: 90 TABLET | Refills: 3 | Status: SHIPPED | OUTPATIENT
Start: 2025-05-30

## 2025-05-30 NOTE — TELEPHONE ENCOUNTER
----- Message from Med Assistant Choudhury sent at 5/30/2025  9:34 AM CDT -----  Regarding: Refill Request  Who Called:TONY ORTIZ [[id number]] New Prescription or Refill : RefillRX Name and Strength:  Amlodipine Besylate 5mg 30 day or 90 day RX: 90 Local or Mail Order : local  Preferred Pharmacy: [facility]/pharmacy #[id number] - [place], [place] - [address] Would the patient rather a call back or a response via [facility]? [facility] Call Back Number:  [phone]

## 2025-05-30 NOTE — TELEPHONE ENCOUNTER
----- Message from Med Assistant Choudhury sent at 5/30/2025  9:34 AM CDT -----  Regarding: Refill Request  Who Called:TONY ORTIZ [[id number]] New Prescription or Refill : RefillRX Name and Strength:  Amlodipine Besylate 5mg 30 day or 90 day RX: 90 Local or Mail Order : local  Preferred Pharmacy: [facility]/pharmacy #[id number] - [place], [place] - [address] Would the patient rather a call back or a response via [facility]? [facility] Call Back Number:  [phone]

## 2025-06-12 RX ORDER — ASPIRIN 81 MG/1
81 TABLET ORAL
Qty: 90 TABLET | Refills: 1 | Status: SHIPPED | OUTPATIENT
Start: 2025-06-12

## 2025-06-13 RX ORDER — ISOSORBIDE MONONITRATE 120 MG/1
120 TABLET, EXTENDED RELEASE ORAL DAILY
Qty: 90 TABLET | Refills: 3 | Status: SHIPPED | OUTPATIENT
Start: 2025-06-13

## 2025-06-25 ENCOUNTER — TELEPHONE (OUTPATIENT)
Dept: CARDIOLOGY | Facility: CLINIC | Age: 80
End: 2025-06-25
Payer: MEDICARE

## 2025-06-26 ENCOUNTER — TELEPHONE (OUTPATIENT)
Dept: CARDIOLOGY | Facility: CLINIC | Age: 80
End: 2025-06-26
Payer: MEDICARE

## 2025-07-10 ENCOUNTER — NURSE TRIAGE (OUTPATIENT)
Dept: ADMINISTRATIVE | Facility: CLINIC | Age: 80
End: 2025-07-10
Payer: MEDICARE

## 2025-07-10 NOTE — TELEPHONE ENCOUNTER
Pt stated she has been having a lot of weakness. Her bp is 114/48. Pressure in her chest. Angina. And hx of heart attack. Chest pressure 5/10. Was intermittent now constant. Chest pressure present for at least 30 min. Care advice recommends pt call 911. Pt stated she have to call her daughter.   Reason for Disposition   Chest pain lasting longer than 5 minutes and over 42 years old    Additional Information   Negative: SEVERE difficulty breathing (e.g., struggling for each breath, speaks in single words)   Negative: Difficult to awaken or acting confused (e.g., disoriented, slurred speech)   Negative: Shock suspected (e.g., cold/pale/clammy skin, too weak to stand, low BP, rapid pulse)   Negative: Passed out (e.g., fainted, lost consciousness, blacked out and was not responding)    Protocols used: Chest Pain-A-[place]

## 2025-08-15 ENCOUNTER — OFFICE VISIT (OUTPATIENT)
Dept: CARDIOLOGY | Facility: CLINIC | Age: 80
End: 2025-08-15
Payer: MEDICARE

## 2025-08-15 VITALS
SYSTOLIC BLOOD PRESSURE: 116 MMHG | WEIGHT: 138.56 LBS | HEIGHT: 60 IN | BODY MASS INDEX: 27.2 KG/M2 | RESPIRATION RATE: 15 BRPM | OXYGEN SATURATION: 99 % | DIASTOLIC BLOOD PRESSURE: 73 MMHG | HEART RATE: 61 BPM

## 2025-08-15 DIAGNOSIS — R07.9 CHEST PAIN, UNSPECIFIED TYPE: Primary | ICD-10-CM

## 2025-08-15 PROCEDURE — 3078F DIAST BP <80 MM HG: CPT | Mod: CPTII,S$GLB,, | Performed by: INTERNAL MEDICINE

## 2025-08-15 PROCEDURE — 1101F PT FALLS ASSESS-DOCD LE1/YR: CPT | Mod: CPTII,S$GLB,, | Performed by: INTERNAL MEDICINE

## 2025-08-15 PROCEDURE — 99214 OFFICE O/P EST MOD 30 MIN: CPT | Mod: S$GLB,,, | Performed by: INTERNAL MEDICINE

## 2025-08-15 PROCEDURE — 99999 PR PBB SHADOW E&M-EST. PATIENT-LVL IV: CPT | Mod: PBBFAC,,, | Performed by: INTERNAL MEDICINE

## 2025-08-15 PROCEDURE — G2211 COMPLEX E/M VISIT ADD ON: HCPCS | Mod: S$GLB,,, | Performed by: INTERNAL MEDICINE

## 2025-08-15 PROCEDURE — 1159F MED LIST DOCD IN RCRD: CPT | Mod: CPTII,S$GLB,, | Performed by: INTERNAL MEDICINE

## 2025-08-15 PROCEDURE — 3288F FALL RISK ASSESSMENT DOCD: CPT | Mod: CPTII,S$GLB,, | Performed by: INTERNAL MEDICINE

## 2025-08-15 PROCEDURE — 3074F SYST BP LT 130 MM HG: CPT | Mod: CPTII,S$GLB,, | Performed by: INTERNAL MEDICINE

## 2025-08-15 PROCEDURE — 1126F AMNT PAIN NOTED NONE PRSNT: CPT | Mod: CPTII,S$GLB,, | Performed by: INTERNAL MEDICINE

## 2025-08-15 RX ORDER — RANOLAZINE 1000 MG/1
1000 TABLET, EXTENDED RELEASE ORAL 2 TIMES DAILY
Qty: 180 TABLET | Refills: 3 | Status: SHIPPED | OUTPATIENT
Start: 2025-08-15

## 2025-08-15 RX ORDER — ISOSORBIDE MONONITRATE 60 MG/1
60 TABLET, EXTENDED RELEASE ORAL DAILY
Qty: 90 TABLET | Refills: 3 | Status: SHIPPED | OUTPATIENT
Start: 2025-08-15

## (undated) DEVICE — CATH EAGLE EYE PLATINUM

## (undated) DEVICE — CATH DXTERITY JR40 100CM 5FR

## (undated) DEVICE — CATH EMPULSE ANGLED 5FR PIGTAI

## (undated) DEVICE — WIRE GUIDE SAFE-T-J .035 260CM

## (undated) DEVICE — SUT 3-0 12-18IN SILK

## (undated) DEVICE — GUIDEWIRE STF .035X260CM ANG

## (undated) DEVICE — SUT 3-0 VICRYL / SH (J416)

## (undated) DEVICE — CATH NC EMERGE MR 2.75X12MM

## (undated) DEVICE — SEE L#120831

## (undated) DEVICE — SUT CTD VICRYL 3-0 UND SUTU

## (undated) DEVICE — HEMOSTAT VASC BAND REG 24CM

## (undated) DEVICE — GAUZE SPONGE 4X4 12PLY

## (undated) DEVICE — GUIDEWIRE PROWATER .014X180CM

## (undated) DEVICE — GLOVE BIOGEL 7.5

## (undated) DEVICE — SEE MEDLINE ITEM 146417

## (undated) DEVICE — PACK CATH LAB

## (undated) DEVICE — Device

## (undated) DEVICE — CATH NC EMERGE MR 3.25X15MM

## (undated) DEVICE — APPLICATOR CHLORAPREP ORN 26ML

## (undated) DEVICE — KIT MANIFOLD LOW PRESS TUBING

## (undated) DEVICE — WIRE WHOLEY 260CM

## (undated) DEVICE — NDL HYPO REG 25G X 1 1/2

## (undated) DEVICE — DEVICE PERCLOSE SUT CLSR 6FR

## (undated) DEVICE — CATH GUIDE LINER  V3 6F

## (undated) DEVICE — GUIDE LAUNCHER 5FR JR 4.0

## (undated) DEVICE — KIT INTRODUCER MICROPUNCTR 4F

## (undated) DEVICE — CATH DXTERITY JL35 100CM 5FR

## (undated) DEVICE — PRESTO INFLATION DEVICE

## (undated) DEVICE — PAD DEFIB CADENCE ADULT R2

## (undated) DEVICE — GUIDE LAUNCHER 6FR EBU 3.5

## (undated) DEVICE — GLOVE SURG BIOGEL LATEX SZ 7.5

## (undated) DEVICE — PACK DRAPE UNIVERSAL CONVERTOR

## (undated) DEVICE — GUIDEWIRE RUNTHROUGH EF 180CM

## (undated) DEVICE — SYR 10CC LUER LOCK

## (undated) DEVICE — SHEATH INTRO PINNACLE 6F 10CM

## (undated) DEVICE — ELECTRODE REM PLYHSV RETURN 9

## (undated) DEVICE — CATH DXTERITY JR40 100CM 6FR

## (undated) DEVICE — CATH JACKY RADIAL 3.5 110CM

## (undated) DEVICE — CATH INFINITI JUDKINS JR4

## (undated) DEVICE — KIT SYR REUSABLE

## (undated) DEVICE — GUIDEWIRE WHISPER ES .014X190

## (undated) DEVICE — CATH EMERGE MR 15 X 3.00

## (undated) DEVICE — ANGIOTOUCH KIT

## (undated) DEVICE — KIT ESSENTIALS W/ Y ADAPTER

## (undated) DEVICE — CATH NC EMERGE MR 3X15MM

## (undated) DEVICE — CATH EMERGE MR 15 X 2.50

## (undated) DEVICE — SUT SILK 2-0 PS 18IN BLACK

## (undated) DEVICE — VALVE CONTROL COPILOT

## (undated) DEVICE — CATH EMERGE MR 8 X 2.00

## (undated) DEVICE — BLADE PEAK PLASMA

## (undated) DEVICE — PAD RADI FEMORAL

## (undated) DEVICE — KIT GLIDESHEATH SLEND 6FR 10CM

## (undated) DEVICE — OMNIPAQUE CONTRAST 350MG/100ML

## (undated) DEVICE — CATH LAUNCHER JR4.0 6FR 90CM

## (undated) DEVICE — CATH NC EMERGE MR 2.75X30MM

## (undated) DEVICE — SEE MEDLINE ITEM 152622

## (undated) DEVICE — OMNIPAQUE 350MG 150ML VIAL

## (undated) DEVICE — CATH DXTERITY JL40 100CM 6FR

## (undated) DEVICE — CATH EMERGE MR 20 X 2.50

## (undated) DEVICE — CATH NC EMERGE MR 2.5X15MM

## (undated) DEVICE — CATH 4FR JL 3.5

## (undated) DEVICE — SUT 4/0 18IN COATED VICRYL

## (undated) DEVICE — SEE MEDLINE ITEM 157117